# Patient Record
Sex: MALE | Race: WHITE | NOT HISPANIC OR LATINO | Employment: FULL TIME | URBAN - METROPOLITAN AREA
[De-identification: names, ages, dates, MRNs, and addresses within clinical notes are randomized per-mention and may not be internally consistent; named-entity substitution may affect disease eponyms.]

---

## 2018-04-11 ENCOUNTER — TELEPHONE (OUTPATIENT)
Dept: PAIN MEDICINE | Facility: CLINIC | Age: 63
End: 2018-04-11

## 2018-04-11 NOTE — TELEPHONE ENCOUNTER
Pt called the [facility] office to schedule a consult w/Dr Diana Soto  Intake was started  Waiting for records from Dr Emily Means office  I called and s/w Suhail Paiz  She will fax the records to the back fax

## 2018-05-24 ENCOUNTER — TELEPHONE (OUTPATIENT)
Dept: RADIOLOGY | Facility: MEDICAL CENTER | Age: 63
End: 2018-05-24

## 2018-05-24 ENCOUNTER — OFFICE VISIT (OUTPATIENT)
Dept: PAIN MEDICINE | Facility: CLINIC | Age: 63
End: 2018-05-24
Payer: COMMERCIAL

## 2018-05-24 VITALS
BODY MASS INDEX: 32.2 KG/M2 | HEART RATE: 60 BPM | HEIGHT: 75 IN | WEIGHT: 259 LBS | SYSTOLIC BLOOD PRESSURE: 138 MMHG | TEMPERATURE: 98.3 F | DIASTOLIC BLOOD PRESSURE: 88 MMHG

## 2018-05-24 DIAGNOSIS — M54.2 NECK PAIN: Primary | ICD-10-CM

## 2018-05-24 DIAGNOSIS — M47.812 SPONDYLOSIS OF CERVICAL REGION WITHOUT MYELOPATHY OR RADICULOPATHY: ICD-10-CM

## 2018-05-24 PROCEDURE — 99244 OFF/OP CNSLTJ NEW/EST MOD 40: CPT | Performed by: ANESTHESIOLOGY

## 2018-05-24 RX ORDER — PAROXETINE HYDROCHLORIDE 20 MG/1
20 TABLET, FILM COATED ORAL DAILY
Refills: 0 | COMMUNITY
Start: 2018-03-24

## 2018-05-24 RX ORDER — COVID-19 ANTIGEN TEST
KIT MISCELLANEOUS
COMMUNITY
End: 2022-07-07

## 2018-05-24 RX ORDER — LOSARTAN POTASSIUM AND HYDROCHLOROTHIAZIDE 25; 100 MG/1; MG/1
1 TABLET ORAL DAILY
Refills: 0 | COMMUNITY
Start: 2018-03-24

## 2018-05-24 RX ORDER — CARVEDILOL 12.5 MG/1
12.5 TABLET ORAL 2 TIMES DAILY
Refills: 1 | COMMUNITY
Start: 2018-05-12 | End: 2022-07-07

## 2018-05-24 RX ORDER — AMLODIPINE BESYLATE 10 MG/1
10 TABLET ORAL DAILY
Refills: 1 | COMMUNITY
Start: 2018-03-20

## 2018-05-24 RX ORDER — ESOMEPRAZOLE MAGNESIUM 40 MG/1
40 CAPSULE, DELAYED RELEASE ORAL DAILY
Refills: 3 | COMMUNITY
Start: 2018-03-20 | End: 2019-06-12 | Stop reason: SDUPTHER

## 2018-05-24 RX ORDER — PRAVASTATIN SODIUM 40 MG
40 TABLET ORAL DAILY
Refills: 0 | COMMUNITY
Start: 2018-03-24

## 2018-05-24 NOTE — PROGRESS NOTES
Assessment:  1  Neck pain    2  Spondylosis of cervical region without myelopathy or radiculopathy        Plan:  The Patient's neck pain persists despite time, relative rest, activity modification and therapy  Based on the patient's symptoms and examination, I suspect that their pain is being generated by the cervical facet joints  The facet joints are only one of several possible cervical pain generators  Unfortunately, studies have demonstrated that history and examination alone are unreliable  I will schedule the patient for diagnostic cervical medial branch blockade using a double block paradigm  If the patient receives significant pain relief of appropriate duration with bupivacaine 0 25%, we will confirm with bupivacaine  0 75%  If the patient demonstrates appropriate response to medial branch blockade we will schedule for radiofrequency ablation of the blocked nerves to provide long-term pain relief  In the office today, we reviewed the nature of the patient's pathology in depth using  diagrams and models  I discussed the approach we would use for the medial branch block and provided literature for home review  The patient understands the risks associated with the procedure including bleeding, infection, tissue injury, allergic reaction and paralysis and provided written and verbal consent in the office today  My impressions and treatment recommendations were discussed in detail with the patient who verbalized understanding and had no further questions  Discharge instructions were provided  I personally saw and examined the patient and I agree with the above discussed plan of care  Orders Placed This Encounter   Procedures    XR spine cervical complete 4 or 5 vw non injury     Standing Status:   Future     Standing Expiration Date:   5/24/2022     Scheduling Instructions:      Bring along any outside films relating to this procedure             Order Specific Question:   Reason for Exam: Answer:   left neck pain    FL spine and pain procedure     Standing Status:   Future     Standing Expiration Date:   5/24/2022     Order Specific Question:   Reason for Exam:     Answer:   Left C4,5,6,7 MBB #1     Order Specific Question:   Anticoagulant hold needed? Answer:   no     New Medications Ordered This Visit   Medications    PARoxetine (PAXIL) 20 mg tablet     Sig: Take 20 mg by mouth daily     Refill:  0    pravastatin (PRAVACHOL) 40 mg tablet     Sig: Take 40 mg by mouth daily     Refill:  0    amLODIPine (NORVASC) 10 mg tablet     Sig: Take 10 mg by mouth daily     Refill:  1    losartan-hydrochlorothiazide (HYZAAR) 100-25 MG per tablet     Sig: Take 1 tablet by mouth daily     Refill:  0    carvedilol (COREG) 12 5 mg tablet     Sig: Take 12 5 mg by mouth 2 (two) times a day     Refill:  1    esomeprazole (NexIUM) 40 MG capsule     Sig: Take 40 mg by mouth daily     Refill:  3    Naproxen Sodium (ALEVE) 220 MG CAPS     Sig: Take by mouth       History of Present Illness:    Rosetta Dudley is a 60 y o  male with approximately 3 years history of left-sided neck pain  He has undergone physical therapy trigger point injections chiropractic treatment with no relief P read he takes Aleve as needed his pain is moderate to severe rates it a 6/10 on the visual analog scale is nearly constant worse in the morning  Described sharp achy noting that standing, bending, sitting, walking and exercise on increase his symptoms  He denies any radiating pain or weakness of his upper limbs  I have personally reviewed and/or updated the patient's past medical history, past surgical history, family history, social history, current medications, allergies, and vital signs today  Review of Systems:    Review of Systems   Constitutional: Positive for unexpected weight change  Negative for fever  HENT: Negative for trouble swallowing  Eyes: Negative for visual disturbance     Respiratory: Negative for shortness of breath and wheezing  Cardiovascular: Negative for chest pain and palpitations  Gastrointestinal: Negative for constipation, diarrhea, nausea and vomiting  Endocrine: Negative for cold intolerance, heat intolerance and polydipsia  Genitourinary: Negative for difficulty urinating and frequency  Musculoskeletal: Negative for arthralgias, gait problem, joint swelling and myalgias  Skin: Negative for rash  Neurological: Negative for dizziness, seizures, syncope, weakness and headaches  Hematological: Does not bruise/bleed easily  Psychiatric/Behavioral: Negative for dysphoric mood  All other systems reviewed and are negative  There is no problem list on file for this patient  Past Medical History:   Diagnosis Date    Anxiety     Hypercholesterolemia     Hypertension        Past Surgical History:   Procedure Laterality Date    UMBILICAL HERNIA REPAIR         Family History   Problem Relation Age of Onset   [place] Breast cancer Mother     Esophageal cancer Father     Diabetes Family     Breast cancer Family     Esophageal cancer Family     Colon cancer Family        Social History     Occupational History    Not on file  Social History Main Topics    Smoking status: Never Smoker    Smokeless tobacco: Never Used    Alcohol use 2 4 - 3 0 oz/week     4 - 5 Glasses of wine per week    Drug use: No    Sexual activity: Not on file       No current outpatient prescriptions on file prior to visit  No current facility-administered medications on file prior to visit  No Known Allergies    Physical Exam:    /88   Pulse 60   Temp 98 3 °F (36 8 °C)   Ht 6' 3" (1 905 m)   Wt 117 kg (259 lb)   BMI 32 37 kg/m²     Constitutional: normal, well developed, well nourished, alert, in no distress and non-toxic and no overt pain behavior    Eyes: anicteric  HEENT: grossly intact  Neck: supple, symmetric, trachea midline and no masses   Pulmonary:even and unlabored  Cardiovascular:No edema or pitting edema present  Skin:Normal without rashes or lesions and well hydrated  Psychiatric:Mood and affect appropriate  Neurologic:Cranial Nerves II-XII grossly intact  Musculoskeletal:normal   Inspection:  Normal station and gait  Normal cervical curves and head posture  Skin intact without erythema  No sensory loss  There is no atrophy  Palpation:  There is tenderness to palpation overlying the left cervical paraspinals, cervical facet joints  There is no tenderness over the upper trapezius muscles bilateral  No shoulder tenderness  Motor/Strength:  5/5 strength in the bilateral upper extremities  Reflexes:  equal and symmetric in the upper limbs  Sensation:   Sensation intact to light touch and pinprick in the upper limbs  Maneuvers:  Negative Spurling's maneuver  Negative Lhermitte's sign

## 2018-05-24 NOTE — TELEPHONE ENCOUNTER
Patient seen by Dr Mayte Irwin this morning in consult  Left message for patient to call back to schedule:     Left C4, 5, 6, 7 MBB #1    Please transfer call to [facility] procedure scheduling line       [facility]

## 2018-05-30 NOTE — TELEPHONE ENCOUNTER
Returned call to patient and scheduled:     Left C4, 5, 6, 7 MBB #1 on 6/14 ([place] sooner)    Reviewed instructions: , NPO 1 hour prior, loose-fitting/comfortable clothes, if ill/fever/infx/abx to call and reschedule  Also pain level at leat 5/10 and refrain from PRN, as-needed pain meds 6h prior  Patient stated verbal understanding

## 2018-06-05 ENCOUNTER — APPOINTMENT (OUTPATIENT)
Dept: RADIOLOGY | Facility: CLINIC | Age: 63
End: 2018-06-05
Payer: COMMERCIAL

## 2018-06-05 DIAGNOSIS — M54.2 NECK PAIN: ICD-10-CM

## 2018-06-05 PROCEDURE — 72050 X-RAY EXAM NECK SPINE 4/5VWS: CPT

## 2018-06-11 ENCOUNTER — HOSPITAL ENCOUNTER (OUTPATIENT)
Dept: RADIOLOGY | Facility: CLINIC | Age: 63
Discharge: HOME | End: 2018-06-11
Attending: ANESTHESIOLOGY | Admitting: ANESTHESIOLOGY
Payer: COMMERCIAL

## 2018-06-11 VITALS
SYSTOLIC BLOOD PRESSURE: 131 MMHG | HEART RATE: 61 BPM | TEMPERATURE: 97.2 F | DIASTOLIC BLOOD PRESSURE: 79 MMHG | OXYGEN SATURATION: 96 % | RESPIRATION RATE: 20 BRPM

## 2018-06-11 DIAGNOSIS — M54.2 NECK PAIN: ICD-10-CM

## 2018-06-11 DIAGNOSIS — M47.812 SPONDYLOSIS OF CERVICAL REGION WITHOUT MYELOPATHY OR RADICULOPATHY: ICD-10-CM

## 2018-06-11 PROCEDURE — 64633 DESTROY CERV/THOR FACET JNT: CPT | Performed by: ANESTHESIOLOGY

## 2018-06-11 PROCEDURE — 64634 DESTROY C/TH FACET JNT ADDL: CPT | Performed by: ANESTHESIOLOGY

## 2018-06-11 RX ORDER — BUPIVACAINE HCL/PF 2.5 MG/ML
10 VIAL (ML) INJECTION ONCE
Status: COMPLETED | OUTPATIENT
Start: 2018-06-11 | End: 2018-06-11

## 2018-06-11 RX ADMIN — BUPIVACAINE HYDROCHLORIDE 7 ML: 2.5 INJECTION, SOLUTION EPIDURAL; INFILTRATION; INTRACAUDAL at 14:19

## 2018-06-11 NOTE — DISCHARGE INSTRUCTIONS

## 2018-06-11 NOTE — H&P
History of Present Illness: The patient is a 60 y o  male who presents with complaints of neck pain  There is no problem list on file for this patient  Past Medical History:   Diagnosis Date    Anxiety     Hypercholesterolemia     Hypertension        Past Surgical History:   Procedure Laterality Date    UMBILICAL HERNIA REPAIR           Current Outpatient Prescriptions:     amLODIPine (NORVASC) 10 mg tablet, Take 10 mg by mouth daily, Disp: , Rfl: 1    carvedilol (COREG) 12 5 mg tablet, Take 12 5 mg by mouth 2 (two) times a day, Disp: , Rfl: 1    esomeprazole (NexIUM) 40 MG capsule, Take 40 mg by mouth daily, Disp: , Rfl: 3    losartan-hydrochlorothiazide (HYZAAR) 100-25 MG per tablet, Take 1 tablet by mouth daily, Disp: , Rfl: 0    Naproxen Sodium (ALEVE) 220 MG CAPS, Take by mouth, Disp: , Rfl:     PARoxetine (PAXIL) 20 mg tablet, Take 20 mg by mouth daily, Disp: , Rfl: 0    pravastatin (PRAVACHOL) 40 mg tablet, Take 40 mg by mouth daily, Disp: , Rfl: 0    No Known Allergies    Physical Exam:   Vitals:    06/11/18 1405   BP: 163/84   Pulse: 60   Resp: 18   Temp: (!) 97 2 °F (36 2 °C)   SpO2: 94%     General: Awake, Alert, Oriented x 3, Mood and affect appropriate  Respiratory: Respirations even and unlabored  Cardiovascular: Peripheral pulses intact; no edema  Musculoskeletal Exam:  Decreased range of motion cervical spine    ASA Score: II    Assessment:   1  Neck pain    2   Spondylosis of cervical region without myelopathy or radiculopathy        Plan: Left C4,5,6,7 MBB #1

## 2018-07-13 ENCOUNTER — HOSPITAL ENCOUNTER (OUTPATIENT)
Dept: RADIOLOGY | Facility: CLINIC | Age: 63
Discharge: HOME | End: 2018-07-13
Attending: ANESTHESIOLOGY | Admitting: ANESTHESIOLOGY
Payer: COMMERCIAL

## 2018-07-13 VITALS
HEART RATE: 58 BPM | DIASTOLIC BLOOD PRESSURE: 83 MMHG | SYSTOLIC BLOOD PRESSURE: 133 MMHG | RESPIRATION RATE: 20 BRPM | OXYGEN SATURATION: 96 % | TEMPERATURE: 97.5 F

## 2018-07-13 DIAGNOSIS — M47.812 CERVICAL SPONDYLOSIS WITHOUT MYELOPATHY: ICD-10-CM

## 2018-07-13 DIAGNOSIS — M54.2 NECK PAIN: ICD-10-CM

## 2018-07-13 PROCEDURE — 64492 INJ PARAVERT F JNT C/T 3 LEV: CPT | Performed by: ANESTHESIOLOGY

## 2018-07-13 PROCEDURE — 64490 INJ PARAVERT F JNT C/T 1 LEV: CPT | Performed by: ANESTHESIOLOGY

## 2018-07-13 PROCEDURE — 64491 INJ PARAVERT F JNT C/T 2 LEV: CPT | Performed by: ANESTHESIOLOGY

## 2018-07-13 RX ORDER — BUPIVACAINE HYDROCHLORIDE 7.5 MG/ML
10 INJECTION, SOLUTION EPIDURAL; RETROBULBAR ONCE
Status: COMPLETED | OUTPATIENT
Start: 2018-07-13 | End: 2018-07-13

## 2018-07-13 RX ADMIN — BUPIVACAINE HYDROCHLORIDE 7 ML: 7.5 INJECTION, SOLUTION EPIDURAL; RETROBULBAR at 08:35

## 2018-07-13 NOTE — DISCHARGE INSTRUCTIONS

## 2018-07-13 NOTE — H&P
History of Present Illness: The patient is a 58 y o  male who presents with complaints of neck pain  Patient Active Problem List   Diagnosis    Spondylosis of cervical region without myelopathy or radiculopathy    Neck pain       Past Medical History:   Diagnosis Date    Anxiety     Hypercholesterolemia     Hypertension        Past Surgical History:   Procedure Laterality Date    UMBILICAL HERNIA REPAIR           Current Outpatient Prescriptions:     amLODIPine (NORVASC) 10 mg tablet, Take 10 mg by mouth daily, Disp: , Rfl: 1    carvedilol (COREG) 12 5 mg tablet, Take 12 5 mg by mouth 2 (two) times a day, Disp: , Rfl: 1    esomeprazole (NexIUM) 40 MG capsule, Take 40 mg by mouth daily, Disp: , Rfl: 3    losartan-hydrochlorothiazide (HYZAAR) 100-25 MG per tablet, Take 1 tablet by mouth daily, Disp: , Rfl: 0    Naproxen Sodium (ALEVE) 220 MG CAPS, Take by mouth, Disp: , Rfl:     PARoxetine (PAXIL) 20 mg tablet, Take 20 mg by mouth daily, Disp: , Rfl: 0    pravastatin (PRAVACHOL) 40 mg tablet, Take 40 mg by mouth daily, Disp: , Rfl: 0    No Known Allergies    Physical Exam:   Vitals:    07/13/18 0817   BP: 130/82   Pulse: 63   Resp: 20   Temp: 97 5 °F (36 4 °C)   SpO2: 95%     General: Awake, Alert, Oriented x 3, Mood and affect appropriate  Respiratory: Respirations even and unlabored  Cardiovascular: Peripheral pulses intact; no edema  Musculoskeletal Exam:  Pain on cervical extension  ASA Score: III    Patient/Chart Verification  Patient ID Verified: Verbal  ID Band Applied: No  Consents Confirmed: Procedural, To be obtained in the Pre-Procedure area  H&P( within 30 days) Verified: To be obtained in the Pre-Procedure area  Interval H&P(within 24 hr) Complete (required for Outpatients and Surgery Admit only): To be obtained in the Pre-Procedure area  Allergies Reviewed:  Yes  Anticoag/NSAID held?: NA  Currently on antibiotics?: No  Does Patient Have a Prosthetic Device/Implant: No    Assessment: 1  Cervical spondylosis without myelopathy    2   Neck pain        Plan: LT C4-7 MBB #2

## 2018-07-16 ENCOUNTER — TELEPHONE (OUTPATIENT)
Dept: RADIOLOGY | Facility: CLINIC | Age: 63
End: 2018-07-16

## 2018-07-17 NOTE — TELEPHONE ENCOUNTER
Pt rtc and schedule proc for 8/16/2018 arrival time of 11:30 for 11:40proc with a 4 wk f/u post on 9/13/2018, pt aware of need for  for procedure, npo 1 hr prior, wear pants with no belts/zippers,snaps,erin, if become ill/fever/antbx call to r/s, pt verbalized understanding

## 2018-08-16 ENCOUNTER — HOSPITAL ENCOUNTER (OUTPATIENT)
Dept: RADIOLOGY | Facility: CLINIC | Age: 63
Discharge: HOME | End: 2018-08-16
Admitting: ANESTHESIOLOGY
Payer: COMMERCIAL

## 2018-08-16 ENCOUNTER — TELEPHONE (OUTPATIENT)
Dept: PAIN MEDICINE | Facility: CLINIC | Age: 63
End: 2018-08-16

## 2018-08-16 VITALS
SYSTOLIC BLOOD PRESSURE: 148 MMHG | DIASTOLIC BLOOD PRESSURE: 80 MMHG | TEMPERATURE: 97.8 F | OXYGEN SATURATION: 98 % | HEART RATE: 65 BPM | RESPIRATION RATE: 20 BRPM

## 2018-08-16 DIAGNOSIS — M54.2 CERVICAL PAIN: ICD-10-CM

## 2018-08-16 DIAGNOSIS — M47.812 CERVICAL SPONDYLOSIS WITHOUT MYELOPATHY: ICD-10-CM

## 2018-08-16 PROCEDURE — 64634 DESTROY C/TH FACET JNT ADDL: CPT | Performed by: ANESTHESIOLOGY

## 2018-08-16 PROCEDURE — 64633 DESTROY CERV/THOR FACET JNT: CPT | Performed by: ANESTHESIOLOGY

## 2018-08-16 RX ORDER — LIDOCAINE HYDROCHLORIDE 10 MG/ML
5 INJECTION, SOLUTION EPIDURAL; INFILTRATION; INTRACAUDAL; PERINEURAL ONCE
Status: COMPLETED | OUTPATIENT
Start: 2018-08-16 | End: 2018-08-16

## 2018-08-16 RX ADMIN — LIDOCAINE HYDROCHLORIDE 4 ML: 20 INJECTION, SOLUTION EPIDURAL; INFILTRATION; INTRACAUDAL at 11:45

## 2018-08-16 RX ADMIN — LIDOCAINE HYDROCHLORIDE 5 ML: 10 INJECTION, SOLUTION EPIDURAL; INFILTRATION; INTRACAUDAL; PERINEURAL at 11:44

## 2018-08-16 NOTE — DISCHARGE INSTRUCTIONS

## 2018-08-16 NOTE — H&P
History of Present Illness: The patient is a 58 y o  male who presents with complaints of neck pain  Patient Active Problem List   Diagnosis    Spondylosis of cervical region without myelopathy or radiculopathy    Neck pain       Past Medical History:   Diagnosis Date    Anxiety     Hypercholesterolemia     Hypertension        Past Surgical History:   Procedure Laterality Date    UMBILICAL HERNIA REPAIR           Current Outpatient Prescriptions:     amLODIPine (NORVASC) 10 mg tablet, Take 10 mg by mouth daily, Disp: , Rfl: 1    carvedilol (COREG) 12 5 mg tablet, Take 12 5 mg by mouth 2 (two) times a day, Disp: , Rfl: 1    esomeprazole (NexIUM) 40 MG capsule, Take 40 mg by mouth daily, Disp: , Rfl: 3    losartan-hydrochlorothiazide (HYZAAR) 100-25 MG per tablet, Take 1 tablet by mouth daily, Disp: , Rfl: 0    Naproxen Sodium (ALEVE) 220 MG CAPS, Take by mouth, Disp: , Rfl:     PARoxetine (PAXIL) 20 mg tablet, Take 20 mg by mouth daily, Disp: , Rfl: 0    pravastatin (PRAVACHOL) 40 mg tablet, Take 40 mg by mouth daily, Disp: , Rfl: 0    No Known Allergies    Physical Exam:   Vitals:    08/16/18 1131   BP: 131/80   Pulse: 86   Resp: 18   Temp: 97 8 °F (36 6 °C)   SpO2: 93%     General: Awake, Alert, Oriented x 3, Mood and affect appropriate  Respiratory: Respirations even and unlabored  Cardiovascular: Peripheral pulses intact; no edema  Musculoskeletal Exam:  Decreased range of motion lumbar spine    ASA Score: II    Patient/Chart Verification  Patient ID Verified: Verbal  Consents Confirmed: Procedural, To be obtained in the Pre-Procedure area  H&P( within 30 days) Verified: To be obtained in the Pre-Procedure area  Interval H&P(within 24 hr) Complete (required for Outpatients and Surgery Admit only): To be obtained in the Pre-Procedure area  Allergies Reviewed: Yes  Anticoag/NSAID held?: NA  Currently on antibiotics?: No    Assessment:   1  Cervical spondylosis without myelopathy    2   Cervical pain Plan:  LT C4-7 RFA

## 2018-08-16 NOTE — TELEPHONE ENCOUNTER
**To be called on 8/17/18    Pt is s/p L C4-7 RFA on 8/16/18 by SL    Pt has a follow up with DG on 9/14 to arrive at 8:15 am

## 2018-08-17 NOTE — TELEPHONE ENCOUNTER
S/w the patient and he stated he feels a little sore today  The bandaide's are off and the area is C/D/I  Reviewed the postop instructions  He does have a f/u on 9/14  He inquired about exercise  Encouraged no heavy lifting for a couple of weeks and exercises as tolerated  Patient was appreciative of the call

## 2018-09-14 ENCOUNTER — OFFICE VISIT (OUTPATIENT)
Dept: PAIN MEDICINE | Facility: CLINIC | Age: 63
End: 2018-09-14
Payer: COMMERCIAL

## 2018-09-14 VITALS
BODY MASS INDEX: 32.7 KG/M2 | HEIGHT: 75 IN | SYSTOLIC BLOOD PRESSURE: 124 MMHG | DIASTOLIC BLOOD PRESSURE: 80 MMHG | HEART RATE: 60 BPM | WEIGHT: 263 LBS

## 2018-09-14 DIAGNOSIS — M54.2 NECK PAIN: Primary | ICD-10-CM

## 2018-09-14 DIAGNOSIS — M47.812 SPONDYLOSIS OF CERVICAL REGION WITHOUT MYELOPATHY OR RADICULOPATHY: ICD-10-CM

## 2018-09-14 DIAGNOSIS — G89.4 CHRONIC PAIN SYNDROME: ICD-10-CM

## 2018-09-14 PROCEDURE — 99213 OFFICE O/P EST LOW 20 MIN: CPT | Performed by: NURSE PRACTITIONER

## 2018-09-14 NOTE — PROGRESS NOTES
Assessment:  1  Neck pain    2  Spondylosis of cervical region without myelopathy or radiculopathy    3  Chronic pain syndrome        Plan:  While the patient was in the office today, I discussed with the patient at this point time since he is noting greater than 50% improvement in his left-sided cervical pain, it does appear that the left C4 through C7 radiofrequency ablation procedure was successful  I did review with the patient that the relief can last anywhere from 6-8 months or as long as 3-5 years  I explained to the patient that it is possible over the next 2-3 weeks he may see slow and continued steady improvement with regards to the myofascial component, however, at this point we will take a watch and wait approach and see how he does  The patient was agreeable and verbalized an understanding  I advised the patient that he should slowly and steadily increase his activity, as tolerated, allowing pain be his guide  The patient was agreeable and verbalized an understanding  I discussed with the patient that at this point time he can followup with our office on an as-needed basis  I did review the patient that if his pain symptoms should change, worsen, and/or if he would experience any new symptoms he would like to be evaluated for, he should give our office a call  The patient was agreeable and verbalized an understanding  History of Present Illness: The patient is a 58 y o  male last seen on 8/16/18 who presents for a follow up office visit in regards to chronic pain secondary to cervical spondylosis  The patient currently reports that at this point time he is noting at least 80% improvement in his left-sided neck pain as a result of the left C4 through C7 radiofrequency ablation procedure on August 16, 2018 with Dr Shagufta Gloria    The patient states that he has noticed improvement in his range of motion and even feels that the muscles are not as tight as they were and continues to see slow and steady improvement  I have personally reviewed and/or updated the patient's past medical history, past surgical history, family history, social history, current medications, allergies, and vital signs today  Review of Systems:    Review of Systems   Respiratory: Negative for shortness of breath  Cardiovascular: Negative for chest pain  Gastrointestinal: Negative for constipation, diarrhea, nausea and vomiting  Musculoskeletal: Negative for arthralgias, gait problem, joint swelling and myalgias  Skin: Negative for rash  Neurological: Negative for dizziness, seizures and weakness  All other systems reviewed and are negative  Past Medical History:   Diagnosis Date    Anxiety     Hypercholesterolemia     Hypertension        Past Surgical History:   Procedure Laterality Date    UMBILICAL HERNIA REPAIR         Family History   Problem Relation Age of Onset   Jesus Patel Breast cancer Mother     Esophageal cancer Father     Diabetes Family     Breast cancer Family     Esophageal cancer Family     Colon cancer Family        Social History     Occupational History    Not on file       Social History Main Topics    Smoking status: Never Smoker    Smokeless tobacco: Never Used    Alcohol use 2 4 - 3 0 oz/week     4 - 5 Glasses of wine per week    Drug use: No    Sexual activity: Not on file         Current Outpatient Prescriptions:     amLODIPine (NORVASC) 10 mg tablet, Take 10 mg by mouth daily, Disp: , Rfl: 1    carvedilol (COREG) 12 5 mg tablet, Take 12 5 mg by mouth 2 (two) times a day, Disp: , Rfl: 1    esomeprazole (NexIUM) 40 MG capsule, Take 40 mg by mouth daily, Disp: , Rfl: 3    losartan-hydrochlorothiazide (HYZAAR) 100-25 MG per tablet, Take 1 tablet by mouth daily, Disp: , Rfl: 0    Naproxen Sodium (ALEVE) 220 MG CAPS, Take by mouth, Disp: , Rfl:     PARoxetine (PAXIL) 20 mg tablet, Take 20 mg by mouth daily, Disp: , Rfl: 0    pravastatin (PRAVACHOL) 40 mg tablet, Take 40 mg by mouth daily, Disp: , Rfl: 0    No Known Allergies    Physical Exam:    There were no vitals taken for this visit  Constitutional:normal, well developed, well nourished, alert, in no distress and non-toxic and no overt pain behavior  Eyes:anicteric  HEENT:grossly intact  Neck:supple, symmetric, trachea midline and no masses   Pulmonary:even and unlabored  Cardiovascular:No edema or pitting edema present  Skin:Normal without rashes or lesions and well hydrated  Psychiatric:Mood and affect appropriate  Neurologic:Cranial Nerves II-XII grossly intact  Musculoskeletal:normal      Imaging  No orders to display         No orders of the defined types were placed in this encounter

## 2019-04-05 ENCOUNTER — OFFICE VISIT (OUTPATIENT)
Dept: GASTROENTEROLOGY | Facility: CLINIC | Age: 64
End: 2019-04-05
Payer: COMMERCIAL

## 2019-04-05 ENCOUNTER — TELEPHONE (OUTPATIENT)
Dept: GASTROENTEROLOGY | Facility: CLINIC | Age: 64
End: 2019-04-05

## 2019-04-05 VITALS
SYSTOLIC BLOOD PRESSURE: 132 MMHG | WEIGHT: 253 LBS | BODY MASS INDEX: 31.46 KG/M2 | DIASTOLIC BLOOD PRESSURE: 82 MMHG | HEART RATE: 66 BPM | HEIGHT: 75 IN

## 2019-04-05 DIAGNOSIS — K21.9 GASTROESOPHAGEAL REFLUX DISEASE WITHOUT ESOPHAGITIS: Primary | ICD-10-CM

## 2019-04-05 DIAGNOSIS — Z80.0 FAMILY HISTORY OF COLON CANCER: ICD-10-CM

## 2019-04-05 PROCEDURE — 99213 OFFICE O/P EST LOW 20 MIN: CPT | Performed by: INTERNAL MEDICINE

## 2019-06-12 DIAGNOSIS — K21.9 GASTROESOPHAGEAL REFLUX DISEASE WITHOUT ESOPHAGITIS: Primary | ICD-10-CM

## 2019-06-12 RX ORDER — ESOMEPRAZOLE MAGNESIUM 40 MG/1
CAPSULE, DELAYED RELEASE ORAL
Qty: 90 CAPSULE | Refills: 3 | Status: SHIPPED | OUTPATIENT
Start: 2019-06-12 | End: 2020-06-10

## 2019-06-26 ENCOUNTER — TELEPHONE (OUTPATIENT)
Dept: GASTROENTEROLOGY | Facility: CLINIC | Age: 64
End: 2019-06-26

## 2019-07-01 DIAGNOSIS — Z80.0 FAMILY HISTORY OF COLON CANCER: ICD-10-CM

## 2019-07-01 DIAGNOSIS — Z86.010 HISTORY OF COLON POLYPS: Primary | ICD-10-CM

## 2019-07-15 ENCOUNTER — TELEPHONE (OUTPATIENT)
Dept: GASTROENTEROLOGY | Facility: CLINIC | Age: 64
End: 2019-07-15

## 2019-07-15 NOTE — TELEPHONE ENCOUNTER
Spoke to pt  Went over suprep instructions again and clarified what meds he can take that morning of the procedure

## 2020-04-23 LAB — HBA1C MFR BLD HPLC: 5.2 %

## 2020-06-07 DIAGNOSIS — K21.9 GASTROESOPHAGEAL REFLUX DISEASE WITHOUT ESOPHAGITIS: ICD-10-CM

## 2020-06-10 RX ORDER — ESOMEPRAZOLE MAGNESIUM 40 MG/1
CAPSULE, DELAYED RELEASE ORAL
Qty: 90 CAPSULE | Refills: 3 | Status: SHIPPED | OUTPATIENT
Start: 2020-06-10 | End: 2021-06-01

## 2020-09-21 ENCOUNTER — OFFICE VISIT (OUTPATIENT)
Dept: GASTROENTEROLOGY | Facility: CLINIC | Age: 65
End: 2020-09-21
Payer: COMMERCIAL

## 2020-09-21 VITALS
TEMPERATURE: 97.5 F | WEIGHT: 270 LBS | SYSTOLIC BLOOD PRESSURE: 122 MMHG | DIASTOLIC BLOOD PRESSURE: 74 MMHG | HEART RATE: 53 BPM | BODY MASS INDEX: 33.57 KG/M2 | HEIGHT: 75 IN

## 2020-09-21 DIAGNOSIS — Z71.89 ENCOUNTER FOR ANTICOAGULATION DISCUSSION AND COUNSELING: ICD-10-CM

## 2020-09-21 DIAGNOSIS — Z86.010 HISTORY OF COLON POLYPS: ICD-10-CM

## 2020-09-21 DIAGNOSIS — R13.19 ESOPHAGEAL DYSPHAGIA: ICD-10-CM

## 2020-09-21 DIAGNOSIS — Z80.0 FAMILY HISTORY OF COLON CANCER: ICD-10-CM

## 2020-09-21 DIAGNOSIS — K21.9 GASTROESOPHAGEAL REFLUX DISEASE WITHOUT ESOPHAGITIS: Primary | ICD-10-CM

## 2020-09-21 PROBLEM — Z86.0100 HISTORY OF COLON POLYPS: Status: ACTIVE | Noted: 2020-09-21

## 2020-09-21 PROCEDURE — 99214 OFFICE O/P EST MOD 30 MIN: CPT | Performed by: INTERNAL MEDICINE

## 2020-09-21 RX ORDER — METOPROLOL TARTRATE 50 MG/1
50 TABLET, FILM COATED ORAL EVERY 12 HOURS SCHEDULED
COMMUNITY

## 2020-09-21 NOTE — PROGRESS NOTES
[facility] - Outpatient Follow-up Note  Farideh Augustin  76 y o  male MRN: [id number]  Encounter: [id number]    ASSESSMENT AND PLAN:      1  Gastroesophageal reflux disease without esophagitis  Chronic reflux is generally been stable by watching diet and daily use of esomeprazole  Now complains of slight discomfort with foods like potatoes which seem to be going down slowly, but symptoms not as bad as when he was having solid food dysphagia years ago  Aside from the slight dysphagia no other alarm symptoms of weight loss or bleeding  Differential diagnosis includes peptic esophagitis versus esophageal stricture/Schatzki ring  No Newton's previously but should reassess given his family history  Discussed reassessment by upper endoscopy as it has been over 12 years since his last exam and in addition to his symptoms he also has a family history of esophageal cancer  · Continue with reflux diet and precautions  · Continue esomeprazole  · Schedule EGD, will need to hold Xarelto for 2 days prior to procedure    2  Esophageal dysphagia  Mild dysphagia to specific foods such as potatoes  Plan as outlined above  3  Encounter for anticoagulation discussion and counseling  On anticoagulation for atrial fibrillation over 6 months  No cardiac symptoms denying chest pain palpitations or dyspnea  Discussed bleeding risk of endoscopic evaluation on anticoagulation verses risk of stroke and thromboembolic disease holding medication  Will contact cardiology to verify the no additional recommendations other than holding medication for 2 days are needed  4  History of colon polyps  Serrated adenoma on examination last July  Surveillance every 5 years recommended  · Next colonoscopy due July 2024      5  Family history of colon cancer  Up-to-date with surveillance        Followup Appointment:  1 year pending endoscopy result  ______________________________________________________________________    Chief Complaint   Patient presents with    Annual follow up     GERD     HPI:  GERD stable  No heartburn or odynophagia, but feels like some foods like potatoes go down slowly  Discussed GERD and dysphagia  Stools normal   No bleeding or melena  Discussed previous EGD and colonoscopy results  Diagnosed with atrial fibrillation last March  No chest pain lightheadedness or palpitations but is on anticoagulation  Discussed risks of up her endoscopy on Xarelto and need to hold medications for evaluation and biopsy        Historical Information   Past Medical History:   Diagnosis Date    Anxiety     Atrial fibrillation ([address] )     Colon polyp     GERD (gastroesophageal reflux disease)     Hypercholesterolemia     Hypertension      Past Surgical History:   Procedure Laterality Date    COLONOSCOPY      July 2019:  Sessile serrated adenoma  June 2014, hyperplastic polyps and internal hemorrhoids    ESOPHAGOGASTRODUODENOSCOPY      Irregular Z-line and gastritis biopsies negative for H pylori or Newton's    HYDROCELE EXCISION / REPAIR      UMBILICAL HERNIA REPAIR      UPPER GASTROINTESTINAL ENDOSCOPY      2008:  Negative for mass ulcer or Newton's     Social History     Substance and Sexual Activity   Alcohol Use Yes    Alcohol/week: 4 0 - 5 0 standard drinks    Types: 4 - 5 Glasses of wine per week    Frequency: Monthly or less     Social History     Substance and Sexual Activity   Drug Use No     Social History     Tobacco Use   Smoking Status Never Smoker   Smokeless Tobacco Never Used     Family History   Problem Relation Age of Onset    Breast cancer Mother     Esophageal cancer Father     Cancer Father     Diabetes Family     Breast cancer Family     Esophageal cancer Family     Colon cancer Family     Colon cancer Brother          Current Outpatient Medications:     amLODIPine (NORVASC) 10 mg tablet   esomeprazole (NexIUM) 40 MG capsule    losartan-hydrochlorothiazide (HYZAAR) 100-25 MG per tablet    metoprolol tartrate (LOPRESSOR) 50 mg tablet    PARoxetine (PAXIL) 20 mg tablet    pravastatin (PRAVACHOL) 40 mg tablet    rivaroxaban (Xarelto) 20 mg tablet    carvedilol (COREG) 12 5 mg tablet    Naproxen Sodium (ALEVE) 220 MG CAPS  No Known Allergies  Reviewed medications and allergies and updated as indicated    PHYSICAL EXAM:    Blood pressure 122/74, pulse (!) 53, temperature 97 5 °F (36 4 °C), height 6' 3" (1 905 m), weight 122 kg (270 lb)  Body mass index is 33 75 kg/m²  General Appearance: NAD, cooperative, alert  Eyes: Anicteric, PERRLA, EOMI  ENT:  Normocephalic, atraumatic, normal mucosa  Neck:  Supple, symmetrical, trachea midline  Resp:  Clear to auscultation bilaterally; no rales, rhonchi or wheezing; respirations unlabored   CV:  S1 S2, Regular rate and rhythm; no murmur, rub, or gallop  GI:  Soft, non-tender, non-distended; normal bowel sounds; no masses, no organomegaly   Rectal: Deferred  Musculoskeletal: No cyanosis, clubbing or edema  Normal ROM  Skin:  No jaundice, rashes, or lesions   Heme/Lymph: No palpable cervical lymphadenopathy  Psych: Normal affect, good eye contact  Neuro: No gross deficits, AAOx3    Lab Results:   No results found for: WBC, HGB, HCT, MCV, PLT  No results found for: NA, K, CL, CO2, ANIONGAP, BUN, CREATININE, GLUCOSE, GLUF, CALCIUM, CORRECTEDCA, AST, ALT, ALKPHOS, PROT, BILITOT, EGFR  No results found for: IRON, TIBC, FERRITIN  No results found for: LIPASE    Radiology Results:   No results found

## 2020-09-21 NOTE — PATIENT INSTRUCTIONS
Continue with reflux diet and precautions  Cut and chew food well, take time eating  Continue with Nexium  Schedule EGD  Will await confirmation from Cardiology regarding holding Xarelto for 2 days prior to EGD

## 2020-09-21 NOTE — LETTER
September 21, 2020     Claudia Ham MD  [facility] [address]    Patient: Darwin Fitzgerald  YOB: 1955   Date of Visit: 9/21/2020       Dear Dr Sana Sims: Thank you for referring Víctor Bedolla to me for evaluation  Below are my notes for this consultation  If you have questions, please do not hesitate to call me  I look forward to following your patient along with you  Sincerely,        Pau Mejia MD        CC: Marylee Noe, MD  9/21/2020  2:55 PM  Sign when Signing Visit  [facility] - Outpatient Follow-up Note  Darwin Fitzgerald  76 y o  male MRN: [id number]  Encounter: [id number]    ASSESSMENT AND PLAN:      1  Gastroesophageal reflux disease without esophagitis  Chronic reflux is generally been stable by watching diet and daily use of esomeprazole  Now complains of slight discomfort with foods like potatoes which seem to be going down slowly, but symptoms not as bad as when he was having solid food dysphagia years ago  Aside from the slight dysphagia no other alarm symptoms of weight loss or bleeding  Differential diagnosis includes peptic esophagitis versus esophageal stricture/Schatzki ring  No Newton's previously but should reassess given his family history  Discussed reassessment by upper endoscopy as it has been over 12 years since his last exam and in addition to his symptoms he also has a family history of esophageal cancer  · Continue with reflux diet and precautions  · Continue esomeprazole  · Schedule EGD, will need to hold Xarelto for 2 days prior to procedure    2  Esophageal dysphagia  Mild dysphagia to specific foods such as potatoes  Plan as outlined above  3  Encounter for anticoagulation discussion and counseling  On anticoagulation for atrial fibrillation over 6 months  No cardiac symptoms denying chest pain palpitations or dyspnea    Discussed bleeding risk of endoscopic evaluation on anticoagulation verses risk of stroke and thromboembolic disease holding medication  Will contact cardiology to verify the no additional recommendations other than holding medication for 2 days are needed  4  History of colon polyps  Serrated adenoma on examination last July  Surveillance every 5 years recommended  · Next colonoscopy due July 2024      5  Family history of colon cancer  Up-to-date with surveillance  Followup Appointment:  1 year pending endoscopy result  ______________________________________________________________________    Chief Complaint   Patient presents with    Annual follow up     GERD     HPI:  GERD stable  No heartburn or odynophagia, but feels like some foods like potatoes go down slowly  Discussed GERD and dysphagia  Stools normal   No bleeding or melena  Discussed previous EGD and colonoscopy results  Diagnosed with atrial fibrillation last March  No chest pain lightheadedness or palpitations but is on anticoagulation  Discussed risks of up her endoscopy on Xarelto and need to hold medications for evaluation and biopsy        Historical Information   Past Medical History:   Diagnosis Date    Anxiety     Atrial fibrillation ([address] )     Colon polyp     GERD (gastroesophageal reflux disease)     Hypercholesterolemia     Hypertension      Past Surgical History:   Procedure Laterality Date    COLONOSCOPY      July 2019:  Sessile serrated adenoma  June 2014, hyperplastic polyps and internal hemorrhoids    ESOPHAGOGASTRODUODENOSCOPY      Irregular Z-line and gastritis biopsies negative for H pylori or Newton's    HYDROCELE EXCISION / REPAIR      UMBILICAL HERNIA REPAIR      UPPER GASTROINTESTINAL ENDOSCOPY      2008:  Negative for mass ulcer or Newton's     Social History     Substance and Sexual Activity   Alcohol Use Yes    Alcohol/week: 4 0 - 5 0 standard drinks    Types: 4 - 5 Glasses of wine per week    Frequency: Monthly or less     Social History     Substance and Sexual Activity   Drug Use No     Social History     Tobacco Use   Smoking Status Never Smoker   Smokeless Tobacco Never Used     Family History   Problem Relation Age of Onset    Breast cancer Mother     Esophageal cancer Father     Cancer Father     Diabetes Family     Breast cancer Family     Esophageal cancer Family     Colon cancer Family     Colon cancer Brother          Current Outpatient Medications:     amLODIPine (NORVASC) 10 mg tablet    esomeprazole (NexIUM) 40 MG capsule    losartan-hydrochlorothiazide (HYZAAR) 100-25 MG per tablet    metoprolol tartrate (LOPRESSOR) 50 mg tablet    PARoxetine (PAXIL) 20 mg tablet    pravastatin (PRAVACHOL) 40 mg tablet    rivaroxaban (Xarelto) 20 mg tablet    carvedilol (COREG) 12 5 mg tablet    Naproxen Sodium (ALEVE) 220 MG CAPS  No Known Allergies  Reviewed medications and allergies and updated as indicated    PHYSICAL EXAM:    Blood pressure 122/74, pulse (!) 53, temperature 97 5 °F (36 4 °C), height 6' 3" (1 905 m), weight 122 kg (270 lb)  Body mass index is 33 75 kg/m²  General Appearance: NAD, cooperative, alert  Eyes: Anicteric, PERRLA, EOMI  ENT:  Normocephalic, atraumatic, normal mucosa  Neck:  Supple, symmetrical, trachea midline  Resp:  Clear to auscultation bilaterally; no rales, rhonchi or wheezing; respirations unlabored   CV:  S1 S2, Regular rate and rhythm; no murmur, rub, or gallop  GI:  Soft, non-tender, non-distended; normal bowel sounds; no masses, no organomegaly   Rectal: Deferred  Musculoskeletal: No cyanosis, clubbing or edema  Normal ROM    Skin:  No jaundice, rashes, or lesions   Heme/Lymph: No palpable cervical lymphadenopathy  Psych: Normal affect, good eye contact  Neuro: No gross deficits, AAOx3    Lab Results:   No results found for: WBC, HGB, HCT, MCV, PLT  No results found for: NA, K, CL, CO2, ANIONGAP, BUN, CREATININE, GLUCOSE, GLUF, CALCIUM, CORRECTEDCA, AST, ALT, ALKPHOS, PROT, BILITOT, EGFR  No results found for: IRON, TIBC, FERRITIN  No results found for: LIPASE    Radiology Results:   No results found

## 2020-10-19 ENCOUNTER — TELEMEDICINE (OUTPATIENT)
Dept: GASTROENTEROLOGY | Facility: CLINIC | Age: 65
End: 2020-10-19

## 2020-10-19 VITALS — WEIGHT: 270 LBS | BODY MASS INDEX: 33.57 KG/M2 | HEIGHT: 75 IN

## 2020-10-19 DIAGNOSIS — K21.9 GASTROESOPHAGEAL REFLUX DISEASE WITHOUT ESOPHAGITIS: Primary | ICD-10-CM

## 2020-10-26 ENCOUNTER — HOSPITAL ENCOUNTER (OUTPATIENT)
Dept: GASTROENTEROLOGY | Facility: AMBULATORY SURGERY CENTER | Age: 65
Discharge: HOME | End: 2020-10-26
Payer: COMMERCIAL

## 2020-10-26 ENCOUNTER — ANESTHESIA EVENT (OUTPATIENT)
Dept: GASTROENTEROLOGY | Facility: AMBULATORY SURGERY CENTER | Age: 65
End: 2020-10-26

## 2020-10-26 ENCOUNTER — ANESTHESIA (OUTPATIENT)
Dept: GASTROENTEROLOGY | Facility: AMBULATORY SURGERY CENTER | Age: 65
End: 2020-10-26

## 2020-10-26 VITALS
DIASTOLIC BLOOD PRESSURE: 72 MMHG | TEMPERATURE: 97.5 F | HEART RATE: 76 BPM | RESPIRATION RATE: 22 BRPM | SYSTOLIC BLOOD PRESSURE: 126 MMHG | OXYGEN SATURATION: 98 %

## 2020-10-26 VITALS — HEART RATE: 65 BPM

## 2020-10-26 DIAGNOSIS — K21.9 GASTROESOPHAGEAL REFLUX DISEASE WITHOUT ESOPHAGITIS: ICD-10-CM

## 2020-10-26 DIAGNOSIS — R13.19 ESOPHAGEAL DYSPHAGIA: ICD-10-CM

## 2020-10-26 PROBLEM — E78.5 HYPERLIPIDEMIA: Status: ACTIVE | Noted: 2020-10-26

## 2020-10-26 PROBLEM — E66.811 OBESITY (BMI 30.0-34.9): Status: ACTIVE | Noted: 2020-10-26

## 2020-10-26 PROBLEM — E66.9 OBESITY (BMI 30.0-34.9): Status: ACTIVE | Noted: 2020-10-26

## 2020-10-26 PROBLEM — I48.91 A-FIB (HCC): Status: ACTIVE | Noted: 2020-10-26

## 2020-10-26 PROBLEM — I10 HTN (HYPERTENSION): Status: ACTIVE | Noted: 2020-10-26

## 2020-10-26 PROBLEM — F41.9 ANXIETY: Status: ACTIVE | Noted: 2020-10-26

## 2020-10-26 PROCEDURE — 43239 EGD BIOPSY SINGLE/MULTIPLE: CPT | Performed by: INTERNAL MEDICINE

## 2020-10-26 PROCEDURE — 43450 DILATE ESOPHAGUS 1/MULT PASS: CPT | Performed by: INTERNAL MEDICINE

## 2020-10-26 PROCEDURE — 88305 TISSUE EXAM BY PATHOLOGIST: CPT | Performed by: PATHOLOGY

## 2020-10-26 RX ORDER — PROPOFOL 10 MG/ML
INJECTION, EMULSION INTRAVENOUS AS NEEDED
Status: DISCONTINUED | OUTPATIENT
Start: 2020-10-26 | End: 2020-10-26

## 2020-10-26 RX ORDER — METOCLOPRAMIDE HYDROCHLORIDE 5 MG/ML
10 INJECTION INTRAMUSCULAR; INTRAVENOUS ONCE AS NEEDED
Status: CANCELLED | OUTPATIENT
Start: 2020-10-26

## 2020-10-26 RX ORDER — HYDRALAZINE HYDROCHLORIDE 20 MG/ML
5 INJECTION INTRAMUSCULAR; INTRAVENOUS
Status: CANCELLED | OUTPATIENT
Start: 2020-10-26

## 2020-10-26 RX ORDER — SODIUM CHLORIDE 9 MG/ML
50 INJECTION, SOLUTION INTRAVENOUS CONTINUOUS
Status: DISCONTINUED | OUTPATIENT
Start: 2020-10-26 | End: 2020-10-30 | Stop reason: HOSPADM

## 2020-10-26 RX ORDER — FENTANYL CITRATE/PF 50 MCG/ML
12.5 SYRINGE (ML) INJECTION
Status: CANCELLED | OUTPATIENT
Start: 2020-10-26

## 2020-10-26 RX ORDER — ONDANSETRON 2 MG/ML
4 INJECTION INTRAMUSCULAR; INTRAVENOUS ONCE AS NEEDED
Status: CANCELLED | OUTPATIENT
Start: 2020-10-26

## 2020-10-26 RX ORDER — MEPERIDINE HYDROCHLORIDE 50 MG/ML
12.5 INJECTION INTRAMUSCULAR; INTRAVENOUS; SUBCUTANEOUS
Status: CANCELLED | OUTPATIENT
Start: 2020-10-26

## 2020-10-26 RX ORDER — LABETALOL 20 MG/4 ML (5 MG/ML) INTRAVENOUS SYRINGE
5
Status: CANCELLED | OUTPATIENT
Start: 2020-10-26

## 2020-10-26 RX ADMIN — PROPOFOL 50 MG: 10 INJECTION, EMULSION INTRAVENOUS at 08:57

## 2020-10-26 RX ADMIN — PROPOFOL 30 MG: 10 INJECTION, EMULSION INTRAVENOUS at 08:54

## 2020-10-26 RX ADMIN — PROPOFOL 50 MG: 10 INJECTION, EMULSION INTRAVENOUS at 08:50

## 2020-10-26 RX ADMIN — PROPOFOL 50 MG: 10 INJECTION, EMULSION INTRAVENOUS at 08:52

## 2020-10-26 RX ADMIN — SODIUM CHLORIDE 50 ML/HR: 9 INJECTION, SOLUTION INTRAVENOUS at 08:27

## 2020-10-26 RX ADMIN — PROPOFOL 20 MG: 10 INJECTION, EMULSION INTRAVENOUS at 08:55

## 2020-10-26 RX ADMIN — PROPOFOL 100 MG: 10 INJECTION, EMULSION INTRAVENOUS at 08:48

## 2020-10-26 RX ADMIN — PROPOFOL 50 MG: 10 INJECTION, EMULSION INTRAVENOUS at 08:49

## 2021-02-18 DIAGNOSIS — Z23 ENCOUNTER FOR IMMUNIZATION: ICD-10-CM

## 2021-02-19 ENCOUNTER — IMMUNIZATIONS (OUTPATIENT)
Dept: FAMILY MEDICINE CLINIC | Facility: HOSPITAL | Age: 66
End: 2021-02-19

## 2021-02-19 DIAGNOSIS — Z23 ENCOUNTER FOR IMMUNIZATION: Primary | ICD-10-CM

## 2021-02-19 PROCEDURE — 91300 SARS-COV-2 / COVID-19 MRNA VACCINE (PFIZER-BIONTECH) 30 MCG: CPT

## 2021-02-19 PROCEDURE — 0001A SARS-COV-2 / COVID-19 MRNA VACCINE (PFIZER-BIONTECH) 30 MCG: CPT

## 2021-03-11 ENCOUNTER — IMMUNIZATIONS (OUTPATIENT)
Dept: FAMILY MEDICINE CLINIC | Facility: HOSPITAL | Age: 66
End: 2021-03-11

## 2021-03-11 DIAGNOSIS — Z23 ENCOUNTER FOR IMMUNIZATION: Primary | ICD-10-CM

## 2021-03-11 PROCEDURE — 0002A SARS-COV-2 / COVID-19 MRNA VACCINE (PFIZER-BIONTECH) 30 MCG: CPT

## 2021-03-11 PROCEDURE — 91300 SARS-COV-2 / COVID-19 MRNA VACCINE (PFIZER-BIONTECH) 30 MCG: CPT

## 2021-05-30 DIAGNOSIS — K21.9 GASTROESOPHAGEAL REFLUX DISEASE WITHOUT ESOPHAGITIS: ICD-10-CM

## 2021-06-01 RX ORDER — ESOMEPRAZOLE MAGNESIUM 40 MG/1
CAPSULE, DELAYED RELEASE ORAL
Qty: 90 CAPSULE | Refills: 3 | Status: SHIPPED | OUTPATIENT
Start: 2021-06-01 | End: 2022-05-05

## 2021-08-11 ENCOUNTER — TELEPHONE (OUTPATIENT)
Dept: PAIN MEDICINE | Facility: CLINIC | Age: 66
End: 2021-08-11

## 2021-08-11 NOTE — TELEPHONE ENCOUNTER
Regarding: RE: Visit Follow-Up Question  ----- Message from Marco Merino RN sent at 2021  9:01 AM EDT -----       ----- Message sent from Marco Merino RN to Balaji Mccain  at 2021  9:00 AM -----   Tommie Vasquez morning, Travon Dobbs  I'm sorry to hear that your pain has returned  Unfortunately, it has been almost exactly 3 years since we have seen you  You should really come in to the office to be evaluated and review any changes / updates in your health  I will ask one of the  attendants to reach out to you to schedule an appointment as soon as possible  Kaushal Triana RN            ----- Message -----       From:Umer Samaniego  Sent:8/10/2021  4:43 PM EDT         To:AKSHAT Mcduffie    Subject:Visit Follow-Up Question    I need to follow up with YUSRA Crowder Ala  regarding an issue that has return in my spine  He did a procedure in 2018, which was done under fluoroscopy, that severed the nerve endings in my spine to the muscles that were freezing up and causing a lot of pain and discomfort  This issue has return since the nerve endings grew eventually grew back  My  is 1955 and my name is Jordyn Headley   Mellissa

## 2021-09-01 ENCOUNTER — CONSULT (OUTPATIENT)
Dept: PAIN MEDICINE | Facility: CLINIC | Age: 66
End: 2021-09-01
Payer: COMMERCIAL

## 2021-09-01 ENCOUNTER — TELEPHONE (OUTPATIENT)
Dept: RADIOLOGY | Facility: CLINIC | Age: 66
End: 2021-09-01

## 2021-09-01 DIAGNOSIS — M47.812 CERVICAL SPONDYLOSIS: Primary | ICD-10-CM

## 2021-09-01 DIAGNOSIS — M54.2 NECK PAIN: ICD-10-CM

## 2021-09-01 PROBLEM — K56.609 SMALL BOWEL OBSTRUCTION (HCC): Status: ACTIVE | Noted: 2021-09-01

## 2021-09-01 PROBLEM — K55.059 ACUTE VASCULAR INSUFFICIENCY OF INTESTINE (HCC): Status: ACTIVE | Noted: 2021-09-01

## 2021-09-01 PROBLEM — J96.00 ACUTE RESPIRATORY FAILURE (HCC): Status: ACTIVE | Noted: 2021-09-01

## 2021-09-01 PROBLEM — E87.1 HYPONATREMIA: Status: ACTIVE | Noted: 2021-09-01

## 2021-09-01 PROBLEM — G47.33 OSA (OBSTRUCTIVE SLEEP APNEA): Status: ACTIVE | Noted: 2021-08-02

## 2021-09-01 PROBLEM — M19.90 ARTHRITIS: Status: ACTIVE | Noted: 2021-09-01

## 2021-09-01 PROBLEM — R09.02 HYPOXEMIA: Status: ACTIVE | Noted: 2021-09-01

## 2021-09-01 PROBLEM — T17.800A ASPIRATION INTO LOWER RESPIRATORY TRACT: Status: ACTIVE | Noted: 2021-09-01

## 2021-09-01 PROBLEM — A41.9 SEPSIS (HCC): Status: ACTIVE | Noted: 2021-09-01

## 2021-09-01 PROBLEM — K46.9 INTRA-ABDOMINAL HERNIA: Status: ACTIVE | Noted: 2021-09-01

## 2021-09-01 PROBLEM — M19.90 IDIOPATHIC OSTEOARTHRITIS: Status: ACTIVE | Noted: 2021-09-01

## 2021-09-01 PROBLEM — K42.9 UMBILICAL HERNIA: Status: ACTIVE | Noted: 2021-09-01

## 2021-09-01 PROBLEM — G47.00 INSOMNIA: Status: ACTIVE | Noted: 2021-08-02

## 2021-09-01 PROCEDURE — 99204 OFFICE O/P NEW MOD 45 MIN: CPT | Performed by: ANESTHESIOLOGY

## 2021-09-01 PROCEDURE — 1036F TOBACCO NON-USER: CPT | Performed by: ANESTHESIOLOGY

## 2021-09-01 NOTE — TELEPHONE ENCOUNTER
----- Message from Cele Savage DO sent at 9/1/2021 12:44 PM EDT -----  Please call patient to schedule medial branch block, order in chart

## 2021-09-01 NOTE — PATIENT INSTRUCTIONS
Neck Pain   AMBULATORY CARE:   Neck pain  may be sudden and increase quickly  You may instead feel pain build slowly over time  Neck pain may go away in a few days or weeks, or it may continue for months  The pain may come and go, or be worse with certain movements  The pain may be only in your neck, or it may move to your arms, back, or shoulders  You may also have pain that starts in another body area and moves to your neck  Some types of neck pain are permanent  Seek care immediately if:   · You have an injury that causes neck pain and shooting pain down your arms or legs  · Your neck pain suddenly becomes severe  · You have neck pain along with numbness, tingling, or weakness in your arms or legs  · You have a stiff neck, a headache, and a fever  Contact your healthcare provider if:   · You have new or worsening symptoms  · Your symptoms continue even after treatment  · You have questions or concerns about your condition or care  Treatment  may include any of the following, depending on what is causing your pain:  · Medicines  may be prescribed or recommended by your healthcare provider for pain  You may need medicine to treat nerve pain or to stop muscle spasms  Medicines may also be given to reduce inflammation  Your healthcare provider may inject medicine into a nerve to block pain  Over-the-counter NSAID medicine or acetaminophen may be recommended to help treat minor pain or inflammation  · Traction  is used to relieve pressure from nerves  Your head is gently pulled up and away from your neck  This stretches muscles and ligaments and makes more room for the spine  Your healthcare provider will tell you the kind of traction that will help your neck pain  Do not use traction devices at home unless directed by your healthcare provider  · Surgery  may be needed if the pain is severe or other treatments do not work  Surgery will not help every kind of neck pain   You may need surgery to stabilize a fractured bone or to remove a tumor  Surgery may also be used to widen a narrowed spinal column or to remove a disc from between neck bones  Manage or prevent neck pain:   · Rest your neck as directed  Do not make sudden movements, such as turning your head quickly  Your healthcare provider may recommend you wear a cervical collar for a short time  The collar will prevent you from moving your head  This will give your neck time to heal if an injury is causing your neck pain  Ask your healthcare provider when you can return to sports or other normal daily activities  · Apply heat as directed  Heat helps relieve pain and swelling  Use a heat wrap, or soak a small towel in warm water  Wring out the extra water  Apply the heat wrap or towel for 20 minutes every hour, or as directed  · Apply ice as directed  Ice helps relieve pain and swelling, and can help prevent tissue damage  Use an ice pack, or put ice in a bag  Cover the ice pack or back with a towel before you apply it to your neck  Apply the ice pack or ice for 15 minutes every hour, or as directed  Your healthcare provider can tell you how often to apply ice  · Do neck exercises as directed  Neck exercises help strengthen the muscles and increase range of motion  Your healthcare provider will tell you which exercises are right for you  He may give you instructions, or he may recommend that you work with a physical therapist  Your healthcare provider or therapist can make sure you are doing the exercises correctly  · Maintain good posture  Try to keep your head and shoulders lifted when you sit  If you work in front of a computer, make sure the monitor is at the right level  You should not need to look up down to see the screen  You should also not have to lean forward to be able to read what is on the screen   Make sure your keyboard, mouse, and other computer items are placed where you do not have to extend your shoulder to reach them  Get up often if you work in front of a computer or sit for long periods of time  Stretch or walk around to keep your neck muscles loose  Follow up with your healthcare provider as directed: Your healthcare provider may refer you to a specialist if your pain does not get better with treatment  Write down your questions so you remember to ask them during your visits  © Copyright [employer] 2021 Information is for End User's use only and may not be sold, redistributed or otherwise used for commercial purposes  All illustrations and images included in CareNotes® are the copyrighted property of [employer]  or [facility] [place]   The above information is an  only  It is not intended as medical advice for individual conditions or treatments  Talk to your doctor, nurse or pharmacist before following any medical regimen to see if it is safe and effective for you

## 2021-09-01 NOTE — PROGRESS NOTES
Assessment  1  Cervical spondylosis    2  Neck pain        Plan    The Patient's neck pain persists despite time, relative rest, activity modification and therapy  As the patient previously benefited from cervical rhizotomy which lasted approximately three years I think it is reasonable to pursue one diagnostic medial branch block using bupivacaine 0 75%  If he demonstrates an appropriate response he be candidate for the radiofrequency ablation  In the office today, we reviewed the nature of the patient's pathology in depth using  diagrams and models  I discussed the approach we would use for the medial branch block and provided literature for home review  The patient understands the risks associated with the procedure including bleeding, infection, tissue injury, allergic reaction and paralysis and provided written and verbal consent in the office today  My impressions and treatment recommendations were discussed in detail with the patient who verbalized understanding and had no further questions  Discharge instructions were provided  I personally saw and examined the patient and I agree with the above discussed plan of care  This note is created using dictation transcription  It may contain typographical errors, grammatical errors, improperly dictated words, background noise and other errors  Orders Placed This Encounter   Procedures    FL spine and pain procedure     Standing Status:   Future     Standing Expiration Date:   9/1/2025     Order Specific Question:   Reason for Exam:     Answer:   Left C4, C5, C6, and C7 MBB with 0 75% bupivacaine     Order Specific Question:   Anticoagulant hold needed? Answer:   No     No orders of the defined types were placed in this encounter  Referred By: Eri Hinds MD  History of Present Illness    Clare Galindo  is a 78 y o  male who is seen greater than three years ago for left-sided neck pain    At that point he underwent two diagnostic medial branch blocks and radiofrequency which provided significant relief  His [place] started approximately five months ago while mowing grass since then it is moderate to severe rates between nine to 10/10 on the visual analog scale and it is intermittent describes burning shooting and sharp in left side of his neck without any radiation  He denies any weakness  He reports that sitting and exercise increases symptoms  I have personally reviewed and/or updated the patient's past medical history, past surgical history, family history, social history, current medications, allergies, and vital signs today  Review of Systems   Constitutional: Negative for fever and unexpected weight change  HENT: Negative for trouble swallowing  Eyes: Negative for visual disturbance  Respiratory: Negative for shortness of breath and wheezing  Cardiovascular: Negative for chest pain and palpitations  Gastrointestinal: Negative for constipation, diarrhea, nausea and vomiting  Endocrine: Negative for cold intolerance, heat intolerance and polydipsia  Genitourinary: Negative for difficulty urinating and frequency  Musculoskeletal: Positive for myalgias  Negative for arthralgias, gait problem and joint swelling  Skin: Negative for rash  Neurological: Negative for dizziness, seizures, syncope, weakness and headaches  Hematological: Does not bruise/bleed easily  Psychiatric/Behavioral: Negative for dysphoric mood  All other systems reviewed and are negative  Patient Active Problem List   Diagnosis    Spondylosis of cervical region without myelopathy or radiculopathy    Neck pain    Chronic pain syndrome    Gastroesophageal reflux disease without esophagitis    Family history of colon cancer    History of colon polyps    A-fib ([address] )    HTN (hypertension)    Hyperlipidemia    Obesity (BMI 30 0-34  9)    Anxiety    Acute respiratory failure (HCC)    Acute vascular insufficiency of intestine ([facility] [address] )    Arthritis    Aspiration into lower respiratory tract    Hyponatremia    Hypoxemia    Idiopathic osteoarthritis    Insomnia    Intra-abdominal hernia    MARY (obstructive sleep apnea)    Sepsis (HCC)    Small bowel obstruction (HCC)    Umbilical hernia       Past Medical History:   Diagnosis Date    Anxiety     Atrial fibrillation (HCC)     Colon polyp     GERD (gastroesophageal reflux disease)     Hypercholesterolemia     Hypertension        Past Surgical History:   Procedure Laterality Date    COLONOSCOPY      July 2019:  Sessile serrated adenoma  June 2014, hyperplastic polyps and internal hemorrhoids    ESOPHAGOGASTRODUODENOSCOPY      Irregular Z-line and gastritis biopsies negative for H pylori or Newton's    HYDROCELE EXCISION / REPAIR      UMBILICAL HERNIA REPAIR      UPPER GASTROINTESTINAL ENDOSCOPY      2008:  Negative for mass ulcer or Newton's    VOLVULUS REDUCTION  2015       Family History   Problem Relation Age of Onset    Breast cancer Mother     Esophageal cancer Father     Cancer Father     Diabetes Family     Breast cancer Family     Esophageal cancer Family     Colon cancer Family     Colon cancer Brother        Social History     Occupational History    Not on file   Tobacco Use    Smoking status: Never Smoker    Smokeless tobacco: Never Used   Vaping Use    Vaping Use: Never used   Substance and Sexual Activity    Alcohol use:  Yes     Alcohol/week: 4 0 - 5 0 standard drinks     Types: 4 - 5 Glasses of wine per week    Drug use: No    Sexual activity: Not on file       Current Outpatient Medications on File Prior to Visit   Medication Sig    amLODIPine (NORVASC) 10 mg tablet Take 10 mg by mouth daily    carvedilol (COREG) 12 5 mg tablet Take 12 5 mg by mouth 2 (two) times a day    esomeprazole (NexIUM) 40 MG capsule TAKE 1 CAPSULE BY MOUTH EVERY DAY    losartan-hydrochlorothiazide (HYZAAR) 100-25 MG per tablet Take 1 tablet by mouth daily    metoprolol tartrate (LOPRESSOR) 50 mg tablet Take 50 mg by mouth every 12 (twelve) hours    Naproxen Sodium (ALEVE) 220 MG CAPS Take by mouth    PARoxetine (PAXIL) 20 mg tablet Take 20 mg by mouth daily    pravastatin (PRAVACHOL) 40 mg tablet Take 40 mg by mouth daily    rivaroxaban (Xarelto) 20 mg tablet Take 20 mg by mouth daily with breakfast     No current facility-administered medications on file prior to visit  No Known Allergies    Physical Exam    There were no vitals taken for this visit  Constitutional: normal, well developed, well nourished, alert, in no distress and non-toxic and no overt pain behavior  Eyes: anicteric  HEENT: grossly intact  Neck: supple, symmetric, trachea midline and no masses   Pulmonary:even and unlabored  Cardiovascular:No edema or pitting edema present  Skin:Normal without rashes or lesions and well hydrated  Psychiatric:Mood and affect appropriate  Neurologic:Cranial Nerves II-XII grossly intact  Musculoskeletal:normal,   Inspection:  Normal station and gait  Normal cervical curves and head posture  Skin intact without erythema  No sensory loss  There is no atrophy  Palpation:  There is tenderness to palpation overlying the left cervical paraspinals, cervical facet joints  There is no tenderness over the upper trapezius muscles bilateral  No shoulder tenderness  Motor/Strength:  5/5 strength in the bilateral upper extremities  Reflexes:  equal and symmetric in the upper limbs  Sensation:   Sensation intact to light touch and pinprick in the upper limbs  Maneuvers:  Negative Spurling's maneuver  Negative Lhermitte's sign  Positive cervical facet loading on the left with pain with neck extension  Imaging  Xray Lumbar spine @ [facility] 2-26-20  Impression:   Reviewed which reveal moderate degenerative disc disease of the spine  No other osseous abnormalities or lesions were noted  I have personally reviewed pertinent films in PACS

## 2021-09-01 NOTE — TELEPHONE ENCOUNTER
Per request pt called and scheduled       PER OP INSTRUCTIONS:    Reviewed instructions: Instructed pt to arrive at appt time, no earlier  Pt to wear a mask  Pt will need a  but they must wait in the car, NPO 1 hour prior, loose-fitting/comfortable clothes, no buttons/zippers and no ear rings/neckless, if ill/fever/infx/abx to call and reschedule  No immunizations or vaccinations 2w prior/after steroid injections  Hold medication  x _ full days prior, last dose on _  Patient advised to hold medication from Date till their appointment at that time instructions to restart will be given  Patient stated verbal understanding  Aware that nursing will call her to review hold dates as well  COVID DISCLAIMER:    COVID disclaimer/ screening completed  Pt states they have NOT had COVID/no COVID vaccine and are aware no vaccines two weeks before and two weeks after procedure  Because of possible immunosuppression due to steroid, pt is aware that they should practice more strict social distancing, masking, handwashing for 2-3 weeks following procedure  Reviewed check in process with pt- will enter building no earlier than arrival time given, agreed to wear mask for duration of appt,  will wait in car

## 2021-09-07 ENCOUNTER — TELEPHONE (OUTPATIENT)
Dept: OBGYN CLINIC | Facility: CLINIC | Age: 66
End: 2021-09-07

## 2021-09-07 NOTE — TELEPHONE ENCOUNTER
Please reach out for rescheduling, thank you    He would like to know if they is something for 10/11/21?

## 2021-09-08 ENCOUNTER — TELEPHONE (OUTPATIENT)
Dept: RADIOLOGY | Facility: CLINIC | Age: 66
End: 2021-09-08

## 2021-10-04 ENCOUNTER — OFFICE VISIT (OUTPATIENT)
Dept: GASTROENTEROLOGY | Facility: CLINIC | Age: 66
End: 2021-10-04
Payer: COMMERCIAL

## 2021-10-04 VITALS
HEIGHT: 75 IN | WEIGHT: 278 LBS | BODY MASS INDEX: 34.57 KG/M2 | SYSTOLIC BLOOD PRESSURE: 138 MMHG | DIASTOLIC BLOOD PRESSURE: 84 MMHG

## 2021-10-04 DIAGNOSIS — Z80.0 FAMILY HISTORY OF COLON CANCER: ICD-10-CM

## 2021-10-04 DIAGNOSIS — K21.9 GASTROESOPHAGEAL REFLUX DISEASE WITHOUT ESOPHAGITIS: Primary | ICD-10-CM

## 2021-10-04 DIAGNOSIS — Z86.010 HISTORY OF COLON POLYPS: ICD-10-CM

## 2021-10-04 DIAGNOSIS — R13.10 DYSPHAGIA, UNSPECIFIED TYPE: ICD-10-CM

## 2021-10-04 PROCEDURE — 99213 OFFICE O/P EST LOW 20 MIN: CPT | Performed by: INTERNAL MEDICINE

## 2021-10-11 ENCOUNTER — HOSPITAL ENCOUNTER (OUTPATIENT)
Dept: RADIOLOGY | Facility: CLINIC | Age: 66
Discharge: HOME | End: 2021-10-11
Attending: ANESTHESIOLOGY
Payer: COMMERCIAL

## 2021-10-11 VITALS
RESPIRATION RATE: 20 BRPM | HEART RATE: 58 BPM | TEMPERATURE: 97 F | OXYGEN SATURATION: 95 % | DIASTOLIC BLOOD PRESSURE: 78 MMHG | SYSTOLIC BLOOD PRESSURE: 146 MMHG

## 2021-10-11 DIAGNOSIS — M47.812 CERVICAL SPONDYLOSIS: ICD-10-CM

## 2021-10-11 DIAGNOSIS — M54.2 NECK PAIN: ICD-10-CM

## 2021-10-11 PROCEDURE — 64491 INJ PARAVERT F JNT C/T 2 LEV: CPT | Performed by: ANESTHESIOLOGY

## 2021-10-11 PROCEDURE — 64490 INJ PARAVERT F JNT C/T 1 LEV: CPT | Performed by: ANESTHESIOLOGY

## 2021-10-11 PROCEDURE — 64492 INJ PARAVERT F JNT C/T 3 LEV: CPT | Performed by: ANESTHESIOLOGY

## 2021-10-11 RX ORDER — BUPIVACAINE HYDROCHLORIDE 7.5 MG/ML
10 INJECTION, SOLUTION EPIDURAL; RETROBULBAR ONCE
Status: COMPLETED | OUTPATIENT
Start: 2021-10-11 | End: 2021-10-11

## 2021-10-11 RX ADMIN — BUPIVACAINE HYDROCHLORIDE 4 ML: 7.5 INJECTION, SOLUTION EPIDURAL; RETROBULBAR at 14:52

## 2021-10-12 ENCOUNTER — TELEPHONE (OUTPATIENT)
Dept: RADIOLOGY | Facility: CLINIC | Age: 66
End: 2021-10-12

## 2022-02-10 ENCOUNTER — APPOINTMENT (OUTPATIENT)
Dept: RADIOLOGY | Facility: CLINIC | Age: 67
End: 2022-02-10
Payer: COMMERCIAL

## 2022-02-10 ENCOUNTER — OFFICE VISIT (OUTPATIENT)
Dept: OBGYN CLINIC | Facility: CLINIC | Age: 67
End: 2022-02-10
Payer: COMMERCIAL

## 2022-02-10 VITALS — WEIGHT: 281 LBS | SYSTOLIC BLOOD PRESSURE: 132 MMHG | DIASTOLIC BLOOD PRESSURE: 82 MMHG | BODY MASS INDEX: 35.12 KG/M2

## 2022-02-10 DIAGNOSIS — G89.29 CHRONIC PAIN OF RIGHT THUMB: ICD-10-CM

## 2022-02-10 DIAGNOSIS — G89.29 CHRONIC PAIN OF RIGHT THUMB: Primary | ICD-10-CM

## 2022-02-10 DIAGNOSIS — M19.031 LOCALIZED PRIMARY OSTEOARTHRITIS OF CARPOMETACARPAL (CMC) JOINT OF RIGHT WRIST: ICD-10-CM

## 2022-02-10 DIAGNOSIS — M79.644 CHRONIC PAIN OF RIGHT THUMB: Primary | ICD-10-CM

## 2022-02-10 DIAGNOSIS — G89.29 CHRONIC PAIN OF LEFT THUMB: ICD-10-CM

## 2022-02-10 DIAGNOSIS — M19.032 LOCALIZED PRIMARY OSTEOARTHRITIS OF CARPOMETACARPAL (CMC) JOINT OF LEFT WRIST: ICD-10-CM

## 2022-02-10 DIAGNOSIS — M79.645 CHRONIC PAIN OF LEFT THUMB: ICD-10-CM

## 2022-02-10 DIAGNOSIS — M79.644 CHRONIC PAIN OF RIGHT THUMB: ICD-10-CM

## 2022-02-10 PROCEDURE — 73130 X-RAY EXAM OF HAND: CPT

## 2022-02-10 PROCEDURE — 99203 OFFICE O/P NEW LOW 30 MIN: CPT | Performed by: PHYSICIAN ASSISTANT

## 2022-02-10 NOTE — PATIENT INSTRUCTIONS
Thumb Arthroplasty   WHAT YOU NEED TO KNOW:   What do I need to know about thumb arthroplasty? Thumb arthroplasty is surgery to replace part or all of the joint at the base of your thumb  This joint is where your thumb bone and wrist bone meet  How do I prepare for thumb arthroplasty? Your healthcare provider will talk to you about how to prepare for surgery  He may tell you not to eat or drink anything after midnight on the day of your surgery  He will tell you what medicines to take or not take on the day of your surgery  What will happen during thumb arthroplasty? General or regional anesthesia will be given to keep you free from pain during the surgery  Your surgeon will make an incision on the skin over your thumb joint  He will remove part or all of your wrist bone  He may also remove part of your thumb bone  He will reconstruct your joint using cartilage, a tendon taken from your forearm, or an artificial implant  Your surgeon will close your incision with stitches and cover it with bandages  What are the risks of thumb arthroplasty? You may have stiffness in your thumb after surgery  You may bleed more than expected or get an infection  If you received an implant, the implant may get loose, break, or become unstable  CARE AGREEMENT:   You have the right to help plan your care  Learn about your health condition and how it may be treated  Discuss treatment options with your healthcare providers to decide what care you want to receive  You always have the right to refuse treatment  The above information is an  only  It is not intended as medical advice for individual conditions or treatments  Talk to your doctor, nurse or pharmacist before following any medical regimen to see if it is safe and effective for you  © Copyright [facility] 2021 Information is for End User's use only and may not be sold, redistributed or otherwise used for commercial purposes   All illustrations and images included in CareNotes® are the copyrighted property of A D A M , Inc  or Dorothy Watkins

## 2022-02-10 NOTE — PROGRESS NOTES
Orthopaedic Surgery - Office Note  Sowmya Ansari  (77 y o  male)   : 1955   MRN: [id number]  Encounter Date: 2/10/2022    Chief Complaint   Patient presents with    Left Thumb - Pain    Right Thumb - Pain         Assessment/Plan  Diagnoses and all orders for this visit:    Chronic pain of right thumb  -     XR hand 3+ vw right; Future  -     Ambulatory Referral to Occupational Therapy; Future    Chronic pain of left thumb  -     XR hand 3+ vw left; Future  -     Ambulatory Referral to Occupational Therapy; Future    Localized primary osteoarthritis of carpometacarpal [place]) joint of left wrist  -     Ambulatory Referral to Occupational Therapy; Future    Localized primary osteoarthritis of carpometacarpal [place]) joint of right wrist  -     Ambulatory Referral to Occupational Therapy; Future    The diagnosis as well as treatment options were reviewed with the patient in the office today  At this time he has failed most conservative treatments excluding occupational therapy  As it will take several weeks to get in to see the orthopedic surgeon I would recommend trying occupational therapy for a few get visits care towards a home exercise program so all conservative treatments may have been exhausted prior to seeing a hand surgeon  He may continue use of thumb Spicas for symptomatic relief  Patient does not wish to consider an injection today  I do not recommend oral NSAIDs due to concurrent Xarelto use for chronic AFib  Patient may ice the right and left hand 20 minutes on 1 hour off 3 times a day  Patient will return to discuss surgical intervention with Dr Freida Leija  Return with Dr Freida Leija to discuss [facility] surgery R>L pain  History of Present Illness  This is a new patient with right and left thumb pain  Patient is a contributing medical history of cervical neck pathology and follows with pain management    Patient has right greater than left pain at the base of his thumb which is made worse with any use of the right or left hand  He is right-hand dominant  The pain does not radiate  No paresthesias are reported  He is treated for this extensively in the past with Dr Ramon Ruth  He has received injections in the [facility] joint on the right and left hand for the past several years  The most recently was October of 2021 and did not last very long  He has also tried thumb spica bracing for years without relief  He is to the point where he would like to consider surgical treatment  He has not tried any recent occupational therapy  No recent trauma is reported to either hand  Patient is on chronic Xarelto use for atrial fibrillation and does not tolerate oral NSAIDs  Review of Systems  Pertinent items are noted in HPI  All other systems were reviewed and are negative  Physical Exam  /82   Wt 127 kg (281 lb)   BMI 35 12 kg/m²   Cons: Appears well  No apparent distress  Psych: Alert  Oriented x3  Mood and affect normal   Eyes: PERRLA, EOMI  Resp: Normal effort  No audible wheezing or stridor  CV: Palpable pulse  No discernable arrhythmia  Lymph:  No palpable cervical, axillary, or inguinal lymphadenopathy  Skin: Warm  No palpable masses  No visible lesions  Neuro: Normal muscle tone  Normal and symmetric DTR's  Patient's right hand has no skin breakdown lesions or signs of infection  He is tender palpation at the [facility] joint  He has mild instability  He has a positive grind test   Finkelstein's testing causes pain at the [facility] joint  No thenar atrophy of wasting is noted  He has full range of motion of the MCP and IP joints of the thumb  Neurovascularly is grossly intact  Pinch strength is decreased to 4/5    Patient's left hand has no skin breakdown lesions or signs of infection  He is tender to palpation at the [facility] joint  He has a positive CMC grind test   Finkelstein's reproduces pain to the [facility] joint and not the radial styloid    Mild CMC joint instability is noted with stressing  Patient has no anatomical snuffbox tenderness  Pinch strength is decreased to 4/5  He has well-maintained range of motion of the MCP and IP joints of the thumb  Studies Reviewed  X-rays performed in the office today multiple views of the right and left hand show moderate to severe CMC joint osteoarthritis with subluxation  No acute fractures or dislocations are seen  These are read from an orthopedic standpoint will await official radiologist interpretation  Procedures  No procedures today  Medical, Surgical, Family, and Social History  The patient's medical history, family history, and social history, were reviewed and updated as appropriate  Past Medical History:   Diagnosis Date    Anxiety     Atrial fibrillation ([address] )     Colon polyp     GERD (gastroesophageal reflux disease)     Hypercholesterolemia     Hypertension        Past Surgical History:   Procedure Laterality Date    COLONOSCOPY      July 2019:  Sessile serrated adenoma  June 2014, hyperplastic polyps and internal hemorrhoids    ESOPHAGOGASTRODUODENOSCOPY      Irregular Z-line and gastritis biopsies negative for H pylori or Newton's    HYDROCELE EXCISION / REPAIR      UMBILICAL HERNIA REPAIR      UPPER GASTROINTESTINAL ENDOSCOPY      October 2020: Irregular Z-line and gastritis, biopsies negative for EOE, Newton's, H pylori  2008:  Negative for mass ulcer or Newton's    VOLVULUS REDUCTION  2015       Family History   Problem Relation Age of Onset    Breast cancer Mother     Esophageal cancer Father     Cancer Father     Diabetes Family     Breast cancer Family     Esophageal cancer Family     Colon cancer Family     Colon cancer Brother        Social History     Occupational History    Not on file   Tobacco Use    Smoking status: Never Smoker    Smokeless tobacco: Never Used   Vaping Use    Vaping Use: Never used   Substance and Sexual Activity    Alcohol use:  Yes Alcohol/week: 4 0 - 5 0 standard drinks     Types: 4 - 5 Glasses of wine per week    Drug use: No    Sexual activity: Not on file       No Known Allergies      Current Outpatient Medications:     amLODIPine (NORVASC) 10 mg tablet, Take 10 mg by mouth daily, Disp: , Rfl: 1    carvedilol (COREG) 12 5 mg tablet, Take 12 5 mg by mouth 2 (two) times a day, Disp: , Rfl: 1    esomeprazole (NexIUM) 40 MG capsule, TAKE 1 CAPSULE BY MOUTH EVERY DAY, Disp: 90 capsule, Rfl: 3    losartan-hydrochlorothiazide (HYZAAR) 100-25 MG per tablet, Take 1 tablet by mouth daily, Disp: , Rfl: 0    metoprolol tartrate (LOPRESSOR) 50 mg tablet, Take 50 mg by mouth every 12 (twelve) hours, Disp: , Rfl:     Naproxen Sodium (ALEVE) 220 MG CAPS, Take by mouth, Disp: , Rfl:     PARoxetine (PAXIL) 20 mg tablet, Take 20 mg by mouth daily, Disp: , Rfl: 0    pravastatin (PRAVACHOL) 40 mg tablet, Take 40 mg by mouth daily, Disp: , Rfl: 0    rivaroxaban (Xarelto) 20 mg tablet, Take 20 mg by mouth daily with breakfast, Disp: , Rfl:       Arnel Michel PA-C

## 2022-02-24 ENCOUNTER — EVALUATION (OUTPATIENT)
Dept: OCCUPATIONAL THERAPY | Facility: CLINIC | Age: 67
End: 2022-02-24
Payer: COMMERCIAL

## 2022-02-24 DIAGNOSIS — M18.0 OSTEOARTHRITIS OF CARPOMETACARPAL (CMC) JOINTS OF BOTH THUMBS, UNSPECIFIED OSTEOARTHRITIS TYPE: Primary | ICD-10-CM

## 2022-02-24 PROCEDURE — 97760 ORTHOTIC MGMT&TRAING 1ST ENC: CPT | Performed by: OCCUPATIONAL THERAPIST

## 2022-02-24 PROCEDURE — 97165 OT EVAL LOW COMPLEX 30 MIN: CPT | Performed by: OCCUPATIONAL THERAPIST

## 2022-02-24 NOTE — PROGRESS NOTES
OT Evaluation     Today's date: 2022  Patient name: Tre Zhang  : 1955  MRN: [id number]  Referring provider: ROD Jarrett*  Dx:   Encounter Diagnosis     ICD-10-CM    1  Osteoarthritis of carpometacarpal (CMC) joints of both thumbs, unspecified osteoarthritis type  M18 0                   Assessment  Assessment details: Airam Dennis presents with B CMC OA and MCP hyperermobility   He has significant pain with use   He has weak    He has local tenderness   He has + grind test   He works part time  He golfs regularly   He is limited with heavy lifting , gripping , pulling   He lives with his wife   He will be fitted with B CMC orthosis  Impairments: activity intolerance, impaired physical strength, lacks appropriate home exercise program and pain with function  Functional limitations: limited with gripping , lifting , , jars   Symptom irritability: highUnderstanding of Dx/Px/POC: good   Prognosis: good    Goals  STG- 1 wk  1- I with HEP  2- use B CMC orthosis for painful activity     LTG- 6 wks  1- worst pain 3/10  2- I with ADL - lift ,carry  3- improve  10#  4- improve pinches 3 #      Plan  Patient would benefit from: custom splinting, OT eval and skilled occupational therapy  Planned modality interventions: cryotherapy and thermotherapy: hydrocollator packs  Planned therapy interventions: activity modification, joint mobilization, manual therapy, massage, therapeutic activities, stretching, strengthening, functional ROM exercises and home exercise program  Other planned therapy interventions: CMC orthosis  Frequency: 2x week  Duration in weeks: 7  Plan of Care expiration date: 2022  Treatment plan discussed with: patient        Subjective Evaluation    History of Present Illness  Mechanism of injury: Pt reports onset of B thumb pain that started several years ago   He had xrays that showed OA B thumb CMC  He has had injections by Dr Zahraa Vidal     Quality of life: good    Pain  Current pain rating: 3  At best pain ratin  At worst pain rating: 10  Location: B CMC   Quality: sharp  Progression: worsening    Social Support  Steps to enter house: yes  Stairs in house: yes   Lives in: multiple-level home  Lives with: spouse    Employment status: working (20 hr week)  Hand dominance: right    Treatments  No previous or current treatments  Patient Goals  Patient goals for therapy: increased strength, independence with ADLs/IADLs and decreased pain          Objective     Tenderness     Additional Tenderness Details  Tender B CMC     Neurological Testing     Additional Neurological Details  Denies parasthesias    Active Range of Motion     Left Wrist   Normal active range of motion    Right Wrist   Normal active range of motion    Left Thumb   Opposition: opp to 5th MCP     Right Thumb   Opposition: opp to 5th MCP digit     Hypermobile MCP     Additional Active Range of Motion Details  Digit ROM WNL     Strength/Myotome Testing     Left Wrist/Hand      (2nd hand position)     Trial 1: 35    Thumb Strength  Key/Lateral Pinch     Trial 1: 12  Palmar/Three-Point Pinch     Trial 1: 5    Right Wrist/Hand      (2nd hand position)     Trial 1: 25    Thumb Strength   Key/Lateral Pinch     Trial 1: 13  Palmar/Three-Point Pinch     Trial 1: 8    Additional Strength Details  Pain with gripping and pinching     Tests     Left Wrist/Hand   Positive CMC grind  Right Wrist/Hand   Positive CMC grind                Precautions: universal       Manuals /            graston              MOB                                       HEP  4 xday             Custom B CMC orthosis 18m                                                                                          Ther Ex                                                                                                                     Ther Activity                                       Gait Training Modalities

## 2022-02-28 ENCOUNTER — OFFICE VISIT (OUTPATIENT)
Dept: OCCUPATIONAL THERAPY | Facility: CLINIC | Age: 67
End: 2022-02-28
Payer: COMMERCIAL

## 2022-02-28 DIAGNOSIS — M19.032 LOCALIZED PRIMARY OSTEOARTHRITIS OF CARPOMETACARPAL (CMC) JOINT OF LEFT WRIST: ICD-10-CM

## 2022-02-28 DIAGNOSIS — G89.29 CHRONIC PAIN OF RIGHT THUMB: ICD-10-CM

## 2022-02-28 DIAGNOSIS — M79.645 CHRONIC PAIN OF LEFT THUMB: ICD-10-CM

## 2022-02-28 DIAGNOSIS — M19.031 LOCALIZED PRIMARY OSTEOARTHRITIS OF CARPOMETACARPAL (CMC) JOINT OF RIGHT WRIST: ICD-10-CM

## 2022-02-28 DIAGNOSIS — M79.644 CHRONIC PAIN OF RIGHT THUMB: ICD-10-CM

## 2022-02-28 DIAGNOSIS — M18.0 OSTEOARTHRITIS OF CARPOMETACARPAL (CMC) JOINTS OF BOTH THUMBS, UNSPECIFIED OSTEOARTHRITIS TYPE: Primary | ICD-10-CM

## 2022-02-28 DIAGNOSIS — G89.29 CHRONIC PAIN OF LEFT THUMB: ICD-10-CM

## 2022-02-28 PROCEDURE — 97140 MANUAL THERAPY 1/> REGIONS: CPT | Performed by: OCCUPATIONAL THERAPIST

## 2022-02-28 PROCEDURE — 97110 THERAPEUTIC EXERCISES: CPT | Performed by: OCCUPATIONAL THERAPIST

## 2022-02-28 NOTE — PROGRESS NOTES
Daily Note     Today's date: 2022  Patient name: Hyun Byrd  : 1955  MRN: [id number]  Referring provider: ROD Strange*  Dx:   Encounter Diagnosis     ICD-10-CM    1  Osteoarthritis of carpometacarpal (CMC) joints of both thumbs, unspecified osteoarthritis type  M18 0    2  Chronic pain of right thumb  M79 644 Ambulatory Referral to Occupational Therapy    G89 29    3  Chronic pain of left thumb  M79 645 Ambulatory Referral to Occupational Therapy    G89 29    4  Localized primary osteoarthritis of carpometacarpal [place]) joint of left wrist  M19 032 Ambulatory Referral to Occupational Therapy   5  Localized primary osteoarthritis of carpometacarpal [place]) joint of right wrist  M19 031 Ambulatory Referral to Occupational Therapy                  Subjective: Both of my thumbs bother me  Objective: See treatment diary below      Assessment: Tolerated treatment fair  Patient compliant with HB TSS's  Less pain with joint protection techniques  Plan: Continue per plan of care        Precautions: universal       Manuals            graston   4m           MOB  4m                                     HEP  4 xday             Custom B CMC orthosis 18m                                                                                          Ther Ex             Wrist PROm  2m           Wrist curls B/L  #4 3x10           Gripping  BPW 2x30                                                                            Ther Activity             opposition  Small pegs x30                        Gait Training                                       Modalities             MH B/L  10m           CP   5m

## 2022-03-07 ENCOUNTER — OFFICE VISIT (OUTPATIENT)
Dept: OCCUPATIONAL THERAPY | Facility: CLINIC | Age: 67
End: 2022-03-07
Payer: COMMERCIAL

## 2022-03-07 DIAGNOSIS — M18.0 OSTEOARTHRITIS OF CARPOMETACARPAL (CMC) JOINTS OF BOTH THUMBS, UNSPECIFIED OSTEOARTHRITIS TYPE: Primary | ICD-10-CM

## 2022-03-07 PROCEDURE — 97110 THERAPEUTIC EXERCISES: CPT | Performed by: OCCUPATIONAL THERAPIST

## 2022-03-07 PROCEDURE — 97140 MANUAL THERAPY 1/> REGIONS: CPT | Performed by: OCCUPATIONAL THERAPIST

## 2022-03-07 NOTE — PROGRESS NOTES
Daily Note     Today's date: 3/7/2022  Patient name: Jessica Aguilera  : 1955  MRN: [id number]  Referring provider: ROD Dc*  Dx:   Encounter Diagnosis     ICD-10-CM    1  Osteoarthritis of carpometacarpal (CMC) joints of both thumbs, unspecified osteoarthritis type  M18 0                   Subjective: I still have pain , it shoots      Objective: See treatment diary below      Assessment: Tolerated treatment fair  Patient has continued pain   Manual tx with Kuldeep Haile  Plan: Continue per plan of care        Precautions: universal       Manuals 2/24 2/28 3/7          graston   4m 4m          MOB  4m 4m          MFR - add, thenar   4m                       HEP  4 xday             Custom B CMC orthosis 18m                                                                                          Ther Ex             Wrist PROm  2m 4m          Wrist curls B/L  #4 3x10 4#  3x10          Gripping  BPW 2x30 BPW  2x30                                                                           Ther Activity             opposition  Small pegs x30 x30                       Gait Training                                       Modalities             MH B/L  10m 8m          CP   5m

## 2022-03-14 ENCOUNTER — OFFICE VISIT (OUTPATIENT)
Dept: OCCUPATIONAL THERAPY | Facility: CLINIC | Age: 67
End: 2022-03-14
Payer: COMMERCIAL

## 2022-03-14 DIAGNOSIS — M19.031 LOCALIZED PRIMARY OSTEOARTHRITIS OF CARPOMETACARPAL (CMC) JOINT OF RIGHT WRIST: Primary | ICD-10-CM

## 2022-03-14 PROCEDURE — 97140 MANUAL THERAPY 1/> REGIONS: CPT | Performed by: OCCUPATIONAL THERAPIST

## 2022-03-14 PROCEDURE — 97110 THERAPEUTIC EXERCISES: CPT | Performed by: OCCUPATIONAL THERAPIST

## 2022-03-14 NOTE — PROGRESS NOTES
Daily Note     Today's date: 3/14/2022  Patient name: Joanna Daniel  : 1955  MRN: [id number]  Referring provider: ROD Esparza*  Dx:   Encounter Diagnosis     ICD-10-CM    1  Localized primary osteoarthritis of carpometacarpal [place]) joint of right wrist  M19 031                   Subjective: The pain is the same  Objective: See treatment diary below      Assessment: Tolerated treatment fair  Patient has continued pain   Pt is independent with symptom management, CMC HEP and joint protection  Treatment to be discontinued at this time and pt  Will continue with HEP until MD appointment with hand surgeon in May  Plan: D/C from OT       Precautions: universal       Manuals 2/24 2/28 3/7 3/14         graston   4m 4m 4m         MOB  4m 4m 4m         MFR - add, thenar   4m 4m                      HEP  4 xday             Custom B CMC orthosis 18m                                                                                          Ther Ex             Wrist PROm  2m 4m 4m         Wrist curls B/L  #4 3x10 4#  3x10 #4 3x10         Gripping  BPW 2x30 BPW  2x30 BPW 2x30                                                                          Ther Activity             opposition  Small pegs x30 x30          Clips-thenar    Green 3 sets          Gait Training                                       Modalities             MH B/L  10m 8m          CP   5m 5m

## 2022-05-05 DIAGNOSIS — K21.9 GASTROESOPHAGEAL REFLUX DISEASE WITHOUT ESOPHAGITIS: ICD-10-CM

## 2022-05-05 RX ORDER — ESOMEPRAZOLE MAGNESIUM 40 MG/1
CAPSULE, DELAYED RELEASE ORAL
Qty: 90 CAPSULE | Refills: 3 | Status: SHIPPED | OUTPATIENT
Start: 2022-05-05

## 2022-05-09 ENCOUNTER — TELEPHONE (OUTPATIENT)
Dept: OBGYN CLINIC | Facility: HOSPITAL | Age: 67
End: 2022-05-09

## 2022-05-09 NOTE — TELEPHONE ENCOUNTER
Patient came into the [address] [place] [address] office and rescheduled to [facility] Guillaume THORNTON for Tues 5/10/22

## 2022-05-09 NOTE — TELEPHONE ENCOUNTER
Lm to move appointment to Steph Mendoza PA-C on 5/10/22 at [facility]   Dr Kimberly Cee not available for today

## 2022-05-10 ENCOUNTER — PREP FOR PROCEDURE (OUTPATIENT)
Dept: OBGYN CLINIC | Facility: CLINIC | Age: 67
End: 2022-05-10

## 2022-05-10 ENCOUNTER — OFFICE VISIT (OUTPATIENT)
Dept: OBGYN CLINIC | Facility: CLINIC | Age: 67
End: 2022-05-10
Payer: COMMERCIAL

## 2022-05-10 VITALS
WEIGHT: 277.5 LBS | HEIGHT: 75 IN | SYSTOLIC BLOOD PRESSURE: 138 MMHG | BODY MASS INDEX: 34.5 KG/M2 | DIASTOLIC BLOOD PRESSURE: 84 MMHG

## 2022-05-10 DIAGNOSIS — M18.11 ARTHRITIS OF CARPOMETACARPAL (CMC) JOINT OF RIGHT THUMB: Primary | ICD-10-CM

## 2022-05-10 DIAGNOSIS — M19.032 LOCALIZED PRIMARY OSTEOARTHRITIS OF CARPOMETACARPAL (CMC) JOINT OF LEFT WRIST: Primary | ICD-10-CM

## 2022-05-10 PROCEDURE — 99204 OFFICE O/P NEW MOD 45 MIN: CPT | Performed by: PHYSICIAN ASSISTANT

## 2022-05-12 NOTE — PROGRESS NOTES
ASSESSMENT/PLAN:    Assessment:   Thumb CMC Arthritis  bilateral    Plan:   Thumb Interpositional Arthroplasty  right  - discussed with patient that he may require MP joint pinning    Follow Up: After Surgery    To Do Next Visit:  Sutures out    Operative Discussions:  Thumb CMC Interpositional Arthroplasty:   The anatomy and physiology of carpometacarpal joint arthritis was discussed with the patient today in the office  Deterioration of the articular cartilage eventually leads to hypermobility at the thumb [facility] joint, resulting in joint subluxation, osteophyte formation, cystic changes within the trapezium and base of the first metacarpal, as well as subchondral sclerosis  Eventually, pain, limited mobility, and compensatory hyperextension at the metacarpophalangeal joint may develop  While normal activity and usage of the thumb joint may provide a painful experience to the patient, this typically does not result in damage to the thumb or hand  Treatment options include resting thumb spica splints to decreased joint edema, pain, and inflammation  Therapy exercises to strengthen the thenar musculature may relieve pain, but do not alter the overall continued development of osteoarthritis  Oral medications, topical medications, corticosteroid injections may decrease pain and increase overall function  Eventually, approximately 5% of patients may require surgical intervention  The risks and benefit of surgery were discussed with the patient  The patient is aware of the incision, the possible harvesting of the flexor carpi radialis tendon, as well as the interposition portion of the procedure  After the procedure, the pain will be decreased, the function improved, and the strength improved  The potential for numbness around the incision site was also discussed    The patient is aware that in the postoperative period, a bulky dressing will be worn for roughly 10 days, followed by a removable comfort cool device for 4 additional weeks  This comfort cool may be removed for showering, bathing, and daily therapy exercises  At 6 weeks postoperative, strengthening will continue for the next 6-8 weeks  The patient is aware that the average total recovery time is approximately 3-4 months  Success rate is approximately 90-95%  The risks and benefits of the procedure were explained to the patient, which include, but are not limited to: Bleeding, infection, recurrence, pain, scar, damage to tendons, damage to nerves, and damage to blood vessels, failure to give desired results and complications related to anesthesia  These risks, along with alternative conservative treatment options, and postoperative protocols were voiced back and understood by the patient  All questions were answered to the patient's satisfaction  The patient agrees to comply with a standard postoperative protocol, and is willing to proceed  Education was provided via written and auditory forms  There were no barriers to learning  Written handouts regarding wound care, incision and scar care, and general preoperative information was provided to the patient  Prior to surgery, the patient may be requested to stop all anti-inflammatory medications  Prophylactic aspirin, Plavix, and Coumadin may be allowed to be continued  Medications including vitamin E , ginkgo, and fish oil are requested to be stopped approximately one week prior to surgery  Hypertensive medications and beta blockers, if taken, should be continued  Reviewed with patient the risks and benefits of operative intervention  Reviewed operative and post operative course  Reviewed with patient expected recovery time (with or without MP joint pinning)   Patient would like to proceed with surgery     _____________________________________________________  CHIEF COMPLAINT:  Chief Complaint   Patient presents with    Right Hand - Pain    Left Hand - Pain         SUBJECTIVE:  Anuj Salazar Jenny Haji  is a 78 y o  male who presents with bilateral thumb pain, right worse than left  Patient states that this has been ongoing for years, and was previously treated at a different health system  Patient has tried OTC medications, splinting with both hard and soft splints, and steroid injections  Patient is no longer getting adequate or sustained improvement with any of these modalities  At this time, patient would like to discuss operative intervention  PAST MEDICAL HISTORY:  Past Medical History:   Diagnosis Date    Anxiety     Atrial fibrillation ([address] )     Colon polyp     GERD (gastroesophageal reflux disease)     Hypercholesterolemia     Hypertension        PAST SURGICAL HISTORY:  Past Surgical History:   Procedure Laterality Date    COLONOSCOPY      July 2019:  Sessile serrated adenoma  June 2014, hyperplastic polyps and internal hemorrhoids    ESOPHAGOGASTRODUODENOSCOPY      Irregular Z-line and gastritis biopsies negative for H pylori or Newton's    HYDROCELE EXCISION / REPAIR      UMBILICAL HERNIA REPAIR      UPPER GASTROINTESTINAL ENDOSCOPY      October 2020: Irregular Z-line and gastritis, biopsies negative for EOE, Newton's, H pylori  2008:  Negative for mass ulcer or Newton's    VOLVULUS REDUCTION  2015       FAMILY HISTORY:  Family History   Problem Relation Age of Onset   Nahomy Rosario Breast cancer Mother     Esophageal cancer Father     Cancer Father     Diabetes Family     Breast cancer Family     Esophageal cancer Family     Colon cancer Family     Colon cancer Brother        SOCIAL HISTORY:  Social History     Tobacco Use    Smoking status: Never Smoker    Smokeless tobacco: Never Used   Vaping Use    Vaping Use: Never used   Substance Use Topics    Alcohol use:  Yes     Alcohol/week: 4 0 - 5 0 standard drinks     Types: 4 - 5 Glasses of wine per week    Drug use: No       MEDICATIONS:    Current Outpatient Medications:     amLODIPine (NORVASC) 10 mg tablet, Take 10 mg by mouth daily, Disp: , Rfl: 1    esomeprazole (NexIUM) 40 MG capsule, TAKE 1 CAPSULE BY MOUTH EVERY DAY, Disp: 90 capsule, Rfl: 3    losartan-hydrochlorothiazide (HYZAAR) 100-25 MG per tablet, Take 1 tablet by mouth daily, Disp: , Rfl: 0    metoprolol tartrate (LOPRESSOR) 50 mg tablet, Take 50 mg by mouth every 12 (twelve) hours, Disp: , Rfl:     PARoxetine (PAXIL) 20 mg tablet, Take 20 mg by mouth daily, Disp: , Rfl: 0    pravastatin (PRAVACHOL) 40 mg tablet, Take 40 mg by mouth daily, Disp: , Rfl: 0    rivaroxaban (Xarelto) 20 mg tablet, Take 20 mg by mouth daily with breakfast, Disp: , Rfl:     carvedilol (COREG) 12 5 mg tablet, Take 12 5 mg by mouth 2 (two) times a day, Disp: , Rfl: 1    Naproxen Sodium (ALEVE) 220 MG CAPS, Take by mouth, Disp: , Rfl:     ALLERGIES:  No Known Allergies    REVIEW OF SYSTEMS:  Pertinent items are noted in HPI  A comprehensive review of systems was negative      LABS:  HgA1c:   Lab Results   Component Value Date    HGBA1C 5 2 09/01/2021     BMP: No results found for: GLUCOSE, CALCIUM, NA, K, CO2, CL, BUN, CREATININE      _____________________________________________________  PHYSICAL EXAMINATION:  Vital signs: /84   Ht 6' 3" (1 905 m)   Wt 126 kg (277 lb 8 oz)   BMI 34 69 kg/m²   General: well developed and well nourished, alert, oriented times 3 and appears comfortable  Psychiatric: Normal  HEENT: Trachea Midline, No torticollis  Cardiovascular: No discernable arrhythmia  Pulmonary: No wheezing or stridor  Abdomen: No rebound or guarding  Extremities: No peripheral edema  Skin: No masses, erythema, lacerations, fluctation, ulcerations  Neurovascular: Sensation Intact to the Median, Ulnar, Radial Nerve, Motor Intact to the Median, Ulnar, Radial Nerve and Pulses Intact    MUSCULOSKELETAL EXAMINATION:  LEFT SIDE:  CMC: Positive grind, Positive tendnerness CMC, Positive Shoulder Sign, Positive Hyperextension MP and and measures 30 degrees  RIGHT SIDE:  CMC: Positive grind, Positive tendnerness CMC, Positive Shoulder Sign, Positive Hyperextension MP and and measures 30 degrees    _____________________________________________________  STUDIES REVIEWED:  Images were reviewed in PACS by Dr Kaaren Kanner and demonstrate: significant degenerative changes noted in the thumb CMC joints bilaterally      PROCEDURES PERFORMED:  Procedures  No Procedures performed today

## 2022-06-21 LAB
BASOPHILS # BLD AUTO: 0 X10E3/UL (ref 0–0.2)
BASOPHILS NFR BLD AUTO: 1 %
BUN SERPL-MCNC: 16 MG/DL (ref 8–27)
BUN/CREAT SERPL: 21 (ref 10–24)
CALCIUM SERPL-MCNC: 9 MG/DL (ref 8.6–10.2)
CHLORIDE SERPL-SCNC: 100 MMOL/L (ref 96–106)
CO2 SERPL-SCNC: 24 MMOL/L (ref 20–29)
CREAT SERPL-MCNC: 0.78 MG/DL (ref 0.76–1.27)
EGFR: 98 ML/MIN/1.73
EOSINOPHIL # BLD AUTO: 0.3 X10E3/UL (ref 0–0.4)
EOSINOPHIL NFR BLD AUTO: 6 %
ERYTHROCYTE [DISTWIDTH] IN BLOOD BY AUTOMATED COUNT: 12.9 % (ref 11.6–15.4)
GLUCOSE SERPL-MCNC: 105 MG/DL (ref 65–99)
HCT VFR BLD AUTO: 43.4 % (ref 37.5–51)
HGB BLD-MCNC: 14.8 G/DL (ref 13–17.7)
IMM GRANULOCYTES # BLD: 0 X10E3/UL (ref 0–0.1)
IMM GRANULOCYTES NFR BLD: 1 %
LYMPHOCYTES # BLD AUTO: 1 X10E3/UL (ref 0.7–3.1)
LYMPHOCYTES NFR BLD AUTO: 24 %
MCH RBC QN AUTO: 31.1 PG (ref 26.6–33)
MCHC RBC AUTO-ENTMCNC: 34.1 G/DL (ref 31.5–35.7)
MCV RBC AUTO: 91 FL (ref 79–97)
MONOCYTES # BLD AUTO: 0.6 X10E3/UL (ref 0.1–0.9)
MONOCYTES NFR BLD AUTO: 14 %
NEUTROPHILS # BLD AUTO: 2.2 X10E3/UL (ref 1.4–7)
NEUTROPHILS NFR BLD AUTO: 54 %
PLATELET # BLD AUTO: 185 X10E3/UL (ref 150–450)
POTASSIUM SERPL-SCNC: 4.5 MMOL/L (ref 3.5–5.2)
RBC # BLD AUTO: 4.76 X10E6/UL (ref 4.14–5.8)
SODIUM SERPL-SCNC: 137 MMOL/L (ref 134–144)
WBC # BLD AUTO: 4 X10E3/UL (ref 3.4–10.8)

## 2022-07-05 ENCOUNTER — ANESTHESIA EVENT (OUTPATIENT)
Dept: PERIOP | Facility: HOSPITAL | Age: 67
End: 2022-07-05
Payer: COMMERCIAL

## 2022-07-07 NOTE — PRE-PROCEDURE INSTRUCTIONS
Pre-Surgery Instructions:   Medication Instructions    amLODIPine (NORVASC) 10 mg tablet Take day of surgery   esomeprazole (NexIUM) 40 MG capsule Take day of surgery   losartan-hydrochlorothiazide (HYZAAR) 100-25 MG per tablet Hold day of surgery   metoprolol tartrate (LOPRESSOR) 50 mg tablet Take day of surgery   PARoxetine (PAXIL) 20 mg tablet Take day of surgery   pravastatin (PRAVACHOL) 40 mg tablet Hold day of surgery   rivaroxaban (XARELTO) 20 mg tablet Instructions provided by MD CEDENO after midnight, meds in am with sips of water  Understands when to expect preop phone call  Remove all jewelry  Advised of visitation policy  Before your operation, you play an important role in decreasing your risk for infection by washing with special antiseptic soap  This is an effective way to reduce bacteria on the skin which may help to prevent infections at the surgical site  Please read the following directions in advance  1  In the week before your operation purchase a 4 ounce bottle of antiseptic soap containing chlorhexidine gluconate 4%  Some brand names include: Aplicare, Endure, and Hibiclens  The cost is usually less than $5 00  · For your convenience, the [address] [place] [address] carries the soap  · It may also be available at your doctor's office or pre-admission testing center, and at most retail pharmacies  · If you are allergic or sensitive to soaps containing chlorhexidine gluconate (CHG), please let your doctor know so another antiseptic soap can be suggested  · CHG antiseptic soap is for external use only  2      The day before your operation follow these directions carefully to get ready  · Place clean lines (sheets) on your bed; you should sleep on clean sheets after your evening shower  · Get clean towels and washcloths ready - you need enough for 2 showers  · Set aside clean underwear, pajamas, and clothing to wear after the shower      Reminders:  · DO NOT use any other soap or body rinse on your skin during or after the antiseptic showers  · DO NOT use lotion , powder, deodorant, or perfume/aftershave of any kind on your skin after your antiseptic shower  · DO NOT shave any body parts in the 24 hours/the day before your operation  · DO NOT get the antiseptic soap in your eyes, ears, nose, mouth, or vaginal area  3      You will need to shower the night before AND the morning of your Surgery  Shower 1:  · The evening before your operation, take the fist shower  · First, shampoo your hair with regular shampoo and rinse it completely before you use the anitseptic soap  After washing and rinsing your hair, rinse your body  · Next, use a clean wash cloth to apply the antiseptic soap and wash your body from the neck down to your toes using 1/2 bottle of the antiseptic soap  You will use the other 1/2 bottle for the second shower  · Clean the area where your incision will be; later this area well for about 2 minutes  · If you ar having head or neck surgery, wash areas with the antiseptic soap  · Rinse yourself completely with running water  · Use a clean towel to dry off  · Wear clean underwear and clothing/pajamas  Shower 2:  · The Morning of your operation, take the second shower following the same steps as Shower 1 using the second 1/2 of the bottle of antiseptic soap  · Use clean cloths and towels to was and dry yourself off  · Wear clean underwear and clothing

## 2022-07-14 ENCOUNTER — ANESTHESIA (OUTPATIENT)
Dept: PERIOP | Facility: HOSPITAL | Age: 67
End: 2022-07-14
Payer: COMMERCIAL

## 2022-07-14 ENCOUNTER — HOSPITAL ENCOUNTER (OUTPATIENT)
Facility: HOSPITAL | Age: 67
Setting detail: OUTPATIENT SURGERY
Discharge: HOME | End: 2022-07-14
Attending: ORTHOPAEDIC SURGERY | Admitting: ORTHOPAEDIC SURGERY
Payer: COMMERCIAL

## 2022-07-14 VITALS
BODY MASS INDEX: 33.25 KG/M2 | WEIGHT: 267.4 LBS | RESPIRATION RATE: 21 BRPM | DIASTOLIC BLOOD PRESSURE: 81 MMHG | TEMPERATURE: 98.5 F | OXYGEN SATURATION: 93 % | HEIGHT: 75 IN | HEART RATE: 64 BPM | SYSTOLIC BLOOD PRESSURE: 145 MMHG

## 2022-07-14 DIAGNOSIS — M18.11 ARTHRITIS OF CARPOMETACARPAL (CMC) JOINT OF RIGHT THUMB: Primary | ICD-10-CM

## 2022-07-14 PROBLEM — J96.00 ACUTE RESPIRATORY FAILURE (HCC): Status: RESOLVED | Noted: 2021-09-01 | Resolved: 2022-07-14

## 2022-07-14 PROBLEM — K56.609 SMALL BOWEL OBSTRUCTION (HCC): Status: RESOLVED | Noted: 2021-09-01 | Resolved: 2022-07-14

## 2022-07-14 PROBLEM — T17.800A ASPIRATION INTO LOWER RESPIRATORY TRACT: Status: RESOLVED | Noted: 2021-09-01 | Resolved: 2022-07-14

## 2022-07-14 PROCEDURE — 25447 ARTHRP NTRCRP/CRP/MTCR NTRPS: CPT | Performed by: ORTHOPAEDIC SURGERY

## 2022-07-14 PROCEDURE — C1713 ANCHOR/SCREW BN/BN,TIS/BN: HCPCS | Performed by: ORTHOPAEDIC SURGERY

## 2022-07-14 PROCEDURE — NC001 PR NO CHARGE: Performed by: PHYSICIAN ASSISTANT

## 2022-07-14 RX ORDER — FENTANYL CITRATE 50 UG/ML
INJECTION, SOLUTION INTRAMUSCULAR; INTRAVENOUS AS NEEDED
Status: DISCONTINUED | OUTPATIENT
Start: 2022-07-14 | End: 2022-07-14

## 2022-07-14 RX ORDER — HYDROCODONE BITARTRATE AND ACETAMINOPHEN 5; 325 MG/1; MG/1
1 TABLET ORAL EVERY 6 HOURS PRN
Qty: 10 TABLET | Refills: 0 | Status: SHIPPED | OUTPATIENT
Start: 2022-07-14 | End: 2022-07-19

## 2022-07-14 RX ORDER — LIDOCAINE HYDROCHLORIDE 10 MG/ML
INJECTION, SOLUTION EPIDURAL; INFILTRATION; INTRACAUDAL; PERINEURAL AS NEEDED
Status: DISCONTINUED | OUTPATIENT
Start: 2022-07-14 | End: 2022-07-14

## 2022-07-14 RX ORDER — MIDAZOLAM HYDROCHLORIDE 2 MG/2ML
INJECTION, SOLUTION INTRAMUSCULAR; INTRAVENOUS
Status: COMPLETED | OUTPATIENT
Start: 2022-07-14 | End: 2022-07-14

## 2022-07-14 RX ORDER — CEFAZOLIN SODIUM 2 G/50ML
SOLUTION INTRAVENOUS AS NEEDED
Status: DISCONTINUED | OUTPATIENT
Start: 2022-07-14 | End: 2022-07-14

## 2022-07-14 RX ORDER — CEFAZOLIN SODIUM 1 G/50ML
SOLUTION INTRAVENOUS AS NEEDED
Status: DISCONTINUED | OUTPATIENT
Start: 2022-07-14 | End: 2022-07-14

## 2022-07-14 RX ORDER — SENNOSIDES 8.6 MG
650 CAPSULE ORAL EVERY 8 HOURS PRN
Qty: 30 TABLET | Refills: 0 | Status: SHIPPED | OUTPATIENT
Start: 2022-07-14 | End: 2022-09-12 | Stop reason: ALTCHOICE

## 2022-07-14 RX ORDER — ONDANSETRON 2 MG/ML
INJECTION INTRAMUSCULAR; INTRAVENOUS AS NEEDED
Status: DISCONTINUED | OUTPATIENT
Start: 2022-07-14 | End: 2022-07-14

## 2022-07-14 RX ORDER — DEXAMETHASONE SODIUM PHOSPHATE 10 MG/ML
INJECTION, SOLUTION INTRAMUSCULAR; INTRAVENOUS AS NEEDED
Status: DISCONTINUED | OUTPATIENT
Start: 2022-07-14 | End: 2022-07-14

## 2022-07-14 RX ORDER — FENTANYL CITRATE/PF 50 MCG/ML
25 SYRINGE (ML) INJECTION
Status: DISCONTINUED | OUTPATIENT
Start: 2022-07-14 | End: 2022-07-14 | Stop reason: HOSPADM

## 2022-07-14 RX ORDER — ROPIVACAINE HYDROCHLORIDE 5 MG/ML
INJECTION, SOLUTION EPIDURAL; INFILTRATION; PERINEURAL
Status: COMPLETED | OUTPATIENT
Start: 2022-07-14 | End: 2022-07-14

## 2022-07-14 RX ORDER — EPHEDRINE SULFATE 50 MG/ML
INJECTION INTRAVENOUS AS NEEDED
Status: DISCONTINUED | OUTPATIENT
Start: 2022-07-14 | End: 2022-07-14

## 2022-07-14 RX ORDER — CEFAZOLIN SODIUM 2 G/50ML
2000 SOLUTION INTRAVENOUS ONCE
Status: DISCONTINUED | OUTPATIENT
Start: 2022-07-14 | End: 2022-07-14 | Stop reason: HOSPADM

## 2022-07-14 RX ORDER — ONDANSETRON 2 MG/ML
4 INJECTION INTRAMUSCULAR; INTRAVENOUS ONCE AS NEEDED
Status: DISCONTINUED | OUTPATIENT
Start: 2022-07-14 | End: 2022-07-14 | Stop reason: HOSPADM

## 2022-07-14 RX ORDER — NAPROXEN SODIUM 220 MG
220 TABLET ORAL 2 TIMES DAILY WITH MEALS
Qty: 20 TABLET | Refills: 0 | Status: SHIPPED | OUTPATIENT
Start: 2022-07-14 | End: 2022-09-12 | Stop reason: ALTCHOICE

## 2022-07-14 RX ORDER — PROPOFOL 10 MG/ML
INJECTION, EMULSION INTRAVENOUS AS NEEDED
Status: DISCONTINUED | OUTPATIENT
Start: 2022-07-14 | End: 2022-07-14

## 2022-07-14 RX ORDER — CEFAZOLIN SODIUM 1 G/50ML
1000 SOLUTION INTRAVENOUS ONCE
Status: DISCONTINUED | OUTPATIENT
Start: 2022-07-14 | End: 2022-07-14 | Stop reason: HOSPADM

## 2022-07-14 RX ORDER — LIDOCAINE HYDROCHLORIDE 10 MG/ML
INJECTION, SOLUTION EPIDURAL; INFILTRATION; INTRACAUDAL; PERINEURAL
Status: COMPLETED | OUTPATIENT
Start: 2022-07-14 | End: 2022-07-14

## 2022-07-14 RX ORDER — SODIUM CHLORIDE, SODIUM LACTATE, POTASSIUM CHLORIDE, CALCIUM CHLORIDE 600; 310; 30; 20 MG/100ML; MG/100ML; MG/100ML; MG/100ML
125 INJECTION, SOLUTION INTRAVENOUS CONTINUOUS
Status: DISCONTINUED | OUTPATIENT
Start: 2022-07-14 | End: 2022-07-14 | Stop reason: HOSPADM

## 2022-07-14 RX ORDER — LIDOCAINE HYDROCHLORIDE 10 MG/ML
0.5 INJECTION, SOLUTION EPIDURAL; INFILTRATION; INTRACAUDAL; PERINEURAL ONCE AS NEEDED
Status: DISCONTINUED | OUTPATIENT
Start: 2022-07-14 | End: 2022-07-14 | Stop reason: HOSPADM

## 2022-07-14 RX ADMIN — FENTANYL CITRATE 50 MCG: 50 INJECTION, SOLUTION INTRAMUSCULAR; INTRAVENOUS at 10:54

## 2022-07-14 RX ADMIN — PROPOFOL 100 MG: 10 INJECTION, EMULSION INTRAVENOUS at 11:00

## 2022-07-14 RX ADMIN — LIDOCAINE HYDROCHLORIDE 3 ML: 10 INJECTION, SOLUTION EPIDURAL; INFILTRATION; INTRACAUDAL at 10:09

## 2022-07-14 RX ADMIN — ONDANSETRON 4 MG: 2 INJECTION INTRAMUSCULAR; INTRAVENOUS at 11:00

## 2022-07-14 RX ADMIN — CEFAZOLIN SODIUM 1000 MG: 1 SOLUTION INTRAVENOUS at 10:55

## 2022-07-14 RX ADMIN — MIDAZOLAM 2 MG: 1 INJECTION INTRAMUSCULAR; INTRAVENOUS at 10:07

## 2022-07-14 RX ADMIN — SODIUM CHLORIDE, SODIUM LACTATE, POTASSIUM CHLORIDE, AND CALCIUM CHLORIDE 125 ML/HR: .6; .31; .03; .02 INJECTION, SOLUTION INTRAVENOUS at 09:45

## 2022-07-14 RX ADMIN — CEFAZOLIN SODIUM 2000 MG: 2 SOLUTION INTRAVENOUS at 10:51

## 2022-07-14 RX ADMIN — PROPOFOL 200 MG: 10 INJECTION, EMULSION INTRAVENOUS at 10:55

## 2022-07-14 RX ADMIN — EPHEDRINE SULFATE 10 MG: 50 INJECTION INTRAVENOUS at 11:29

## 2022-07-14 RX ADMIN — FENTANYL CITRATE 25 MCG: 50 INJECTION, SOLUTION INTRAMUSCULAR; INTRAVENOUS at 12:32

## 2022-07-14 RX ADMIN — ROPIVACAINE HYDROCHLORIDE 30 ML: 5 INJECTION, SOLUTION EPIDURAL; INFILTRATION; PERINEURAL at 10:09

## 2022-07-14 RX ADMIN — DEXAMETHASONE SODIUM PHOSPHATE 10 MG: 10 INJECTION, SOLUTION INTRAMUSCULAR; INTRAVENOUS at 11:00

## 2022-07-14 RX ADMIN — LIDOCAINE HYDROCHLORIDE 50 MG: 10 INJECTION, SOLUTION EPIDURAL; INFILTRATION; INTRACAUDAL; PERINEURAL at 10:54

## 2022-07-14 RX ADMIN — EPHEDRINE SULFATE 5 MG: 50 INJECTION INTRAVENOUS at 11:19

## 2022-07-14 NOTE — ANESTHESIA PREPROCEDURE EVALUATION
Procedure:  ARTHROPLASTY THUMB WEILBY (Right Finger)    Relevant Problems   CARDIO   (+) A-fib (HCC) (xarelto held appropriately)   (+) HTN (hypertension)   (+) Hyperlipidemia      GI/HEPATIC   (+) Gastroesophageal reflux disease without esophagitis      MUSCULOSKELETAL   (+) Arthritis   (+) Cervical spondylosis      NEURO/PSYCH   (+) Anxiety   (+) Chronic pain syndrome      PULMONARY   (+) MARY (obstructive sleep apnea) (CPAP machine malfunctioned a few months ago, no longer using)      Other   (+) CPAP (continuous positive airway pressure) dependence    BMI 33    Physical Exam    Airway  Comment: Thick neck, redundant tissue  Mallampati score: III  TM Distance: >3 FB  Neck ROM: full     Dental   No notable dental hx     Cardiovascular      Pulmonary      Other Findings       Lab Results   Component Value Date    WBC 4 0 06/20/2022    HGB 14 8 06/20/2022     06/20/2022     Lab Results   Component Value Date    SODIUM 137 06/20/2022    K 4 5 06/20/2022    BUN 16 06/20/2022    CREATININE 0 78 06/20/2022    EGFR 98 06/20/2022     Lab Results   Component Value Date    HGBA1C 5 2 09/01/2021     Anesthesia Plan  ASA Score- 3     Anesthesia Type- general with ASA Monitors  Additional Monitors:   Airway Plan: LMA  Comment: Discussed nerve block for post op pain control - pt and wife elect to proceed with this  Plan Factors-Exercise tolerance (METS): >4 METS  Chart reviewed  Existing labs reviewed  Patient summary reviewed  Patient is not a current smoker  Induction- intravenous  Postoperative Plan-     Informed Consent- Anesthetic plan and risks discussed with patient and spouse  I personally reviewed this patient with the CRNA  Discussed and agreed on the Anesthesia Plan with the CRNA  Stacey Camargo

## 2022-07-14 NOTE — H&P
H&P Exam - Orthopedics   Olive Leon  75 y o  male MRN: [id number]  Unit/Bed#: APU 06    Assessment/Plan   Assessment:  Right thumb CMC joint arthritis with MP hyper-extension     Plan:  Right thumb interpositional arthroplasty     History of Present Illness   HPI:  Olive Leon  is a 75 y o  male who presents with pain in the right thumb CMC joint  Patient is not getting relief from non-operative modalities at this time  Historical Information  Review Of Systems:   · Skin: Normal  · Neuro: See HPI  · Musculoskeletal: See HPI  · 14 point review of systems negative except as stated above     Past Medical History:   Past Medical History:   Diagnosis Date    Anxiety     Atrial fibrillation (HCC)     Colon polyp     CPAP (continuous positive airway pressure) dependence     Has not been using    GERD (gastroesophageal reflux disease)     Hypercholesterolemia     Hypertension     Sleep apnea     not using CPAP       Past Surgical History:   Past Surgical History:   Procedure Laterality Date    ABDOMINAL SURGERY      COLONOSCOPY      July 2019:  Sessile serrated adenoma  June 2014, hyperplastic polyps and internal hemorrhoids    ESOPHAGOGASTRODUODENOSCOPY      Irregular Z-line and gastritis biopsies negative for H pylori or Newton's    HYDROCELE EXCISION / REPAIR      UMBILICAL HERNIA REPAIR      UPPER GASTROINTESTINAL ENDOSCOPY      October 2020: Irregular Z-line and gastritis, biopsies negative for EOE, Newton's, H pylori     2008:  Negative for mass ulcer or Newton's    VOLVULUS REDUCTION  2015       Family History:  Family history reviewed and non-contributory  Family History   Problem Relation Age of Onset    Breast cancer Mother     Esophageal cancer Father     Cancer Father     Diabetes Family     Breast cancer Family     Esophageal cancer Family     Colon cancer Family     Colon cancer Brother        Social History:  Social History     Socioeconomic History    Marital status: /Civil [employer]     Spouse name: None    Number of children: None    Years of education: None    Highest education level: None   Occupational History    None   Tobacco Use    Smoking status: Never Smoker    Smokeless tobacco: Never Used   Vaping Use    Vaping Use: Never used   Substance and Sexual Activity    Alcohol use: Not Currently     Comment: 2-3 daily, red wine or scotch    Drug use: No    Sexual activity: None   Other Topics Concern    None   Social History Narrative    None     Social Determinants of Health     Financial Resource Strain: Not on file   Food Insecurity: Not on file   Transportation Needs: Not on file   Physical Activity: Not on file   Stress: Not on file   Social Connections: Not on file   Intimate Partner Violence: Not on file   Housing Stability: Not on file       Allergies:   No Known Allergies        Labs:  0   Lab Value Date/Time    HCT 43 4 06/20/2022 0750    HGB 14 8 06/20/2022 0750    WBC 4 0 06/20/2022 0750       Meds:    Current Facility-Administered Medications:     ceFAZolin (ANCEF) IVPB (premix in dextrose) 1,000 mg 50 mL, 1,000 mg, Intravenous, Once, [facility] [place]-    ceFAZolin (ANCEF) IVPB (premix in dextrose) 2,000 mg 50 mL, 2,000 mg, Intravenous, Once, [place], PA-    lactated ringers infusion, 125 mL/hr, Intravenous, Continuous, Jennifer Bolanos MD, Last Rate: 125 mL/hr at 07/14/22 0945, 125 mL/hr at 07/14/22 0945    lidocaine (PF) (XYLOCAINE-MPF) 1 % injection 0 5 mL, 0 5 mL, Infiltration, Once PRN, Jennifer Bolanos MD    Blood Culture:   No results found for: BLOODCX    Wound Culture:   No results found for: WOUNDCULT    Ins and Outs:  No intake/output data recorded              Physical Exam  /77   Pulse 58   Temp 98 °F (36 7 °C) (Oral)   Resp 18   Ht 6' 3" (1 905 m)   Wt 121 kg (267 lb 6 4 oz)   SpO2 96%   BMI 33 42 kg/m²   /77   Pulse 58   Temp 98 °F (36 7 °C) (Oral)   Resp 18   Ht 6' 3" (1 905 m)   Wt 121 kg (267 lb 6 4 oz)   SpO2 96%   BMI 33 42 kg/m²   Gen: Alert and oriented to person, place, time  HEENT: EOMI, eyes clear, moist mucus membranes, hearing intact  Respiratory: Bilateral chest rise  No audible wheezing found  Cardiovascular: Regular Rate and Rhythm  Abdomen: soft nontender/nondistended  Ortho Exam: tenderness CMC joint   MP joint hyper-extension   Neuro Exam: sensation intact    Lab Results: Reviewed  Imaging: Reviewed

## 2022-07-14 NOTE — OP NOTE
OPERATIVE REPORT  PATIENT NAME: Jennyfer Michelle  :  1955  MRN: [id number]  Pt Location:  MAIN OR    SURGERY DATE: 22    Surgeon(s) and Role:     * Eli Caal MD - Primary     * Roberta Escobedo MD - Assisting    Pre-Op Diagnosis:  Arthritis of carpometacarpal [place]) joint of right thumb [M18 11]    Post-Op Diagnosis:    Arthritis of carpometacarpal (CMC) joint of right thumb [M18 11]    Procedure(s) (LRB):  right thumb interpositional arthroplasty (Right)    Specimen(s):  * No orders in the log *    Estimated Blood Loss:   Minimal      Anesthesia Type:   General    Operative Indications: The patient has a history of Thumb CMC Arthritis  right that was recalcitrant to conservative management  The decision was made to bring the patient to the operating room for Right thumb interpositional arthroplasty  Risks of the procedure were explained which include, but are not limited to bleeding; infection; damage to nerves, arteries,veins, tendons; scar; pain; need for reoperation; failure to give desired result; and risks of anaesthesia  All questions were answered to satisfaction and they were willing to proceed  Operative Findings:  Right thumb CMC arthritis    Complications:   None    Procedure and Technique:  After the patient, site, and procedure were identified, the patient was brought into the operating room in a supine position  Regional and general anaesthesia were provided  A well padded tourniquet was applied to the extremity, set at 250 mmHg  The  right upper extremity was then prepped and drapped in a normal, sterile, orthopedic fashion  After the patient, site, and procedure were once again identified, attention was turned to the right thumb  A curvilinear incision was made at the junction of the glaborous and nonglaborous skin extending toward the flexor carpi radialis tendon    Care was taken to protect the superficial sensory branch of the radial nerve, the radial artery, the median nerve, the palmar cutaneous branch of the median nerve, the flexor carpi radialis tendon, the abductor pollicis longus tendon and the extensor pollicis brevis tendon  The thenar muscles were elevated off of the thumb metacarpal and an arthrotomy was done at the trapeziometacarpal and scaphotrapezial joint  The trapezium was removed in its entirety  Inspection revealed stage 4 (full thickiness loss of the articular cartilage with exposed subchondral bone) arthritis at the level of the metacarpal base, stage 4 (full thickiness loss of the articular cartilage with exposed subchondral bone) arthritis at the distal pole of the scaphoid, and inspection of the scaphotrapezoid joint revealed stage 3 (partial thickness loss of the articular cartilage) arthritis  At this point, the thumb was held in a reduced position  Utilizing a 2  FiberWire suture, the base of the flexor carpi radialis was sutured to the insertion of the adductor pollicis longus tendon securing the thumb in this reduced position  Multiple passes were then made  A longitudinal stress test was then performed demonstrating no evidence of subsidence or collapse  The thumb was sitting in a good resting position at the completion of multiple suture passes     Gelfoam was placed in the remainder of the hole  At the completion of the procedure, hemostasis was obtained with cautery and direct pressure  The wounds were copiously irrigated with sterile solution  The wounds were closed with Vicryl and Prolene  Sterile dressings were applied, including Xeroform, gauze, tweeners, webril, ACE and Thumb Spica Splint  Please note, all sponge, needle, and instrument counts were correct prior to closure  Loupe magnification was utilized  The patient tolerated the procedure well       I was present for all critical portions of the procedure    Patient Disposition:  PACU , hemodynamically stable and extubated and stable    SIGNATURE: Ochoa Cook Catrina Portillo MD  DATE: 07/14/22  TIME: 12:10 PM

## 2022-07-14 NOTE — ANESTHESIA PROCEDURE NOTES
Peripheral Block    Patient location during procedure: holding area  Start time: 7/14/2022 10:09 AM  Reason for block: at surgeon's request and post-op pain management  Staffing  Performed: Anesthesiologist   Anesthesiologist: Daisy James MD  Resident/CRNA: Hoa Mcdonnell CRNA  Preanesthetic Checklist  Completed: patient identified, IV checked, site marked, risks and benefits discussed, surgical consent, monitors and equipment checked, pre-op evaluation and timeout performed  Peripheral Block  Patient position: sitting  Prep: ChloraPrep  Patient monitoring: heart rate, continuous pulse ox and frequent blood pressure checks  Block type: supraclavicular  Laterality: right  Injection technique: single-shot  Procedures: ultrasound guided, Ultrasound guidance required for the procedure to increase accuracy and safety of medication placement and decrease risk of complications    Ultrasound permanent image savedropivacaine (NAROPIN) 0 5 % - Perineural   30 mL - 7/14/2022 10:09:00 AM  lidocaine (PF) (XYLOCAINE-MPF) 1 % - Infiltration   3 mL - 7/14/2022 10:09:00 AM  midazolam (VERSED) 2 mg/2 mL - Intravenous   2 mg - 7/14/2022 10:07:00 AM  Needle  Needle type: Stimuplex   Needle gauge: 22 G  Needle length: 10 cm  Needle localization: ultrasound guidance  Assessment  Injection assessment: incremental injection, local visualized surrounding nerve on ultrasound, negative aspiration for heme and no paresthesia on injection  Paresthesia pain: none  Heart rate change: no  Slow fractionated injection: yes  Post-procedure:  site cleaned  patient tolerated the procedure well with no immediate complications  Additional Notes  Wife present, pt comfortable and conversant throughout, no parasthesias, well visualized

## 2022-07-14 NOTE — ANESTHESIA POSTPROCEDURE EVALUATION
Post-Op Assessment Note    CV Status:  Stable  Pain Score: 0    Pain management: adequate     Mental Status:  Awake   Hydration Status:  Euvolemic   PONV Controlled:  None   Airway Patency:  Patent      Post Op Vitals Reviewed: Yes      Staff: Anesthesiologist, CRNA   Comments: report given to RN; VSS; 6L/min facemask for transport        No complications documented      BP (!) 179/81 (07/14/22 1230)    Temp 98 2 °F (36 8 °C) (07/14/22 1230)    Pulse 74 (07/14/22 1230)   Resp 12 (07/14/22 1230)    SpO2 98 % (07/14/22 1230)

## 2022-07-15 ENCOUNTER — TELEPHONE (OUTPATIENT)
Dept: OBGYN CLINIC | Facility: HOSPITAL | Age: 67
End: 2022-07-15

## 2022-07-15 NOTE — TELEPHONE ENCOUNTER
Patient is calling for clarification on what medications he should be taking and what medications he should not be taking    Transferred to the triage nurse at [phone]

## 2022-07-15 NOTE — TELEPHONE ENCOUNTER
Spoke to patient  He stated he is taking the norco as prescribed and using ice hourly and his wilton is still at an 8/10  Sometimes it goes down to 6/10 but not much  Advised patient he should be taking the tylenol and aleve as prescribed as well  Regimented with those medications and norco for pain beyond the nonnarcotic means of pain control  Ice 20 mins on 20 mins off  He may also adjust the bandage if it feels too tight  Stated he has adequate felling, temp, and color to the fingers currently  He did loosen the bandage slightly and stated that helped some  He stated he will try the tylenol and naproxen in addition to the other recommendations and call back if symptoms do not improve further

## 2022-07-15 NOTE — TELEPHONE ENCOUNTER
Spoke to patient again  He stated he is confused about his meds again because Romie Lema discharged him and told him not to take all of the meds together  Reviewed medications again and patient is also taking xarelto  Advised since he is on xarelto he should not take NSAIDs  Advised tylenol and norco are okay as prescribed only but avoid the aleve  Use ice 20 mins on 20 mins off and elevation for swelling  Patient verbalized understanding  He did state that he took tylneol with the norco on his last dose and his pain is now a 1-2/10 and totally manageable  Please be advised

## 2022-07-15 NOTE — TELEPHONE ENCOUNTER
Patient calling to state his pain level is a 8/10 even with current pain medication and would like something else for the pain        # [phone]

## 2022-07-25 ENCOUNTER — OFFICE VISIT (OUTPATIENT)
Dept: OBGYN CLINIC | Facility: CLINIC | Age: 67
End: 2022-07-25

## 2022-07-25 ENCOUNTER — OFFICE VISIT (OUTPATIENT)
Dept: OCCUPATIONAL THERAPY | Facility: CLINIC | Age: 67
End: 2022-07-25
Payer: COMMERCIAL

## 2022-07-25 VITALS
SYSTOLIC BLOOD PRESSURE: 130 MMHG | HEIGHT: 75 IN | DIASTOLIC BLOOD PRESSURE: 74 MMHG | WEIGHT: 271 LBS | BODY MASS INDEX: 33.69 KG/M2

## 2022-07-25 DIAGNOSIS — Z47.89 AFTERCARE FOLLOWING SURGERY OF THE MUSCULOSKELETAL SYSTEM: Primary | ICD-10-CM

## 2022-07-25 DIAGNOSIS — M19.90 ARTHRITIS: Primary | ICD-10-CM

## 2022-07-25 PROCEDURE — 99024 POSTOP FOLLOW-UP VISIT: CPT | Performed by: PHYSICIAN ASSISTANT

## 2022-07-25 PROCEDURE — L3933 FO W/O JOINTS CF: HCPCS | Performed by: OCCUPATIONAL THERAPIST

## 2022-07-25 NOTE — PROGRESS NOTES
Assessment:   S/P right thumb interpositional arthroplasty - Right on 7/14/2022    Plan:   Therapy  - MP blocking splint due to MP hyperextension    [place] Post op Protocol     Precautions Maintain webspace, no CMC Adduction x 6wks  -2 wks - Comfort Cool   Begin ROM, edema control, scar management , fine motor   -6wks - begin strengthening     OK to resume normal hygiene activities  - do not submerge     Follow Up:  6 weeks    To Do Next Visit:  Begin strengthening      CHIEF COMPLAINT:  Chief Complaint   Patient presents with    Right Thumb - Post-op, Suture / Staple Removal     S/P right thumb interpositional arthroplasty 7/14/22         SUBJECTIVE:  Blanca Carrion  is a 75 y o  male who presents for follow up after right thumb interpositional arthroplasty - Right on 7/14/2022  Today patient has no pain  He denies any numbness or tingling  He denies any signs of an infection  He denies any other acute complaints          PHYSICAL EXAMINATION:  Vital signs: There were no vitals taken for this visit    General: well developed and well nourished, alert, oriented times 3 and appears comfortable  Psychiatric: Normal    MUSCULOSKELETAL EXAMINATION:  Incision: Clean, dry, intact  Range of Motion: Kapandji score 6  Neurovascular status: Neuro intact, good cap refill  Activity Restrictions: Restrictions: motion ONLY, no strengthening  Done today: Sutures out and Steri strips applied      STUDIES REVIEWED:  No Studies to review      PROCEDURES PERFORMED:  Procedures  No Procedures performed today

## 2022-07-25 NOTE — PROGRESS NOTES
Orthosis    Diagnosis:   1  Arthritis       Indication: Motion Blocking    Location: Right  thumb  Supplies: Custom Fit Orthotic  Orthosis type: MCP block  Wearing Schedule: Remove with Protected Technique Only as Needed  Describe Position: MCP 30     Precautions: Universal (skin contact/breakdown)    Patient or Caregiver expresses understanding of wearing Schedule and Precautions? Yes  Patient or Caregiver able to don/doff orthotic independently? Yes    Written orders provided to patient?  Yes  Orders Obtained: Written  Orders Obtained from: Joel Whittington    Return for evaluation and treatment Yes

## 2022-07-26 ENCOUNTER — EVALUATION (OUTPATIENT)
Dept: OCCUPATIONAL THERAPY | Facility: CLINIC | Age: 67
End: 2022-07-26
Payer: COMMERCIAL

## 2022-07-26 DIAGNOSIS — M18.11 ARTHRITIS OF CARPOMETACARPAL (CMC) JOINT OF RIGHT THUMB: ICD-10-CM

## 2022-07-26 PROCEDURE — 97140 MANUAL THERAPY 1/> REGIONS: CPT | Performed by: OCCUPATIONAL THERAPIST

## 2022-07-26 PROCEDURE — 97165 OT EVAL LOW COMPLEX 30 MIN: CPT | Performed by: OCCUPATIONAL THERAPIST

## 2022-07-26 PROCEDURE — 97110 THERAPEUTIC EXERCISES: CPT | Performed by: OCCUPATIONAL THERAPIST

## 2022-07-26 NOTE — PROGRESS NOTES
OT Evaluation     Today's date: 2022  Patient name: Christie Ingram  : 1955  MRN: [id number]  Referring provider: ROD Sullivan*  Dx: [facility] TA             Assessment  Assessment details: Kae Brittle presents s/p R CMC TA on 22  He is wearing a neoprene splint with a MCP hyperextension block   He has mild pain   He has moderate edema   He has decreased wrist and thumb ROM   He is working   He is limited with gripping , lifting, bottles , jars   He is working as    He has help from his  Wife at home  He would like to be able to golf   Impairments: abnormal or restricted ROM, activity intolerance, impaired physical strength, lacks appropriate home exercise program and pain with function  Functional limitations: unable to pinch , open  bottles , jars , button  Symptom irritability: moderateUnderstanding of Dx/Px/POC: good   Prognosis: good    Goals  STG-1 wk  1- I with HEP   2- worst pain 5/10    LTG-10 wks  1- no pain with use L hand  2- I ADL - self care  3- L  and pinches 50% of R  4- resume golf     Plan  Patient would benefit from: OT eval and skilled occupational therapy  Planned modality interventions: cryotherapy and thermotherapy: hydrocollator packs  Planned therapy interventions: ADL retraining, manual therapy, massage, functional ROM exercises, therapeutic activities, therapeutic exercise, fine motor coordination training, coordination and home exercise program  Other planned therapy interventions: strengthen after 6 wks  Frequency: 2x week  Duration in weeks: 4  Plan of Care beginning date: 2022  Treatment plan discussed with: patient        Subjective Evaluation    History of Present Illness  Date of surgery: 2022  Mechanism of injury: R CMC TA   Fitted with MCP ext block 22    Quality of life: good    Pain  Current pain ratin  At best pain ratin  At worst pain rating: 3  Location: R CMC   Quality: dull ache  Relieving factors: support and rest  Progression: improved    Social Support  Steps to enter house: yes  Stairs in house: yes   Lives in: multiple-level home  Lives with: spouse    Employment status: working  Hand dominance: right    Treatments  Previous treatment: immobilization  Patient Goals  Patient goals for therapy: decreased edema, decreased pain, increased motion, independence with ADLs/IADLs, increased strength and return to sport/leisure activities          Objective     Observations     Additional Observation Details  Steri strips on scar volar R CMC     Palpation     Additional Palpation Details  Tender surgical site     Neurological Testing     Additional Neurological Details  Denies parasthesias     Active Range of Motion     Right Wrist   Wrist flexion: 40 degrees   Wrist extension: 30 degrees   Radial deviation: 12 degrees   Ulnar deviation: 20 degrees     Right Thumb   Flexion     MP: 0    DIP: 0  Extension     MP: 30    DIP: 60  Palmar Abduction    CMC: 50  Opposition: opp 5th tip     Additional Active Range of Motion Details  Digit ROM WNL     Strength/Myotome Testing     Left Wrist/Hand      (2nd hand position)     Trial 1: 45    Thumb Strength  Key/Lateral Pinch     Trial 1: 15  Palmar/Three-Point Pinch     Trial 1: 10    Tests     Left Wrist/Hand   Positive extrinsic flexor tightness  Right Wrist/Hand   Positive intrinsic muscle tightness  Swelling     Left Wrist/Hand   Circumference wrist: 20 cm    Right Wrist/Hand   Circumference wrist: 21 5 cm             Precautions: CMC TA 7/14/22- [place] Post op Protocol     Precautions Maintain webspace, no CMC Adduction x 6wks  -2 wks - Comfort Cool    Begin ROM, edema control, scar management , fine motor   -6wks - begin strengthening       Manuals 7/26            graston  2m            Scar massage              Retrograde massage  4m            Intrinsic stretches 4m            HEP  5x day Ther Ex             A/PROM R wrist  2m            AROM R thumb  2m            Wrist w/cone                                                                              Ther Activity             Pegs with alt opp 40x                         Gait Training                                       Modalities             MH  8m            Cp post  6m

## 2022-07-28 ENCOUNTER — OFFICE VISIT (OUTPATIENT)
Dept: OCCUPATIONAL THERAPY | Facility: CLINIC | Age: 67
End: 2022-07-28
Payer: COMMERCIAL

## 2022-07-28 DIAGNOSIS — M18.11 ARTHRITIS OF CARPOMETACARPAL (CMC) JOINT OF RIGHT THUMB: Primary | ICD-10-CM

## 2022-07-28 PROCEDURE — 97140 MANUAL THERAPY 1/> REGIONS: CPT | Performed by: OCCUPATIONAL THERAPIST

## 2022-07-28 PROCEDURE — 97110 THERAPEUTIC EXERCISES: CPT | Performed by: OCCUPATIONAL THERAPIST

## 2022-07-28 NOTE — PROGRESS NOTES
Daily Note     Today's date: 2022  Patient name: Cherylene Gros  : 1955  MRN: [id number]  Referring provider: ROD Pinedo*  Dx:   Encounter Diagnosis     ICD-10-CM    1  Arthritis of carpometacarpal (CMC) joint of right thumb  M18 11                   Subjective: the splint digs  A little        Objective: See treatment diary below      Assessment: Tolerated treatment well  Patient has improved ROM  Plan: Continue per plan of care  Precautions: CMC TA 22- [place] Post op Protocol     Precautions Maintain webspace, no CMC Adduction x 6wks  -2 wks - Comfort Cool    Begin ROM, edema control, scar management , fine motor   -6wks - begin strengthening       Manuals            graston  2m 2m           Scar massage              Retrograde massage  4m 4m           Intrinsic stretches 4m 4m           HEP  5x day                                                                                                        Ther Ex             A/PROM R wrist  2m 2m           AROM R thumb  2m 2m           Wrist w/cone  2m                                                                            Ther Activity             Pegs with alt opp 40x 40x                        Gait Training                                       Modalities             MH  8m 8m           Cp post  6m 6m

## 2022-08-03 ENCOUNTER — OFFICE VISIT (OUTPATIENT)
Dept: OCCUPATIONAL THERAPY | Facility: CLINIC | Age: 67
End: 2022-08-03
Payer: COMMERCIAL

## 2022-08-03 DIAGNOSIS — M19.90 ARTHRITIS: ICD-10-CM

## 2022-08-03 DIAGNOSIS — M18.11 ARTHRITIS OF CARPOMETACARPAL (CMC) JOINT OF RIGHT THUMB: Primary | ICD-10-CM

## 2022-08-03 PROCEDURE — 97110 THERAPEUTIC EXERCISES: CPT | Performed by: OCCUPATIONAL THERAPIST

## 2022-08-03 PROCEDURE — 97140 MANUAL THERAPY 1/> REGIONS: CPT | Performed by: OCCUPATIONAL THERAPIST

## 2022-08-03 NOTE — PROGRESS NOTES
Daily Note     Today's date: 8/3/2022  Patient name: Delmi Hall  : 1955  MRN: [id number]  Referring provider: ROD Arriola*  Dx:   Encounter Diagnosis     ICD-10-CM    1  Arthritis of carpometacarpal (CMC) joint of right thumb  M18 11    2  Arthritis  M19 90                   Subjective: I have been taping the scar  Objective: See treatment diary below      Assessment: Tolerated treatment well  Patient has improved ROM  Pt  Scar irritated by pt  Re taping the incision since steri strips fell off  Pt  Encouraged to air out the incision when at rest         Plan: Continue per plan of care  Precautions: CMC TA 22- [place] Post op Protocol     Precautions Maintain webspace, no CMC Adduction x 6wks  -2 wks - Comfort Cool    Begin ROM, edema control, scar management , fine motor   -6wks - begin strengthening       Manuals 7/26 7/28 8/3          graston  2m 2m 2m          Scar massage              Retrograde massage  4m 4m 4m          Intrinsic stretches 4m 4m 4m          HEP  5x day                                                                                                        Ther Ex             A/PROM R wrist  2m 2m 2m          AROM R thumb  2m 2m 2m          Wrist w/cone  2m 2m                                                                           Ther Activity             Pegs with alt opp 40x 40x x40                       Gait Training                                       Modalities             MH  8m 8m 8m          Cp post  6m 6m 6m

## 2022-08-15 ENCOUNTER — OFFICE VISIT (OUTPATIENT)
Dept: OCCUPATIONAL THERAPY | Facility: CLINIC | Age: 67
End: 2022-08-15
Payer: COMMERCIAL

## 2022-08-15 DIAGNOSIS — M18.11 ARTHRITIS OF CARPOMETACARPAL (CMC) JOINT OF RIGHT THUMB: Primary | ICD-10-CM

## 2022-08-15 PROCEDURE — 97140 MANUAL THERAPY 1/> REGIONS: CPT | Performed by: OCCUPATIONAL THERAPIST

## 2022-08-15 PROCEDURE — 97110 THERAPEUTIC EXERCISES: CPT | Performed by: OCCUPATIONAL THERAPIST

## 2022-08-15 NOTE — PROGRESS NOTES
Daily Note     Today's date: 8/15/2022  Patient name: Blanca Carrion  : 1955  MRN: [id number]  Referring provider: Virlinda Landau, PA*  Dx:   Encounter Diagnosis     ICD-10-CM    1  Arthritis of carpometacarpal (CMC) joint of right thumb  M18 11                   Subjective: I have better ROM        Objective: See treatment diary below      Assessment: Tolerated treatment well  Patient has improved thumb flexion      Plan: Continue per plan of care  Precautions: CMC TA 22- [place] Post op Protocol     Precautions Maintain webspace, no CMC Adduction x 6wks  -2 wks - Comfort Cool    Begin ROM, edema control, scar management , fine motor   -6wks - begin strengthening       Manuals 7/26 7/28 8/3 8/15         graston  2m 2m 2m 2m         Scar massage              Retrograde massage  4m 4m 4m 4m         Intrinsic stretches 4m 4m 4m 4m         HEP  5x day                                                                                                        Ther Ex             A/PROM R wrist  2m 2m 2m 2m         AROM R thumb  2m 2m 2m 2m         Wrist w/cone  2m 2m 2m                                                                          Ther Activity             Pegs with alt opp 40x 40x x40 x40                      Gait Training                                       Modalities             MH  8m 8m 8m 8m         Cp post  6m 6m 6m 6m

## 2022-08-23 ENCOUNTER — APPOINTMENT (OUTPATIENT)
Dept: OCCUPATIONAL THERAPY | Facility: CLINIC | Age: 67
End: 2022-08-23
Payer: COMMERCIAL

## 2022-08-29 ENCOUNTER — OFFICE VISIT (OUTPATIENT)
Dept: OCCUPATIONAL THERAPY | Facility: CLINIC | Age: 67
End: 2022-08-29
Payer: COMMERCIAL

## 2022-08-29 DIAGNOSIS — M18.11 ARTHRITIS OF CARPOMETACARPAL (CMC) JOINT OF RIGHT THUMB: Primary | ICD-10-CM

## 2022-08-29 DIAGNOSIS — M19.90 ARTHRITIS: ICD-10-CM

## 2022-08-29 PROCEDURE — 97110 THERAPEUTIC EXERCISES: CPT | Performed by: OCCUPATIONAL THERAPIST

## 2022-08-29 PROCEDURE — 97140 MANUAL THERAPY 1/> REGIONS: CPT | Performed by: OCCUPATIONAL THERAPIST

## 2022-08-29 NOTE — PROGRESS NOTES
Daily Note     Today's date: 2022  Patient name: eJssica Aguilera  : 1955  MRN: [id number]  Referring provider: ROD Dc*  Dx:   Encounter Diagnosis     ICD-10-CM    1  Arthritis of carpometacarpal (CMC) joint of right thumb  M18 11    2  Arthritis  M19 90                   Subjective: It's getting better      Objective: See treatment diary below      Assessment: Tolerated treatment well  Patient has improved ROM   Pain is improving   Good tolerance to light PRE   Plan: Continue per plan of care  Precautions: CMC TA 22- [place] Post op Protocol     Precautions Maintain webspace, no CMC Adduction x 6wks  -2 wks - Comfort Cool    Begin ROM, edema control, scar management , fine motor   -6wks - begin strengthening       Manuals 7/26 7/28 8/3 8/15 8/29        graston  2m 2m 2m 2m 2m        Scar massage              Retrograde massage  4m 4m 4m 4m 4m        Intrinsic stretches 4m 4m 4m 4m 4m        HEP  5x day                                                                                                        Ther Ex             A/PROM R wrist  2m 2m 2m 2m 2m        AROM R thumb  2m 2m 2m 2m 2m        Wrist w/cone  2m 2m 2m 2m        Wrist PRE- e/f/rd     1#  3x10        powerweb       Green  3x10                                               Ther Activity             Pegs with alt opp 40x 40x x40 x40 x40        Pinch pins      Yellow  3x10        Gait Training                                       Modalities             MH  8m 8m 8m 8m 8m        Cp post  6m 6m 6m 6m 6m

## 2022-09-01 ENCOUNTER — OFFICE VISIT (OUTPATIENT)
Dept: OCCUPATIONAL THERAPY | Facility: CLINIC | Age: 67
End: 2022-09-01
Payer: COMMERCIAL

## 2022-09-01 DIAGNOSIS — M19.90 ARTHRITIS: ICD-10-CM

## 2022-09-01 DIAGNOSIS — M18.11 ARTHRITIS OF CARPOMETACARPAL (CMC) JOINT OF RIGHT THUMB: Primary | ICD-10-CM

## 2022-09-01 PROCEDURE — 97140 MANUAL THERAPY 1/> REGIONS: CPT | Performed by: OCCUPATIONAL THERAPIST

## 2022-09-01 PROCEDURE — 97110 THERAPEUTIC EXERCISES: CPT | Performed by: OCCUPATIONAL THERAPIST

## 2022-09-01 NOTE — PROGRESS NOTES
Daily Note     Today's date: 2022  Patient name: Lori Alberto  : 1955  MRN: [id number]  Referring provider: ROD Epperson*  Dx:   Encounter Diagnosis     ICD-10-CM    1  Arthritis of carpometacarpal (CMC) joint of right thumb  M18 11    2  Arthritis  M19 90                   Subjective: I am doing better      Objective: See treatment diary below      Assessment: Tolerated treatment well  Patient has improved pain and ROM   he has tolerated upgrades to  strengthening    Pt may putt and chip plastic balls without divot in splint   Plan: Continue per plan of care  Precautions: CMC TA 22- [place] Post op Protocol     Precautions Maintain webspace, no CMC Adduction x 6wks  -2 wks - Comfort Cool    Begin ROM, edema control, scar management , fine motor   -6wks - begin strengthening       Manuals 7/26 7/28 8/3 8/15 8/29 9/1       graston  2m 2m 2m 2m 2m 2m       Scar massage              Retrograde massage  4m 4m 4m 4m 4m 4m       Intrinsic stretches 4m 4m 4m 4m 4m 4m       HEP  5x day                                                                                                        Ther Ex             A/PROM R wrist  2m 2m 2m 2m 2m 2m       AROM R thumb  2m 2m 2m 2m 2m 2m       Wrist w/cone  2m 2m 2m 2m        Wrist PRE- e/f/rd     1#  3x10 2#  3x10       powerweb       Green  3x10 Green  3x10                                              Ther Activity             Pegs with alt opp 40x 40x x40 x40 x40 x40        Pinch pins      Yellow  3x10 Red  3x10       Gait Training                                       Modalities             MH  8m 8m 8m 8m 8m 8m       Cp post  6m 6m 6m 6m 6m 6m

## 2022-09-06 ENCOUNTER — APPOINTMENT (OUTPATIENT)
Dept: OCCUPATIONAL THERAPY | Facility: CLINIC | Age: 67
End: 2022-09-06
Payer: COMMERCIAL

## 2022-09-08 ENCOUNTER — OFFICE VISIT (OUTPATIENT)
Dept: OCCUPATIONAL THERAPY | Facility: CLINIC | Age: 67
End: 2022-09-08
Payer: COMMERCIAL

## 2022-09-08 DIAGNOSIS — M18.11 ARTHRITIS OF CARPOMETACARPAL (CMC) JOINT OF RIGHT THUMB: ICD-10-CM

## 2022-09-08 DIAGNOSIS — M19.90 ARTHRITIS: Primary | ICD-10-CM

## 2022-09-08 PROCEDURE — 97140 MANUAL THERAPY 1/> REGIONS: CPT | Performed by: OCCUPATIONAL THERAPIST

## 2022-09-08 PROCEDURE — 97110 THERAPEUTIC EXERCISES: CPT | Performed by: OCCUPATIONAL THERAPIST

## 2022-09-08 NOTE — PROGRESS NOTES
Daily Note     Today's date: 2022  Patient name: Bret Manriquez  : 1955  MRN: [id number]  Referring provider: ROD Alvarez*  Dx:   Encounter Diagnosis     ICD-10-CM    1  Arthritis  M19 90    2  Arthritis of carpometacarpal (CMC) joint of right thumb  M18 11                   Subjective: I am doing better    Objective: See treatment diary below      Assessment: Tolerated treatment well  Patient has improved ROM  Plan: Continue per plan of care  Precautions: CMC TA 22- [place] Post op Protocol     Precautions Maintain webspace, no CMC Adduction x 6wks  -2 wks - Comfort Cool    Begin ROM, edema control, scar management , fine motor   -6wks - begin strengthening       Manuals 7/26 7/28 8/3 8/15 8/29 9/1 9/8      graston  2m 2m 2m 2m 2m 2m 2m      Scar massage              Retrograde massage  4m 4m 4m 4m 4m 4m 4m      Intrinsic stretches 4m 4m 4m 4m 4m 4m 4m      HEP  5x day                                                                                                        Ther Ex             A/PROM R wrist  2m 2m 2m 2m 2m 2m 2m      AROM R thumb  2m 2m 2m 2m 2m 2m 2m      Wrist w/cone  2m 2m 2m 2m        Wrist PRE- e/f/rd     1#  3x10 2#  3x10 2#  3x10      powerweb       Green  3x10 Green  3x10 Blue  3x10                                             Ther Activity             Pegs with alt opp 40x 40x x40 x40 x40 x40  x40       Pinch pins      Yellow  3x10 Red  3x10 Red  3x10      Grooved pegs       30x                                Modalities             MH  8m 8m 8m 8m 8m 8m 8m      Cp post  6m 6m 6m 6m 6m 6m 6m

## 2022-09-12 ENCOUNTER — OFFICE VISIT (OUTPATIENT)
Dept: OBGYN CLINIC | Facility: CLINIC | Age: 67
End: 2022-09-12

## 2022-09-12 ENCOUNTER — OFFICE VISIT (OUTPATIENT)
Dept: OCCUPATIONAL THERAPY | Facility: CLINIC | Age: 67
End: 2022-09-12
Payer: COMMERCIAL

## 2022-09-12 VITALS
WEIGHT: 271.2 LBS | HEIGHT: 75 IN | BODY MASS INDEX: 33.72 KG/M2 | DIASTOLIC BLOOD PRESSURE: 80 MMHG | SYSTOLIC BLOOD PRESSURE: 128 MMHG

## 2022-09-12 DIAGNOSIS — M18.12 ARTHRITIS OF CARPOMETACARPAL (CMC) JOINT OF LEFT THUMB: ICD-10-CM

## 2022-09-12 DIAGNOSIS — Z47.89 AFTERCARE FOLLOWING SURGERY OF THE MUSCULOSKELETAL SYSTEM: ICD-10-CM

## 2022-09-12 DIAGNOSIS — Z47.89 AFTERCARE FOLLOWING SURGERY OF THE MUSCULOSKELETAL SYSTEM: Primary | ICD-10-CM

## 2022-09-12 PROCEDURE — L3933 FO W/O JOINTS CF: HCPCS | Performed by: OCCUPATIONAL THERAPIST

## 2022-09-12 PROCEDURE — 99024 POSTOP FOLLOW-UP VISIT: CPT | Performed by: ORTHOPAEDIC SURGERY

## 2022-09-12 NOTE — PROGRESS NOTES
Orthosis    Diagnosis:   1  Aftercare following surgery of the musculoskeletal system  Ambulatory Referral to Occupational Therapy     Indication: Arthritic Condition    Location: Right  thumb  Supplies: Custom Fit Orthotic  Orthosis type: MCP dorsal block  Wearing Schedule: With Activity Only  Describe Position: MCP in flex    Precautions: Universal (skin contact/breakdown)    Patient or Caregiver expresses understanding of wearing Schedule and Precautions? Yes  Patient or Caregiver able to don/doff orthotic independently? Yes    Written orders provided to patient?  Yes  Orders Obtained: Written  Orders Obtained from: Dr Sergio Yañez    Return for evaluation and treatment Yes

## 2022-09-12 NOTE — PROGRESS NOTES
Assessment:   S/P right thumb interpositional arthroplasty - Right on 7/14/2022  Left thumb CMC osteoarthritis    Plan:   Patient may discontinue the Comfort Cool brace at this time    Splint for a custom made hand base dorsal MP blocking splint to be made by occupational therapy was provided to the patient     Patient would like to proceed with a left thumb interpositional arthroplasty with pinning of metacarpophalangeal joint  Follow Up:  6  week(s)    To Do Next Visit:    Reassess current condition    Operative Discussions:  Thumb CMC Interpositional Arthroplasty:   The anatomy and physiology of carpometacarpal joint arthritis was discussed with the patient today in the office  Deterioration of the articular cartilage eventually leads to hypermobility at the thumb [facility] joint, resulting in joint subluxation, osteophyte formation, cystic changes within the trapezium and base of the first metacarpal, as well as subchondral sclerosis  Eventually, pain, limited mobility, and compensatory hyperextension at the metacarpophalangeal joint may develop  While normal activity and usage of the thumb joint may provide a painful experience to the patient, this typically does not result in damage to the thumb or hand  Treatment options include resting thumb spica splints to decreased joint edema, pain, and inflammation  Therapy exercises to strengthen the thenar musculature may relieve pain, but do not alter the overall continued development of osteoarthritis  Oral medications, topical medications, corticosteroid injections may decrease pain and increase overall function  Eventually, approximately 5% of patients may require surgical intervention  The risks and benefit of surgery were discussed with the patient  The patient is aware of the incision, the possible harvesting of the flexor carpi radialis tendon, as well as the interposition portion of the procedure    After the procedure, the pain will be decreased, the function improved, and the strength improved  The potential for numbness around the incision site was also discussed  The patient is aware that in the postoperative period, a bulky dressing will be worn for roughly 10 days, followed by a removable comfort cool device for 4 additional weeks  This comfort cool may be removed for showering, bathing, and daily therapy exercises  At 6 weeks postoperative, strengthening will continue for the next 6-8 weeks  The patient is aware that the average total recovery time is approximately 3-4 months  Success rate is approximately 90-95%  The risks and benefits of the procedure were explained to the patient, which include, but are not limited to: Bleeding, infection, recurrence, pain, scar, damage to tendons, damage to nerves, and damage to blood vessels, failure to give desired results and complications related to anesthesia  These risks, along with alternative conservative treatment options, and postoperative protocols were voiced back and understood by the patient  All questions were answered to the patient's satisfaction  The patient agrees to comply with a standard postoperative protocol, and is willing to proceed  Education was provided via written and auditory forms  There were no barriers to learning  Written handouts regarding wound care, incision and scar care, and general preoperative information was provided to the patient  Prior to surgery, the patient may be requested to stop all anti-inflammatory medications  Prophylactic aspirin, Plavix, and Coumadin may be allowed to be continued  Medications including vitamin E , ginkgo, and fish oil are requested to be stopped approximately one week prior to surgery  Hypertensive medications and beta blockers, if taken, should be continued  Reviewed with patient the risks and benefits of operative intervention  Reviewed operative and post operative course   Reviewed with patient expected recovery time (with or without MP joint pinning)  Patient would like to proceed with surgery       CHIEF COMPLAINT:  Chief Complaint   Patient presents with    Right Thumb - Post-op     right thumb interpositional arthroplasty- 7/14/22         SUBJECTIVE:  Farideh Augustin  is a 75 y o  male who presents for follow up after right thumb interpositional arthroplasty - Right on 7/14/2022  Today patient has stiffness  He states that he has been compliant with attending formal occupational therapy and notes improvements  He has been compliant with the use of Comfort Cool brace  He states that he has noticed increased pain about his left thumb and would like to proceed with surgical intervention        PHYSICAL EXAMINATION:  Vital signs: /80   Ht 6' 3" (1 905 m)   Wt 123 kg (271 lb 3 2 oz)   BMI 33 90 kg/m²   General: well developed and well nourished, alert, oriented times 3 and appears comfortable  Psychiatric: Normal    MUSCULOSKELETAL EXAMINATION:  Incision: Clean, dry, intact  Range of Motion: As expected and Limited due to stiffness  Neurovascular status: Neuro intact, good cap refill  Activity Restrictions: Restrictions: No heavy lifting      Left Thumb  Positive tenderness CMC  70 degrees hyperflexion at MCP joint  Crepitus  Positive shoulder sign    STUDIES REVIEWED:  No Studies to review      PROCEDURES PERFORMED:  Procedures  No Procedures performed today    Scribe Attestation    I,:  Makayla Salvador am acting as a scribe while in the presence of the attending physician :       I,:  Ml Lanier MD personally performed the services described in this documentation    as scribed in my presence :         Scribe Attestation    I,:  Makayla Salvador am acting as a scribe while in the presence of the attending physician :       I,:  Ml Lanier MD personally performed the services described in this documentation    as scribed in my presence :

## 2022-09-15 ENCOUNTER — OFFICE VISIT (OUTPATIENT)
Dept: OCCUPATIONAL THERAPY | Facility: CLINIC | Age: 67
End: 2022-09-15
Payer: COMMERCIAL

## 2022-09-15 DIAGNOSIS — Z47.89 AFTERCARE FOLLOWING SURGERY OF THE MUSCULOSKELETAL SYSTEM: Primary | ICD-10-CM

## 2022-09-15 DIAGNOSIS — M19.90 ARTHRITIS: ICD-10-CM

## 2022-09-15 PROCEDURE — 97110 THERAPEUTIC EXERCISES: CPT | Performed by: OCCUPATIONAL THERAPIST

## 2022-09-15 PROCEDURE — 97140 MANUAL THERAPY 1/> REGIONS: CPT | Performed by: OCCUPATIONAL THERAPIST

## 2022-09-15 NOTE — PROGRESS NOTES
Daily Note     Today's date: 9/15/2022  Patient name: Bret Manriquez  : 1955  MRN: [id number]  Referring provider: ROD Alvarez*  Dx:   Encounter Diagnosis     ICD-10-CM    1  Aftercare following surgery of the musculoskeletal system  Z47 89    2  Arthritis  M19 90                   Subjective: the splint moves around       Objective: See treatment diary below      Assessment: Tolerated treatment well  Patient has improved use R hand  Splint adjusted       Plan: Continue per plan of care  Precautions: CMC TA 22- [facility] Post op Protocol     Precautions Maintain webspace, no CMC Adduction x 6wks  -2 wks - Comfort Cool    Begin ROM, edema control, scar management , fine motor   -6wks - begin strengthening       Manuals 7/26 7/28 8/3 8/15 8/29 9/1 9/8 9/15     graston  2m 2m 2m 2m 2m 2m 2m 2m     Scar massage              Retrograde massage  4m 4m 4m 4m 4m 4m 4m 4m     Intrinsic stretches 4m 4m 4m 4m 4m 4m 4m 4m     HEP  5x day                                                                                                        Ther Ex             A/PROM R wrist  2m 2m 2m 2m 2m 2m 2m 2m     AROM R thumb  2m 2m 2m 2m 2m 2m 2m 2m     Wrist w/cone  2m 2m 2m 2m        Wrist PRE- e/f/rd     1#  3x10 2#  3x10 2#  3x10 3#  3x10     powerweb       Green  3x10 Green  3x10 Blue  3x10 Blue  4x10     flexbar P/S        Red  4x10                               Ther Activity             Pegs with alt opp 40x 40x x40 x40 x40 x40  x40  x40     Pinch pins      Yellow  3x10 Red  3x10 Red  3x10 Red  3x10     Grooved pegs       30x 30x                               Modalities             MH  8m 8m 8m 8m 8m 8m 8m 8m     Cp post  6m 6m 6m 6m 6m 6m 6m 6m

## 2022-09-19 ENCOUNTER — OFFICE VISIT (OUTPATIENT)
Dept: OCCUPATIONAL THERAPY | Facility: CLINIC | Age: 67
End: 2022-09-19
Payer: COMMERCIAL

## 2022-09-19 DIAGNOSIS — Z47.89 AFTERCARE FOLLOWING SURGERY OF THE MUSCULOSKELETAL SYSTEM: Primary | ICD-10-CM

## 2022-09-19 DIAGNOSIS — M19.90 ARTHRITIS: ICD-10-CM

## 2022-09-19 PROCEDURE — 97140 MANUAL THERAPY 1/> REGIONS: CPT | Performed by: OCCUPATIONAL THERAPIST

## 2022-09-19 PROCEDURE — 97110 THERAPEUTIC EXERCISES: CPT | Performed by: OCCUPATIONAL THERAPIST

## 2022-09-19 NOTE — PROGRESS NOTES
Daily Note     Today's date: 2022  Patient name: Irene Kincaid  : 1955  MRN: [id number]  Referring provider: ROD Gomez*  Dx:   Encounter Diagnosis     ICD-10-CM    1  Aftercare following surgery of the musculoskeletal system  Z47 89    2  Arthritis  M19 90                   Subjective: I am doing ok       Objective: See treatment diary below      Assessment: Tolerated treatment well  Patient improved use of R hand   Plan: Continue per plan of care  Precautions: CMC TA 22- [facility] Post op Protocol     Precautions Maintain webspace, no CMC Adduction x 6wks  -2 wks - Comfort Cool    Begin ROM, edema control, scar management , fine motor   -6wks - begin strengthening       Manuals 7/26 7/28 8/3 8/15 8/29 9/1 9/8 9/15 9/19    graston  2m 2m 2m 2m 2m 2m 2m 2m 2m    Scar massage              Retrograde massage  4m 4m 4m 4m 4m 4m 4m 4m 4m    Intrinsic stretches 4m 4m 4m 4m 4m 4m 4m 4m 4m    HEP  5x day                                                                                                        Ther Ex             A/PROM R wrist  2m 2m 2m 2m 2m 2m 2m 2m 2m    AROM R thumb  2m 2m 2m 2m 2m 2m 2m 2m 2m    Wrist w/cone  2m 2m 2m 2m        Wrist PRE- e/f/rd     1#  3x10 2#  3x10 2#  3x10 3#  3x10 3#  3x10    powerweb       Green  3x10 Green  3x10 Blue  3x10 Blue  4x10 Blue  4x10    flexbar P/S        Red  4x10 Red  4x10                              Ther Activity             Pegs with alt opp 40x 40x x40 x40 x40 x40  x40  x40 x40    Pinch pins      Yellow  3x10 Red  3x10 Red  3x10 Red  3x10 Red  3x10    Grooved pegs       30x 30x 30x                              Modalities             MH  8m 8m 8m 8m 8m 8m 8m 8m 8m    Cp post  6m 6m 6m 6m 6m 6m 6m 6m 6m

## 2022-09-22 ENCOUNTER — OFFICE VISIT (OUTPATIENT)
Dept: OCCUPATIONAL THERAPY | Facility: CLINIC | Age: 67
End: 2022-09-22
Payer: COMMERCIAL

## 2022-09-22 DIAGNOSIS — Z47.89 AFTERCARE FOLLOWING SURGERY OF THE MUSCULOSKELETAL SYSTEM: Primary | ICD-10-CM

## 2022-09-22 PROCEDURE — 97140 MANUAL THERAPY 1/> REGIONS: CPT | Performed by: OCCUPATIONAL THERAPIST

## 2022-09-22 PROCEDURE — 97110 THERAPEUTIC EXERCISES: CPT | Performed by: OCCUPATIONAL THERAPIST

## 2022-09-22 NOTE — PROGRESS NOTES
Daily Note     Today's date: 2022  Patient name: Blanca Carrion  : 1955  MRN: [id number]  Referring provider: Virlinda Landau, PA*  Dx:   Encounter Diagnosis     ICD-10-CM    1  Aftercare following surgery of the musculoskeletal system  Z47 89                   Subjective: I am doing good       Objective: See treatment diary below      Assessment:          Assessment  Assessment details: Glenetta Fleischer presents s/p R CMC TA on 22  He is wearing a neoprene splint with a MCP hyperextension block   He has mild pain   He has moderate edema   He has decreased wrist and thumb ROM   He is working   He is limited with gripping , lifting, bottles , jars   He is working as    He has help from his  Wife at home  He would like to be able to golf   Impairments: abnormal or restricted ROM, activity intolerance, impaired physical strength, lacks appropriate home exercise program and pain with function  Functional limitations: unable to pinch , open  bottles , jars , button  Symptom irritability: moderateUnderstanding of Dx/Px/POC: good   Prognosis: good    Goals  STG-1 wk  1- I with HEP   2- worst pain 5/10    LTG-10 wks  1- no pain with use L hand  2- I ADL - self care  3- L  and pinches 50% of R  4- resume golf     Plan  Patient would benefit from: O skilled occupational therapy  Planned modality interventions: cryotherapy and thermotherapy: hydrocollator packs  Planned therapy interventions: ADL retraining, manual therapy, massage, functional ROM exercises, therapeutic activities, therapeutic exercise, fine motor coordination training, coordination and home exercise program  Other planned therapy interventions: strengthen after 6 wks  Frequency: 2x week  Duration in weeks: 4  Plan of Care beginning date: 2022  Treatment plan discussed with: patient        Subjective Evaluation    History of Present Illness  Date of surgery: 2022  Mechanism of injury: R CMC TA    Fitted with MCP ext block 22  Quality of life: good    Pain  Current pain ratin  At best pain ratin  At worst pain ratin  Location: R CMC   Quality: dull ache  Relieving factors: support and rest  Progression: improved    Social Support  Steps to enter house: yes  Stairs in house: yes   Lives in: multiple-level home  Lives with: spouse    Employment status: working  Hand dominance: right    Treatments  Previous treatment: immobilization  Patient Goals  Patient goals for therapy: decreased edema, decreased pain, increased motion, independence with ADLs/IADLs, increased strength and return to sport/leisure activities          Objective     Observations     Additional Observation Details  Steri strips on scar volar R CMC     Palpation     Additional Palpation Details  Tender surgical site     Neurological Testing     Additional Neurological Details  Denies parasthesias     Active Range of Motion     Right Wrist   Wrist flexion: 60 previous 40 degrees   Wrist extension:55 previous  30 degrees   Radial deviation: 12 degrees   Ulnar deviation:25 previous  20 degrees     Right Thumb   Flexion     MP: 0    DIP: 0  Extension     MP: 30    DIP: 60  Palmar Abduction    CMC: 50  Opposition: opp 5th base   Additional Active Range of Motion Details  Digit ROM WNL    hyper ext MCP - using blocking splint    Strength/Myotome Testing           (2nd hand position)   R/L= 20/45    Thumb Strength  Key/Lateral Pinch    R/L= 10/ 15  Palmar/Three-Point Pinch    R/L= 8/10    Tests     Left Wrist/Hand   Positive extrinsic flexor tightness  Right Wrist/Hand     intrinsic muscle tightness resolved      Swelling     Left Wrist/Hand   Circumference wrist: 20 cm    Right Wrist/Hand   Circumference wrist: 21 5 cm        Plan: Continue per plan of care  Precautions: CMC TA 22- [facility] Post op Protocol     Precautions Maintain webspace, no CMC Adduction x 6wks  -2 wks - Comfort Cool    Begin ROM, edema control, scar management , fine motor   -6wks - begin strengthening       Manuals 7/26 7/28 8/3 8/15 8/29 9/1 9/8 9/15 9/19 9/22   graston  2m 2m 2m 2m 2m 2m 2m 2m 2m    Scar massage              Retrograde massage  4m 4m 4m 4m 4m 4m 4m 4m 4m    Intrinsic stretches 4m 4m 4m 4m 4m 4m 4m 4m 4m    HEP  5x day                                                                                                        Ther Ex             A/PROM R wrist  2m 2m 2m 2m 2m 2m 2m 2m 2m    AROM R thumb  2m 2m 2m 2m 2m 2m 2m 2m 2m    Wrist w/cone  2m 2m 2m 2m        Wrist PRE- e/f/rd     1#  3x10 2#  3x10 2#  3x10 3#  3x10 3#  3x10    powerweb       Green  3x10 Green  3x10 Blue  3x10 Blue  4x10 Blue  4x10    flexbar P/S        Red  4x10 Red  4x10                              Ther Activity             Pegs with alt opp 40x 40x x40 x40 x40 x40  x40  x40 x40    Pinch pins      Yellow  3x10 Red  3x10 Red  3x10 Red  3x10 Red  3x10    Grooved pegs       30x 30x 30x                              Modalities             MH  8m 8m 8m 8m 8m 8m 8m 8m 8m    Cp post  6m 6m 6m 6m 6m 6m 6m 6m 6m

## 2022-09-26 ENCOUNTER — OFFICE VISIT (OUTPATIENT)
Dept: OCCUPATIONAL THERAPY | Facility: CLINIC | Age: 67
End: 2022-09-26
Payer: COMMERCIAL

## 2022-09-26 DIAGNOSIS — M19.90 ARTHRITIS: ICD-10-CM

## 2022-09-26 DIAGNOSIS — Z47.89 AFTERCARE FOLLOWING SURGERY OF THE MUSCULOSKELETAL SYSTEM: Primary | ICD-10-CM

## 2022-09-26 PROCEDURE — 97140 MANUAL THERAPY 1/> REGIONS: CPT | Performed by: OCCUPATIONAL THERAPIST

## 2022-09-26 PROCEDURE — 97110 THERAPEUTIC EXERCISES: CPT | Performed by: OCCUPATIONAL THERAPIST

## 2022-09-26 NOTE — PROGRESS NOTES
Daily Note     Today's date: 2022  Patient name: Gayle Maria  : 1955  MRN: [id number]  Referring provider: ROD Nance*  Dx:   Encounter Diagnosis     ICD-10-CM    1  Aftercare following surgery of the musculoskeletal system  Z47 89    2  Arthritis  M19 90                   Subjective: I am still week      Objective: See treatment diary below      Assessment: Tolerated treatment well  Patient has improved strength   No pain       Plan: Continue per plan of care  Precautions: CMC TA 22- [facility] Post op Protocol     Precautions Maintain webspace, no CMC Adduction x 6wks  -2 wks - Comfort Cool    Begin ROM, edema control, scar management , fine motor   -6wks - begin strengthening       Manuals 9/26 7/28 8/3 8/15 8/29 9/1 9/8 9/15 9/19 9/22   graston  2m 2m 2m 2m 2m 2m 2m 2m 2m 2m   Scar massage              Retrograde massage  4m 4m 4m 4m 4m 4m 4m 4m 4m 4m   Intrinsic stretches 4m 4m 4m 4m 4m 4m 4m 4m 4m 4m   HEP  5x day                                                                                                        Ther Ex             A/PROM R wrist  2m 2m 2m 2m 2m 2m 2m 2m 2m 2m   AROM R thumb  2m 2m 2m 2m 2m 2m 2m 2m 2m 2m   Wrist w/cone  2m 2m 2m 2m        Wrist PRE- e/f/rd 3#  3x10    1#  3x10 2#  3x10 2#  3x10 3#  3x10 3#  3x10 3#  3x10   powerweb   Blue  4x10    Green  3x10 Green  3x10 Blue  3x10 Blue  4x10 Blue  4x10 Blue  4x10   flexbar P/S Red  4x10       Red  4x10 Red  4x10 Red  4x10                             Ther Activity             Pegs with alt opp 40x 40x x40 x40 x40 x40  x40  x40 x40 40x   Pinch pins  Red  4 sets    Yellow  3x10 Red  3x10 Red  3x10 Red  3x10 Red  3x10 Red  3x10   Grooved pegs       30x 30x 30x 30x   fxnal grasp  2 2# ball  3x10                         Modalities             MH  8m 8m 8m 8m 8m 8m 8m 8m 8m    Cp post  6m 6m 6m 6m 6m 6m 6m 6m 6m

## 2022-09-29 ENCOUNTER — OFFICE VISIT (OUTPATIENT)
Dept: OCCUPATIONAL THERAPY | Facility: CLINIC | Age: 67
End: 2022-09-29
Payer: COMMERCIAL

## 2022-09-29 DIAGNOSIS — M19.90 ARTHRITIS: ICD-10-CM

## 2022-09-29 DIAGNOSIS — Z47.89 AFTERCARE FOLLOWING SURGERY OF THE MUSCULOSKELETAL SYSTEM: Primary | ICD-10-CM

## 2022-09-29 PROCEDURE — 97140 MANUAL THERAPY 1/> REGIONS: CPT | Performed by: OCCUPATIONAL THERAPIST

## 2022-09-29 PROCEDURE — 97110 THERAPEUTIC EXERCISES: CPT | Performed by: OCCUPATIONAL THERAPIST

## 2022-09-29 NOTE — PROGRESS NOTES
Daily Note     Today's date: 2022  Patient name: Gayle Maria  : 1955  MRN: [id number]  Referring provider: ROD Nance*  Dx:   Encounter Diagnosis     ICD-10-CM    1  Aftercare following surgery of the musculoskeletal system  Z47 89    2  Arthritis  M19 90                   Subjective: I am doing better  I went back to lifting   Objective: See treatment diary below      Assessment: Tolerated treatment well  Patient has improved use of  R hand for fitness program         Plan: Continue per plan of care  Precautions: CMC TA 22- [facility] Post op Protocol     Precautions Maintain webspace, no CMC Adduction x 6wks  -2 wks - Comfort Cool    Begin ROM, edema control, scar management , fine motor   -6wks - begin strengthening       Manuals 9/26 9/29  8/3 8/15 8/29 9/1 9/8 9/15 9/19 9/22   graston  2m 2m 2m 2m 2m 2m 2m 2m 2m 2m   Scar massage              Retrograde massage  4m 4m 4m 4m 4m 4m 4m 4m 4m 4m   Intrinsic stretches 4m 4m 4m 4m 4m 4m 4m 4m 4m 4m   HEP  5x day                                                                                                        Ther Ex             A/PROM R wrist  2m 2m 2m 2m 2m 2m 2m 2m 2m 2m   AROM R thumb  2m 2m 2m 2m 2m 2m 2m 2m 2m 2m   Wrist w/cone  2m 2m 2m 2m        Wrist PRE- e/f/rd 3#  3x10 3#  3x10   1#  3x10 2#  3x10 2#  3x10 3#  3x10 3#  3x10 3#  3x10   powerweb   Blue  4x10 Black  4x10   Green  3x10 Green  3x10 Blue  3x10 Blue  4x10 Blue  4x10 Blue  4x10   flexbar P/S Red  4x10 Red  4x10      Red  4x10 Red  4x10 Red  4x10                             Ther Activity             Pegs with alt opp 40x 40x x40 x40 x40 x40  x40  x40 x40 40x   Pinch pins  Red  4 sets Green  4 sets    Yellow  3x10 Red  3x10 Red  3x10 Red  3x10 Red  3x10 Red  3x10   Grooved pegs       30x 30x 30x 30x   fxnal grasp  2 2# ball  3x10 2 2#  Ball  3x10                        Modalities             MH  8m 8m 8m 8m 8m 8m 8m 8m 8m    Cp post  6m 6m 6m 6m 6m 6m 6m 6m 6m

## 2022-10-03 ENCOUNTER — APPOINTMENT (OUTPATIENT)
Dept: OCCUPATIONAL THERAPY | Facility: CLINIC | Age: 67
End: 2022-10-03

## 2022-10-06 ENCOUNTER — OFFICE VISIT (OUTPATIENT)
Dept: OCCUPATIONAL THERAPY | Facility: CLINIC | Age: 67
End: 2022-10-06
Payer: COMMERCIAL

## 2022-10-06 DIAGNOSIS — Z47.89 AFTERCARE FOLLOWING SURGERY OF THE MUSCULOSKELETAL SYSTEM: Primary | ICD-10-CM

## 2022-10-06 DIAGNOSIS — M19.90 ARTHRITIS: ICD-10-CM

## 2022-10-06 PROCEDURE — 97140 MANUAL THERAPY 1/> REGIONS: CPT | Performed by: OCCUPATIONAL THERAPIST

## 2022-10-06 PROCEDURE — 97110 THERAPEUTIC EXERCISES: CPT | Performed by: OCCUPATIONAL THERAPIST

## 2022-10-06 NOTE — PROGRESS NOTES
Daily Note     Today's date: 10/6/2022  Patient name: Brittney Poe  : 1955  MRN: [id number]  Referring provider: ROD Mosley*  Dx:   Encounter Diagnosis     ICD-10-CM    1  Aftercare following surgery of the musculoskeletal system  Z47 89    2  Arthritis  M19 90                   Subjective: I doing great       Objective: See treatment diary below      Assessment: Tolerated treatment well  Patient has improved use of R hand for ADL and recreation  Plan: Continue per plan of care  Precautions: CMC TA 22- [facility] Post op Protocol     Precautions Maintain webspace, no CMC Adduction x 6wks  -2 wks - Comfort Cool    Begin ROM, edema control, scar management , fine motor   -6wks - begin strengthening        Manuals 9/26 9/29  10/6 8/15 8/29 9/1 9/8 9/15 9/19 9/22   graston  2m 2m 2m 2m 2m 2m 2m 2m 2m 2m   Scar massage              Retrograde massage  4m 4m 4m 4m 4m 4m 4m 4m 4m 4m   Intrinsic stretches 4m 4m 4m 4m 4m 4m 4m 4m 4m 4m   HEP  5x day                                                                                                        Ther Ex             A/PROM R wrist  2m 2m 2m 2m 2m 2m 2m 2m 2m 2m   AROM R thumb  2m 2m 2m 2m 2m 2m 2m 2m 2m 2m   Wrist w/cone  2m 2m 2m 2m        Wrist PRE- e/f/rd 3#  3x10 3#  3x10 3#  3x10  1#  3x10 2#  3x10 2#  3x10 3#  3x10 3#  3x10 3#  3x10   powerweb   Blue  4x10 Black  4x10 Black  4x10  Green  3x10 Green  3x10 Blue  3x10 Blue  4x10 Blue  4x10 Blue  4x10   flexbar P/S Red  4x10 Red  4x10 Green  4x10     Red  4x10 Red  4x10 Red  4x10                             Ther Activity             Pegs with alt opp 40x 40x x40 x40 x40 x40  x40  x40 x40 40x   Pinch pins  Red  4 sets Green  4 sets  Green4 sets   Yellow  3x10 Red  3x10 Red  3x10 Red  3x10 Red  3x10 Red  3x10   Grooved pegs       30x 30x 30x 30x   fxnal grasp  2 2# ball  3x10 2 2#  Ball  3x10 2 2#  Ball  3x10                       Modalities             MH  8m 8m 8m 8m 8m 8m 8m 8m 8m    Cp post  6m 6m 6m 6m 6m 6m 6m 6m 6m

## 2022-10-10 ENCOUNTER — APPOINTMENT (OUTPATIENT)
Dept: OCCUPATIONAL THERAPY | Facility: CLINIC | Age: 67
End: 2022-10-10

## 2022-10-12 PROBLEM — A41.9 SEPSIS (HCC): Status: RESOLVED | Noted: 2021-09-01 | Resolved: 2022-10-12

## 2022-10-13 ENCOUNTER — OFFICE VISIT (OUTPATIENT)
Dept: OCCUPATIONAL THERAPY | Facility: CLINIC | Age: 67
End: 2022-10-13
Payer: COMMERCIAL

## 2022-10-13 DIAGNOSIS — Z47.89 AFTERCARE FOLLOWING SURGERY OF THE MUSCULOSKELETAL SYSTEM: Primary | ICD-10-CM

## 2022-10-13 DIAGNOSIS — M19.90 ARTHRITIS: ICD-10-CM

## 2022-10-13 PROCEDURE — 97140 MANUAL THERAPY 1/> REGIONS: CPT | Performed by: OCCUPATIONAL THERAPIST

## 2022-10-13 PROCEDURE — 97110 THERAPEUTIC EXERCISES: CPT | Performed by: OCCUPATIONAL THERAPIST

## 2022-10-13 NOTE — PROGRESS NOTES
Daily Note     Today's date: 10/13/2022  Patient name: Maranda Childs  : 1955  MRN: [id number]  Referring provider: ROD Conte*  Dx:   Encounter Diagnosis     ICD-10-CM    1  Aftercare following surgery of the musculoskeletal system  Z47 89    2  Arthritis  M19 90                   Subjective: I am fantastic       Objective: See treatment diary below      Assessment: Tolerated treatment well  Patient has improved ROM   Plan: Continue per plan of care  Precautions: CMC TA 22- [facility] Post op Protocol     Precautions Maintain webspace, no CMC Adduction x 6wks  -2 wks - Comfort Cool    Begin ROM, edema control, scar management , fine motor   -6wks - begin strengthening        Manuals 9/26 9/29  10/6 10/13 8/29 9/1 9/8 9/15 9/19 9/22   graston  2m 2m 2m 2m 2m 2m 2m 2m 2m 2m   Scar massage              Retrograde massage  4m 4m 4m 4m 4m 4m 4m 4m 4m 4m   Intrinsic stretches 4m 4m 4m 4m 4m 4m 4m 4m 4m 4m   HEP  5x day                                                                                                        Ther Ex             A/PROM R wrist  2m 2m 2m 2m 2m 2m 2m 2m 2m 2m   AROM R thumb  2m 2m 2m 2m 2m 2m 2m 2m 2m 2m   Wrist w/cone  2m 2m 2m 2m        Wrist PRE- e/f/rd 3#  3x10 3#  3x10 3#  3x10 4#  3x10 1#  3x10 2#  3x10 2#  3x10 3#  3x10 3#  3x10 3#  3x10   powerweb   Blue  4x10 Black  4x10 Black  4x10 Black  4x10 Green  3x10 Green  3x10 Blue  3x10 Blue  4x10 Blue  4x10 Blue  4x10   flexbar P/S Red  4x10 Red  4x10 Green  4x10 Green  4x10    Red  4x10 Red  4x10 Red  4x10                             Ther Activity             Pegs with alt opp 40x 40x x40 x40 x40 x40  x40  x40 x40 40x   Pinch pins  Red  4 sets Green  4 sets  EMCOR sets  Green  4 sets Yellow  3x10 Red  3x10 Red  3x10 Red  3x10 Red  3x10 Red  3x10   Grooved pegs       30x 30x 30x 30x   fxnal grasp  2 2# ball  3x10 2 2#  Ball  3x10 2 2#  Ball  3x10 2 2#  Ball  3x10                      Modalities MH  8m 8m 8m 8m 8m 8m 8m 8m 8m    Cp post  6m 6m 6m 6m 6m 6m 6m 6m 6m

## 2022-10-24 ENCOUNTER — EVALUATION (OUTPATIENT)
Dept: OCCUPATIONAL THERAPY | Facility: CLINIC | Age: 67
End: 2022-10-24
Payer: COMMERCIAL

## 2022-10-24 DIAGNOSIS — Z47.89 AFTERCARE FOLLOWING SURGERY OF THE MUSCULOSKELETAL SYSTEM: ICD-10-CM

## 2022-10-24 DIAGNOSIS — M19.90 ARTHRITIS: Primary | ICD-10-CM

## 2022-10-24 PROCEDURE — 97110 THERAPEUTIC EXERCISES: CPT | Performed by: OCCUPATIONAL THERAPIST

## 2022-10-24 PROCEDURE — 97140 MANUAL THERAPY 1/> REGIONS: CPT | Performed by: OCCUPATIONAL THERAPIST

## 2022-10-24 NOTE — PROGRESS NOTES
Daily Note     Today's date: 10/24/2022  Patient name: Viviane Harmon  : 1955  MRN: [id number]  Referring provider: ROD Constantino*  Dx:   Encounter Diagnosis     ICD-10-CM    1  Arthritis  M19 90    2  Aftercare following surgery of the musculoskeletal system  Z47 89                   Subjective: I am being doing better       Objective: See treatment diary below               Assessment  Assessment details: Soraya Ek presents s/p R CMC TA on 22  He is wearing a neoprene splint with a MCP hyperextension block   He has mild pain   He has moderate edema   He has decreased wrist and thumb ROM   He is working   He is limited with gripping , lifting, bottles , jars   He is working as    He has help from his  Wife at home  He would like to be able to golf   Impairments: abnormal or restricted ROM, activity intolerance, impaired physical strength, lacks appropriate home exercise program and pain with function  Functional limitations: unable to pinch , open  bottles , jars , button  Symptom irritability: moderateUnderstanding of Dx/Px/POC: good   Prognosis: good    Goals  STG-1 wk  1- I with HEP - met   2- worst pain 5/10- met     LTG-10 wks  1- no pain with use L hand- not met   2- I ADL - self care- met   3- L  and pinches 50% of R met   4- resume golf - met              Subjective Evaluation    History of Present Illness  Date of surgery: 2022  Mechanism of injury: R CMC TA   Fitted with MCP ext block 22    Quality of life: good    Pain  Current pain ratin  At best pain ratin  At worst pain ratin  Location: R CMC   Quality: dull ache  Relieving factors: support and rest  Progression: improved    Social Support  Steps to enter house: yes  Stairs in house: yes   Lives in: multiple-level home  Lives with: spouse    Employment status: working  Hand dominance: right    Treatments  Previous treatment: immobilization  Patient Goals  Patient goals for therapy: decreased edema, decreased pain, increased motion, independence with ADLs/IADLs, increased strength and return to sport/leisure activities          Objective     Observations     Additional Observation Details  Steri strips on scar volar R CMC     Palpation     Additional Palpation Details  Tender surgical site     Neurological Testing     Additional Neurological Details  Denies parasthesias     Active Range of Motion     Right Wrist   Wrist flexion: 60 previous 40 degrees   Wrist extension:55 previous  30 degrees   Radial deviation: 12 degrees   Ulnar deviation:25 previous  20 degrees     Right Thumb   Flexion     MP: 0    DIP: 0  Extension     MP: 30    DIP: 60  Palmar Abduction    CMC: 50  Opposition: opp 5th base   Additional Active Range of Motion Details  Digit ROM WNL    hyper ext MCP - using blocking splint    Strength/Myotome Testing           (2nd hand position)   R/L= 30/45    Thumb Strength  Key/Lateral Pinch    R/L= 12/ 15  Palmar/Three-Point Pinch    R/L= 8/10    Tests     Left Wrist/Hand   Positive extrinsic flexor tightness  Right Wrist/Hand     intrinsic muscle tightness resolved      Swelling     Left Wrist/Hand   Circumference wrist: 20 cm    Right Wrist/Hand   Circumference wrist: 21 5 cm        Plan: ween , L hand to have surgery              Precautions: [facility] TA 7/14/22- [place] Post op Protocol     Precautions Maintain webspace, no CMC Adduction x 6wks  -2 wks - Comfort Cool    Begin ROM, edema control, scar management , fine motor   -6wks - begin strengthening        Manuals 9/26 9/29  10/6 10/13 10/24 9/1 9/8 9/15 9/19 9/22   graston  2m 2m 2m 2m 2m 2m 2m 2m 2m 2m   Scar massage              Retrograde massage  4m 4m 4m 4m 4m 4m 4m 4m 4m 4m   Intrinsic stretches 4m 4m 4m 4m 4m 4m 4m 4m 4m 4m   HEP  5x day                                                                                                        Ther Ex             A/PROM R wrist  2m 2m 2m 2m 2m 2m 2m 2m 2m 2m   AROM R thumb  2m 2m 2m 2m 2m 2m 2m 2m 2m 2m   Wrist w/cone  2m 2m 2m 2m        Wrist PRE- e/f/rd 3#  3x10 3#  3x10 3#  3x10 4#  3x10 4#  3x10 2#  3x10 2#  3x10 3#  3x10 3#  3x10 3#  3x10   powerweb   Blue  4x10 Black  4x10 Black  4x10 Black  4x10 Black  4x10 Green  3x10 Blue  3x10 Blue  4x10 Blue  4x10 Blue  4x10   flexbar P/S Red  4x10 Red  4x10 Green  4x10 Green  4x10 Green  4x10   Red  4x10 Red  4x10 Red  4x10                             Ther Activity             Pegs with alt opp 40x 40x x40 x40 x40 x40  x40  x40 x40 40x   Pinch pins  Red  4 sets Green  4 sets  EMCOR sets  Green  4 sets Yellow  3x10 Red  3x10 Red  3x10 Red  3x10 Red  3x10 Red  3x10   Grooved pegs       30x 30x 30x 30x   fxnal grasp  2 2# ball  3x10 2 2#  Ball  3x10 2 2#  Ball  3x10 2 2#  Ball  3x10                      Modalities             MH  8m 8m 8m 8m 8m 8m 8m 8m 8m    Cp post  6m 6m 6m 6m 6m 6m 6m 6m 6m

## 2022-10-31 ENCOUNTER — OFFICE VISIT (OUTPATIENT)
Dept: OBGYN CLINIC | Facility: CLINIC | Age: 67
End: 2022-10-31

## 2022-10-31 VITALS
HEIGHT: 75 IN | DIASTOLIC BLOOD PRESSURE: 68 MMHG | BODY MASS INDEX: 34.19 KG/M2 | WEIGHT: 275 LBS | SYSTOLIC BLOOD PRESSURE: 122 MMHG

## 2022-10-31 DIAGNOSIS — M18.11 ARTHRITIS OF CARPOMETACARPAL (CMC) JOINT OF RIGHT THUMB: ICD-10-CM

## 2022-10-31 DIAGNOSIS — M18.12 ARTHRITIS OF CARPOMETACARPAL (CMC) JOINT OF LEFT THUMB: Primary | ICD-10-CM

## 2022-10-31 NOTE — PROGRESS NOTES
ASSESSMENT/PLAN:    Assessment:   S/P right thumb interpositional arthroplasty - Right on 7/14/2022  Left thumb CMC osteoarthritis    Plan:   Patient was advised to continue using his dorsal blocking splint for heavy activities on his right thumb  He was advised that his swelling can continue for 9 months to a year after surgery  He was advised that he will be in a cast post operatively for 4 weeks once his sutures are removed in the office at 2 weeks  We will see him post operatively  Follow Up: After Surgery    To Do Next Visit:    and Sutures out    General Discussions:       Operative Discussions:     Standard Consent: The risks and benefits of the procedure were explained to the patient, which include, but are not limited to: Bleeding, infection, recurrence, pain, scar, damage to tendons, damage to nerves, and damage to blood vessels, failure to give desired results and complications related to anesthesia  These risks, along with alternative conservative treatment options, and postoperative protocols were voiced back and understood by the patient  All questions were answered to the patient's satisfaction  The patient agrees to comply with a standard postoperative protocol, and is willing to proceed  Education was provided via written and auditory forms  There were no barriers to learning  Written handouts regarding wound care, incision and scar care, and general preoperative information was provided to the patient  Prior to surgery, the patient may be requested to stop all anti-inflammatory medications  Prophylactic aspirin, Plavix, and Coumadin may be allowed to be continued  Medications including vitamin E , ginkgo, and fish oil are requested to be stopped approximately one week prior to surgery  Hypertensive medications and beta blockers, if taken, should be continued      _____________________________________________________  CHIEF COMPLAINT:  Chief Complaint   Patient presents with   • Right Thumb - Follow-up, Swelling     S/P right thumb interpositional arthroplasty -7/14/2022         SUBJECTIVE:  Casey Camacho  is a 75 y o  male who presents for follow up regarding S/P right thumb interpositional arthroplasty - Right on 7/14/2022 and Left thumb CMC osteoarthritis  Since last visit, Casey Camacho  has tried Resume activities as tolerated with relief  Today there is Pain  Moderate  Constant  Sharp and Aching to the left thumb  Radiation: Yes to the  left thumb  Associated symptoms: No Complaints    PAST MEDICAL HISTORY:  Past Medical History:   Diagnosis Date   • Anxiety    • Atrial fibrillation (HCC)    • Colon polyp    • CPAP (continuous positive airway pressure) dependence     Has not been using   • GERD (gastroesophageal reflux disease)    • Hypercholesterolemia    • Hypertension    • Sleep apnea     not using CPAP       PAST SURGICAL HISTORY:  Past Surgical History:   Procedure Laterality Date   • ABDOMINAL SURGERY     • COLONOSCOPY      July 2019:  Sessile serrated adenoma  June 2014, hyperplastic polyps and internal hemorrhoids   • ESOPHAGOGASTRODUODENOSCOPY      Irregular Z-line and gastritis biopsies negative for H pylori or Newton's   • HYDROCELE EXCISION / REPAIR     • SD REPAIR INTERCARP/CARP-METACARP JT Right 7/14/2022    Procedure: right thumb interpositional arthroplasty;  Surgeon: Vaishali Jacobsen MD;  Location:  MAIN OR;  Service: Orthopedics   • UMBILICAL HERNIA REPAIR     • UPPER GASTROINTESTINAL ENDOSCOPY      October 2020: Irregular Z-line and gastritis, biopsies negative for EOE, Newton's, H pylori     2008:  Negative for mass ulcer or Newton's   • VOLVULUS REDUCTION  2015       FAMILY HISTORY:  Family History   Problem Relation Age of Onset   • Breast cancer Mother    • Esophageal cancer Father    • Cancer Father    • Diabetes Family    • Breast cancer Family    • Esophageal cancer Family    • Colon cancer Family    • Colon cancer Brother        SOCIAL HISTORY:  Social History     Tobacco Use   • Smoking status: Never Smoker   • Smokeless tobacco: Never Used   Vaping Use   • Vaping Use: Never used   Substance Use Topics   • Alcohol use: Not Currently     Comment: 2-3 daily, red wine or scotch   • Drug use: No       MEDICATIONS:    Current Outpatient Medications:   •  amLODIPine (NORVASC) 10 mg tablet, Take 10 mg by mouth daily, Disp: , Rfl: 1  •  esomeprazole (NexIUM) 40 MG capsule, TAKE 1 CAPSULE BY MOUTH EVERY DAY, Disp: 90 capsule, Rfl: 3  •  losartan-hydrochlorothiazide (HYZAAR) 100-25 MG per tablet, Take 1 tablet by mouth daily, Disp: , Rfl: 0  •  metoprolol tartrate (LOPRESSOR) 50 mg tablet, Take 50 mg by mouth every 12 (twelve) hours, Disp: , Rfl:   •  PARoxetine (PAXIL) 20 mg tablet, Take 20 mg by mouth daily, Disp: , Rfl: 0  •  pravastatin (PRAVACHOL) 40 mg tablet, Take 40 mg by mouth daily, Disp: , Rfl: 0  •  rivaroxaban (XARELTO) 20 mg tablet, Take 20 mg by mouth daily with breakfast, Disp: , Rfl:     ALLERGIES:  No Known Allergies    REVIEW OF SYSTEMS:  Pertinent items are noted in HPI      LABS:  HgA1c:   Lab Results   Component Value Date    HGBA1C 5 2 09/01/2021     BMP:   Lab Results   Component Value Date    K 4 5 06/20/2022    CO2 24 06/20/2022     06/20/2022    BUN 16 06/20/2022    CREATININE 0 78 06/20/2022           _____________________________________________________  PHYSICAL EXAMINATION:  Vital signs: /68   Ht 6' 3" (1 905 m)   Wt 125 kg (275 lb)   BMI 34 37 kg/m²   General: well developed and well nourished, alert, oriented times 3 and appears comfortable  Psychiatric: Normal  HEENT: Trachea Midline, No torticollis  Cardiovascular: No discernable arrhythmia  Pulmonary: No wheezing or stridor  Abdomen: No rebound or guarding  Extremities: No peripheral edema  Skin: No masses, erythema, lacerations, fluctation, ulcerations  Neurovascular: Sensation Intact to the Median, Ulnar, Radial Nerve, Motor Intact to the Median, Ulnar, Radial Nerve and Pulses Intact    MUSCULOSKELETAL EXAMINATION:  LEFT SIDE:  CMC: Positive grind, Positive tendnerness CMC, Positive Shoulder Sign and Positive Hyperextension MP  RIGHT SIDE:  CMC: healed incision, minimal MP jt hyperextension, opposition intact swelling to dorsal radial wrist     _____________________________________________________  STUDIES REVIEWED:  No Studies to review      PROCEDURES PERFORMED:  Procedures  No Procedures performed today   Scribe Attestation    I,:   am acting as a scribe while in the presence of the attending physician :       I,:   personally performed the services described in this documentation    as scribed in my presence :

## 2022-11-25 ENCOUNTER — OFFICE VISIT (OUTPATIENT)
Dept: GASTROENTEROLOGY | Facility: CLINIC | Age: 67
End: 2022-11-25

## 2022-11-25 VITALS
WEIGHT: 273 LBS | SYSTOLIC BLOOD PRESSURE: 118 MMHG | HEIGHT: 75 IN | BODY MASS INDEX: 33.94 KG/M2 | DIASTOLIC BLOOD PRESSURE: 70 MMHG

## 2022-11-25 DIAGNOSIS — Z80.0 FAMILY HISTORY OF COLON CANCER: ICD-10-CM

## 2022-11-25 DIAGNOSIS — Z86.010 HISTORY OF COLON POLYPS: ICD-10-CM

## 2022-11-25 DIAGNOSIS — K21.9 GASTROESOPHAGEAL REFLUX DISEASE WITHOUT ESOPHAGITIS: Primary | ICD-10-CM

## 2022-11-25 RX ORDER — ESOMEPRAZOLE MAGNESIUM 40 MG/1
40 CAPSULE, DELAYED RELEASE ORAL DAILY
Qty: 90 CAPSULE | Refills: 5 | Status: SHIPPED | OUTPATIENT
Start: 2022-11-25

## 2022-11-25 NOTE — PROGRESS NOTES
[address] [facility] - Outpatient Follow-up Note  Margaux Bernal  75 y o  male MRN: [id number]  Encounter: [id number]    ASSESSMENT AND PLAN:      1  Gastroesophageal reflux disease without esophagitis  Doing well on Nexium 40 milligrams daily  - esomeprazole (NexIUM) 40 MG capsule; Take 1 capsule (40 mg total) by mouth daily  Dispense: 90 capsule; Refill: 5    2  History of colon polyps  3  Family history of colon cancer  Last colonoscopy June 2019  Due follow-up 2024      Followup Appointment:  1 year  ______________________________________________________________________    Chief Complaint   Patient presents with   • Yearly follow up-GERD     HPI:  Patient is seen back in the office today  From a GERD standpoint he is doing well  He takes Nexium 40 milligrams daily  He denies any significant breakthrough heartburn or indigestion  He denies any dysphagia, odynophagia, early satiety  He moves his bowels 2 to 3 times a day which is regular  Denies any rectal bleeding  His weight is stable      Historical Information   Past Medical History:   Diagnosis Date   • Anxiety    • Atrial fibrillation ([address] )    • Colon polyp    • CPAP (continuous positive airway pressure) dependence     Has not been using   • GERD (gastroesophageal reflux disease)    • Hypercholesterolemia    • Hypertension    • Sleep apnea     not using CPAP     Past Surgical History:   Procedure Laterality Date   • ABDOMINAL SURGERY     • COLONOSCOPY      July 2019:  Sessile serrated adenoma  June 2014, hyperplastic polyps and internal hemorrhoids   • ESOPHAGOGASTRODUODENOSCOPY      Irregular Z-line and gastritis biopsies negative for H pylori or Newton's   • HYDROCELE EXCISION / REPAIR     • [place] REPAIR INTERCARP/CARP-METACARP JT Right 7/14/2022    Procedure: right thumb interpositional arthroplasty;  Surgeon: Marlyn Carroll MD;  Location:  MAIN OR;  Service: Orthopedics   • UMBILICAL HERNIA REPAIR     • UPPER GASTROINTESTINAL ENDOSCOPY      October 2020: Irregular Z-line and gastritis, biopsies negative for EOE, Newton's, H pylori  2008:  Negative for mass ulcer or Newton's   • VOLVULUS REDUCTION  2015     Social History     Substance and Sexual Activity   Alcohol Use Not Currently    Comment: 2-3 daily, red wine or scotch     Social History     Substance and Sexual Activity   Drug Use No     Social History     Tobacco Use   Smoking Status Never   Smokeless Tobacco Never     Family History   Problem Relation Age of Onset   • Breast cancer Mother    • Esophageal cancer Father    • Cancer Father         Esophageal   • Diabetes Family    • Breast cancer Family    • Esophageal cancer Family    • Colon cancer Family    • Colon cancer Brother    • Colon cancer Brother    • Diabetes Brother          Current Outpatient Medications:   •  amLODIPine (NORVASC) 10 mg tablet  •  esomeprazole (NexIUM) 40 MG capsule  •  losartan-hydrochlorothiazide (HYZAAR) 100-25 MG per tablet  •  metoprolol tartrate (LOPRESSOR) 50 mg tablet  •  PARoxetine (PAXIL) 20 mg tablet  •  pravastatin (PRAVACHOL) 40 mg tablet  •  rivaroxaban (XARELTO) 20 mg tablet  No Known Allergies  Reviewed medications and allergies and updated as indicated    PHYSICAL EXAM:    Blood pressure 118/70, height 6' 3" (1 905 m), weight 124 kg (273 lb)  Body mass index is 34 12 kg/m²  General Appearance: NAD, cooperative, alert  Eyes: Anicteric, PERRLA, EOMI  ENT:  Normocephalic, atraumatic, normal mucosa  Neck:  Supple, symmetrical, trachea midline  Resp:  Clear to auscultation bilaterally; no rales, rhonchi or wheezing; respirations unlabored   CV:  S1 S2, Regular rate and rhythm; no murmur, rub, or gallop  GI:  Soft, non-tender, non-distended; normal bowel sounds; no masses, no organomegaly   Rectal: Deferred  Musculoskeletal: No cyanosis, clubbing or edema  Normal ROM    Skin:  No jaundice, rashes, or lesions   Heme/Lymph: No palpable cervical lymphadenopathy  Psych: Normal affect, good eye contact  Neuro: No gross deficits, AAOx3    Lab Results:   Lab Results   Component Value Date    WBC 4 0 06/20/2022    HGB 14 8 06/20/2022    HCT 43 4 06/20/2022    MCV 91 06/20/2022     06/20/2022     Lab Results   Component Value Date    K 4 5 06/20/2022     06/20/2022    CO2 24 06/20/2022    BUN 16 06/20/2022    CREATININE 0 78 06/20/2022    EGFR 98 06/20/2022       Radiology Results:   No results found

## 2022-11-25 NOTE — LETTER
November 25, 2022     Dionne Nguyen MD  [address] [place] [id number]    Patient: Dayna Katz  YOB: 1955   Date of Visit: 11/25/2022       Dear Dr Ashutosh Holley: Thank you for referring Waqar Pham to me for evaluation  Below are my notes for this consultation  If you have questions, please do not hesitate to call me  I look forward to following your patient along with you  Sincerely,        Michel Carmichael MD        CC: No Recipients  Michel Carmichael MD  11/25/2022 11:52 AM  Sign when Signing Visit  [facility] - Outpatient Follow-up Note  Dayna Katz  75 y o  male MRN: [id number]  Encounter: [id number]    ASSESSMENT AND PLAN:      1  Gastroesophageal reflux disease without esophagitis  Doing well on Nexium 40 milligrams daily  - esomeprazole (NexIUM) 40 MG capsule; Take 1 capsule (40 mg total) by mouth daily  Dispense: 90 capsule; Refill: 5    2  History of colon polyps  3  Family history of colon cancer  Last colonoscopy June 2019  Due follow-up 2024      Followup Appointment:  1 year  ______________________________________________________________________    Chief Complaint   Patient presents with   • Yearly follow up-GERD     HPI:  Patient is seen back in the office today  From a GERD standpoint he is doing well  He takes Nexium 40 milligrams daily  He denies any significant breakthrough heartburn or indigestion  He denies any dysphagia, odynophagia, early satiety  He moves his bowels 2 to 3 times a day which is regular  Denies any rectal bleeding  His weight is stable      Historical Information   Past Medical History:   Diagnosis Date   • Anxiety    • Atrial fibrillation (HCC)    • Colon polyp    • CPAP (continuous positive airway pressure) dependence     Has not been using   • GERD (gastroesophageal reflux disease)    • Hypercholesterolemia    • Hypertension    • Sleep apnea     not using CPAP     Past Surgical History:   Procedure Laterality Date   • ABDOMINAL SURGERY     • COLONOSCOPY      July 2019:  Sessile serrated adenoma  June 2014, hyperplastic polyps and internal hemorrhoids   • ESOPHAGOGASTRODUODENOSCOPY      Irregular Z-line and gastritis biopsies negative for H pylori or Newton's   • HYDROCELE EXCISION / REPAIR     • [place] REPAIR INTERCARP/CARP-METACARP JT Right 7/14/2022    Procedure: right thumb interpositional arthroplasty;  Surgeon: Esa Cao MD;  Location:  MAIN OR;  Service: Orthopedics   • UMBILICAL HERNIA REPAIR     • UPPER GASTROINTESTINAL ENDOSCOPY      October 2020: Irregular Z-line and gastritis, biopsies negative for EOE, Newton's, H pylori  2008:  Negative for mass ulcer or Newton's   • VOLVULUS REDUCTION  2015     Social History     Substance and Sexual Activity   Alcohol Use Not Currently    Comment: 2-3 daily, red wine or scotch     Social History     Substance and Sexual Activity   Drug Use No     Social History     Tobacco Use   Smoking Status Never   Smokeless Tobacco Never     Family History   Problem Relation Age of Onset   • Breast cancer Mother    • Esophageal cancer Father    • Cancer Father         Esophageal   • Diabetes Family    • Breast cancer Family    • Esophageal cancer Family    • Colon cancer Family    • Colon cancer Brother    • Colon cancer Brother    • Diabetes Brother          Current Outpatient Medications:   •  amLODIPine (NORVASC) 10 mg tablet  •  esomeprazole (NexIUM) 40 MG capsule  •  losartan-hydrochlorothiazide (HYZAAR) 100-25 MG per tablet  •  metoprolol tartrate (LOPRESSOR) 50 mg tablet  •  PARoxetine (PAXIL) 20 mg tablet  •  pravastatin (PRAVACHOL) 40 mg tablet  •  rivaroxaban (XARELTO) 20 mg tablet  No Known Allergies  Reviewed medications and allergies and updated as indicated    PHYSICAL EXAM:    Blood pressure 118/70, height 6' 3" (1 905 m), weight 124 kg (273 lb)  Body mass index is 34 12 kg/m²    General Appearance: NAD, cooperative, alert  Eyes: Anicteric, PERRLA, EOMI  ENT:  Normocephalic, atraumatic, normal mucosa  Neck:  Supple, symmetrical, trachea midline  Resp:  Clear to auscultation bilaterally; no rales, rhonchi or wheezing; respirations unlabored   CV:  S1 S2, Regular rate and rhythm; no murmur, rub, or gallop  GI:  Soft, non-tender, non-distended; normal bowel sounds; no masses, no organomegaly   Rectal: Deferred  Musculoskeletal: No cyanosis, clubbing or edema  Normal ROM  Skin:  No jaundice, rashes, or lesions   Heme/Lymph: No palpable cervical lymphadenopathy  Psych: Normal affect, good eye contact  Neuro: No gross deficits, AAOx3    Lab Results:   Lab Results   Component Value Date    WBC 4 0 06/20/2022    HGB 14 8 06/20/2022    HCT 43 4 06/20/2022    MCV 91 06/20/2022     06/20/2022     Lab Results   Component Value Date    K 4 5 06/20/2022     06/20/2022    CO2 24 06/20/2022    BUN 16 06/20/2022    CREATININE 0 78 06/20/2022    EGFR 98 06/20/2022       Radiology Results:   No results found

## 2022-11-30 NOTE — PRE-PROCEDURE INSTRUCTIONS
Pre-Surgery Instructions:   Medication Instructions   • amLODIPine (NORVASC) 10 mg tablet Take day of surgery  • esomeprazole (NexIUM) 40 MG capsule Take as directed   • losartan-hydrochlorothiazide (HYZAAR) 100-25 MG per tablet Hold day of surgery  • metoprolol tartrate (LOPRESSOR) 50 mg tablet Take day of surgery  • PARoxetine (PAXIL) 20 mg tablet Take night before surgery   • pravastatin (PRAVACHOL) 40 mg tablet Take night before surgery   • rivaroxaban (XARELTO) 20 mg tablet Pt has been holding since 12/27/22      My Surgical Experience    The following information was developed to assist you to prepare for your operation  What do I need to do before coming to the hospital?  • Arrange for a responsible person to drive you to and from the hospital   • Arrange care for your children at home  Children are not allowed in the recovery areas of the hospital  • Plan to wear clothing that is easy to put on and take off  If you are having shoulder surgery, wear a shirt that buttons or zippers in the front  Bathing  o Shower the evening before and the morning of your surgery with an antibacterial soap  Please refer to the Pre Op Showering Instructions for Surgery Patients Sheet   o Remove nail polish and all body piercing jewelry  o Do not shave any body part for at least 24 hours before surgery-this includes face, arms, legs and upper body  Food  o Nothing to eat or drink after midnight the night before your surgery   This includes candy and chewing gum  o Exception: If your surgery is after 12:00pm (noon), you may have clear liquids such as 7-Up®, ginger ale, apple or cranberry juice, Jell-O®, water, or clear broth until 8:00 am  o Do not drink milk or juice with pulp on the morning before surgery  o Do not drink alcohol 24 hours before surgery  Medicine  o Follow instructions you received from your surgeon about which medicines you may take on the day of surgery  o If instructed to take medicine on the morning of surgery, take pills with just a small sip of water  Call your prescribing doctor for specific infroamtion on what to do if you take insulin    What should I bring to the hospital?    Bring:  • Crutches or a walker, if you have them, for foot or knee surgery  • A list of the daily medicines, vitamins, minerals, herbals and nutritional supplements you take  Include the dosages of medicines and the time you take them each day  • Glasses, dentures or hearing aids  • Minimal clothing; you will be wearing hospital sleepwear  • Photo ID; required to verify your identity  • If you have a Living Will or Power of , bring a copy of the documents  • If you have an ostomy, bring an extra pouch and any supplies you use    Do not bring  • Medicines or inhalers  • Money, valuables or jewelry    What other information should I know about the day of surgery? • Notify your surgeons if you develop a cold, sore throat, cough, fever, rash or any other illness  • Report to the Ambulatory Surgical/Same Day Surgery Unit  • You will be instructed to stop at Registration only if you have not been pre-registered  • Inform your  fi they do not stay that they will be asked by the staff to leave a phone number where they can be reached  • Be available to be reached before surgery  In the event the operating room schedule changes, you may be asked to come in earlier or later than expected    *It is important to tell your doctor and others involved in your health care if you are taking or have been taking any non-prescription drugs, vitamins, minerals, herbals or other nutritional supplements   Any of these may interact with some food or medicines and cause a reaction

## 2022-12-01 ENCOUNTER — ANESTHESIA (OUTPATIENT)
Dept: PERIOP | Facility: HOSPITAL | Age: 67
End: 2022-12-01

## 2022-12-01 ENCOUNTER — ANESTHESIA EVENT (OUTPATIENT)
Dept: PERIOP | Facility: HOSPITAL | Age: 67
End: 2022-12-01

## 2022-12-01 ENCOUNTER — HOSPITAL ENCOUNTER (OUTPATIENT)
Facility: HOSPITAL | Age: 67
Setting detail: OUTPATIENT SURGERY
Discharge: HOME | End: 2022-12-01
Attending: ORTHOPAEDIC SURGERY | Admitting: ORTHOPAEDIC SURGERY

## 2022-12-01 VITALS
TEMPERATURE: 98 F | SYSTOLIC BLOOD PRESSURE: 165 MMHG | BODY MASS INDEX: 33.32 KG/M2 | OXYGEN SATURATION: 91 % | RESPIRATION RATE: 12 BRPM | WEIGHT: 268 LBS | HEART RATE: 72 BPM | HEIGHT: 75 IN | DIASTOLIC BLOOD PRESSURE: 80 MMHG

## 2022-12-01 DIAGNOSIS — M18.12 ARTHRITIS OF CARPOMETACARPAL (CMC) JOINT OF LEFT THUMB: Primary | ICD-10-CM

## 2022-12-01 DEVICE — K-WIRE 1.6MM X 9INL SMTH XMOND/XMOND: Type: IMPLANTABLE DEVICE | Site: FINGER | Status: FUNCTIONAL

## 2022-12-01 RX ORDER — ACETAMINOPHEN 325 MG/1
650 TABLET ORAL EVERY 6 HOURS PRN
Status: DISCONTINUED | OUTPATIENT
Start: 2022-12-01 | End: 2022-12-01 | Stop reason: HOSPADM

## 2022-12-01 RX ORDER — TRAMADOL HYDROCHLORIDE 50 MG/1
50 TABLET ORAL EVERY 6 HOURS PRN
Status: DISCONTINUED | OUTPATIENT
Start: 2022-12-01 | End: 2022-12-01 | Stop reason: HOSPADM

## 2022-12-01 RX ORDER — HYDROCODONE BITARTRATE AND ACETAMINOPHEN 5; 325 MG/1; MG/1
1 TABLET ORAL EVERY 6 HOURS PRN
Qty: 15 TABLET | Refills: 0 | Status: SHIPPED | OUTPATIENT
Start: 2022-12-01 | End: 2022-12-12 | Stop reason: ALTCHOICE

## 2022-12-01 RX ORDER — LIDOCAINE HYDROCHLORIDE 10 MG/ML
INJECTION, SOLUTION EPIDURAL; INFILTRATION; INTRACAUDAL; PERINEURAL AS NEEDED
Status: DISCONTINUED | OUTPATIENT
Start: 2022-12-01 | End: 2022-12-01

## 2022-12-01 RX ORDER — MIDAZOLAM HYDROCHLORIDE 2 MG/2ML
INJECTION, SOLUTION INTRAMUSCULAR; INTRAVENOUS AS NEEDED
Status: DISCONTINUED | OUTPATIENT
Start: 2022-12-01 | End: 2022-12-01

## 2022-12-01 RX ORDER — CEFAZOLIN SODIUM 2 G/50ML
2000 SOLUTION INTRAVENOUS ONCE
Status: COMPLETED | OUTPATIENT
Start: 2022-12-01 | End: 2022-12-01

## 2022-12-01 RX ORDER — FENTANYL CITRATE 50 UG/ML
INJECTION, SOLUTION INTRAMUSCULAR; INTRAVENOUS AS NEEDED
Status: DISCONTINUED | OUTPATIENT
Start: 2022-12-01 | End: 2022-12-01

## 2022-12-01 RX ORDER — LIDOCAINE HYDROCHLORIDE AND EPINEPHRINE 10; 10 MG/ML; UG/ML
INJECTION, SOLUTION INFILTRATION; PERINEURAL AS NEEDED
Status: DISCONTINUED | OUTPATIENT
Start: 2022-12-01 | End: 2022-12-01 | Stop reason: HOSPADM

## 2022-12-01 RX ORDER — FENTANYL CITRATE/PF 50 MCG/ML
25 SYRINGE (ML) INJECTION
Status: DISCONTINUED | OUTPATIENT
Start: 2022-12-01 | End: 2022-12-01 | Stop reason: HOSPADM

## 2022-12-01 RX ORDER — DEXAMETHASONE SODIUM PHOSPHATE 10 MG/ML
INJECTION, SOLUTION INTRAMUSCULAR; INTRAVENOUS AS NEEDED
Status: DISCONTINUED | OUTPATIENT
Start: 2022-12-01 | End: 2022-12-01

## 2022-12-01 RX ORDER — PROPOFOL 10 MG/ML
INJECTION, EMULSION INTRAVENOUS AS NEEDED
Status: DISCONTINUED | OUTPATIENT
Start: 2022-12-01 | End: 2022-12-01

## 2022-12-01 RX ORDER — ONDANSETRON 2 MG/ML
4 INJECTION INTRAMUSCULAR; INTRAVENOUS ONCE
Status: DISCONTINUED | OUTPATIENT
Start: 2022-12-01 | End: 2022-12-01 | Stop reason: HOSPADM

## 2022-12-01 RX ORDER — EPHEDRINE SULFATE 50 MG/ML
INJECTION INTRAVENOUS AS NEEDED
Status: DISCONTINUED | OUTPATIENT
Start: 2022-12-01 | End: 2022-12-01

## 2022-12-01 RX ORDER — GLYCOPYRROLATE 0.2 MG/ML
INJECTION INTRAMUSCULAR; INTRAVENOUS AS NEEDED
Status: DISCONTINUED | OUTPATIENT
Start: 2022-12-01 | End: 2022-12-01

## 2022-12-01 RX ORDER — ONDANSETRON 2 MG/ML
INJECTION INTRAMUSCULAR; INTRAVENOUS AS NEEDED
Status: DISCONTINUED | OUTPATIENT
Start: 2022-12-01 | End: 2022-12-01

## 2022-12-01 RX ORDER — CEFAZOLIN SODIUM 1 G/50ML
1000 SOLUTION INTRAVENOUS ONCE
Status: COMPLETED | OUTPATIENT
Start: 2022-12-01 | End: 2022-12-01

## 2022-12-01 RX ORDER — ONDANSETRON 2 MG/ML
4 INJECTION INTRAMUSCULAR; INTRAVENOUS EVERY 6 HOURS PRN
Status: DISCONTINUED | OUTPATIENT
Start: 2022-12-01 | End: 2022-12-01 | Stop reason: HOSPADM

## 2022-12-01 RX ORDER — SENNOSIDES 8.6 MG
650 CAPSULE ORAL EVERY 8 HOURS PRN
Qty: 30 TABLET | Refills: 0 | Status: SHIPPED | OUTPATIENT
Start: 2022-12-01

## 2022-12-01 RX ORDER — SODIUM CHLORIDE, SODIUM LACTATE, POTASSIUM CHLORIDE, CALCIUM CHLORIDE 600; 310; 30; 20 MG/100ML; MG/100ML; MG/100ML; MG/100ML
INJECTION, SOLUTION INTRAVENOUS CONTINUOUS PRN
Status: DISCONTINUED | OUTPATIENT
Start: 2022-12-01 | End: 2022-12-01

## 2022-12-01 RX ADMIN — PROPOFOL 150 MG: 10 INJECTION, EMULSION INTRAVENOUS at 11:41

## 2022-12-01 RX ADMIN — CEFAZOLIN SODIUM 1000 MG: 1 SOLUTION INTRAVENOUS at 11:46

## 2022-12-01 RX ADMIN — FENTANYL CITRATE 50 MCG: 50 INJECTION, SOLUTION INTRAMUSCULAR; INTRAVENOUS at 11:41

## 2022-12-01 RX ADMIN — MIDAZOLAM 2 MG: 1 INJECTION INTRAMUSCULAR; INTRAVENOUS at 11:36

## 2022-12-01 RX ADMIN — FENTANYL CITRATE 25 MCG: 50 INJECTION INTRAMUSCULAR; INTRAVENOUS at 14:05

## 2022-12-01 RX ADMIN — LIDOCAINE HYDROCHLORIDE 50 MG: 10 INJECTION, SOLUTION EPIDURAL; INFILTRATION; INTRACAUDAL; PERINEURAL at 11:41

## 2022-12-01 RX ADMIN — ONDANSETRON 4 MG: 2 INJECTION INTRAMUSCULAR; INTRAVENOUS at 11:47

## 2022-12-01 RX ADMIN — FENTANYL CITRATE 25 MCG: 50 INJECTION, SOLUTION INTRAMUSCULAR; INTRAVENOUS at 13:06

## 2022-12-01 RX ADMIN — TRAMADOL HYDROCHLORIDE 50 MG: 50 TABLET, COATED ORAL at 14:40

## 2022-12-01 RX ADMIN — PROPOFOL 50 MG: 10 INJECTION, EMULSION INTRAVENOUS at 11:42

## 2022-12-01 RX ADMIN — DEXAMETHASONE SODIUM PHOSPHATE 10 MG: 10 INJECTION, SOLUTION INTRAMUSCULAR; INTRAVENOUS at 11:47

## 2022-12-01 RX ADMIN — FENTANYL CITRATE 25 MCG: 50 INJECTION INTRAMUSCULAR; INTRAVENOUS at 14:00

## 2022-12-01 RX ADMIN — Medication 50 MG: at 12:10

## 2022-12-01 RX ADMIN — EPHEDRINE SULFATE 10 MG: 50 INJECTION INTRAVENOUS at 12:03

## 2022-12-01 RX ADMIN — GLYCOPYRROLATE 0.2 MCG: 0.2 INJECTION, SOLUTION INTRAMUSCULAR; INTRAVENOUS at 12:42

## 2022-12-01 RX ADMIN — CEFAZOLIN SODIUM 2000 MG: 2 SOLUTION INTRAVENOUS at 11:46

## 2022-12-01 RX ADMIN — FENTANYL CITRATE 25 MCG: 50 INJECTION, SOLUTION INTRAMUSCULAR; INTRAVENOUS at 12:32

## 2022-12-01 RX ADMIN — SODIUM CHLORIDE, SODIUM LACTATE, POTASSIUM CHLORIDE, AND CALCIUM CHLORIDE: .6; .31; .03; .02 INJECTION, SOLUTION INTRAVENOUS at 11:32

## 2022-12-01 NOTE — ANESTHESIA POSTPROCEDURE EVALUATION
Post-Op Assessment Note    CV Status:  Stable  Pain Score: 0    Pain management: adequate     Mental Status:  Arousable   Hydration Status:  Euvolemic   PONV Controlled:  Controlled   Airway Patency:  Patent      Post Op Vitals Reviewed: Yes      Staff: Anesthesiologist, CRNA         No notable events documented      BP   180/92   Temp   97 8   Pulse  92   Resp   16   SpO2   96

## 2022-12-01 NOTE — DISCHARGE INSTRUCTIONS
Post Operative Instructions    You have had surgery on your arm today, please read and follow the information below:  Elevate your hand above your elbow during the next 24-48 hours to help with swelling  Place your hand and arm over your head with motion at your shoulder three times a day  Do not apply any cream/ointment/oil to your incisions including antibiotics  Do not soak your hands in standing water (dishwater, tubs, Jacuzzi's, pools, etc ) until given permission (typically 2-3 weeks after injury)    Call the office if you notice any:  Increased numbness or tingling of your hand or fingers that is not relieved with elevation  Increasing pain that is not controlled with medication  Difficulty chewing, breathing, swallowing  Chest pains or shortness of breath  Fever over 101 4 degrees  Bandage: Do NOT remove bandage until follow-up appointment  Motion: Move fingers into a fist 5 times a day, DO NOT move any splinted fingers  Weight bearing status: The operated extremity should be non-weight bearing until further notice  Ice: Ice for 10 minutes every hour as needed for swelling x 24 hours  Sling: Sling for comfort for 2-3 days  Medications:   Tylenol Extended Release 650 mg every 8 hours  Norco/Hydrocodone one tab every 6 hours AS NEEDED for pain     Follow-up Appointment: 7-10 days  Please call the office if you have any questions or concerns regarding your post-operative care

## 2022-12-01 NOTE — ANESTHESIA PREPROCEDURE EVALUATION
Procedure:  Left thumb interpositional arthroplasty with pinning of the Metacarpophalangeal joint (Left: Finger)    Relevant Problems   CARDIO   (+) A-fib (HCC)   (+) HTN (hypertension)   (+) Hyperlipidemia      GI/HEPATIC   (+) Gastroesophageal reflux disease without esophagitis      MUSCULOSKELETAL   (+) Arthritis   (+) Cervical spondylosis   (+) Idiopathic osteoarthritis      NEURO/PSYCH   (+) Anxiety   (+) Chronic pain syndrome   (+) History of colon polyps      PULMONARY   (+) MARY (obstructive sleep apnea)      Other   (+) CPAP (continuous positive airway pressure) dependence   (+) Obesity (BMI 30 0-34  9)        Physical Exam    Airway  Comment: Thick neck with redundant tissue  Mallampati score: III  TM Distance: >3 FB  Neck ROM: full     Dental   No notable dental hx     Cardiovascular      Pulmonary      Other Findings        Anesthesia Plan  ASA Score- 3     Anesthesia Type- general with ASA Monitors  Additional Monitors:   Airway Plan: LMA  Plan Factors-    Chart reviewed  EKG reviewed  Existing labs reviewed  Patient summary reviewed  Patient is not a current smoker  Induction- intravenous  Postoperative Plan- Plan for postoperative opioid use  Informed Consent- Anesthetic plan and risks discussed with patient  I personally reviewed this patient with the CRNA  Discussed and agreed on the Anesthesia Plan with the CRNA  Ziggy Bauer

## 2022-12-01 NOTE — H&P
H&P Exam - Orthopedics   Maranda Childs  75 y o  male MRN: [id number]  Unit/Bed#: [facility] 06    Assessment/Plan   Assessment:  56-year-old male with left thumb CMC osteoarthritis which has failed conservative treatment measures and scheduled to undergo left thumb CMC interpositional arthroplasty with pinning of metacarpophalangeal joint    Plan:  NPO  To OR for left thumb interpositional arthroplasty  Pain control  DVT prophylaxis-SCDs  IVF    History of Present Illness   HPI:  Maranda Childs  is a 75 y o  male who presents with left thumb CMC osteoarthritis  He had persistent pain despite conservative treatment measures and was scheduled for left thumb trapeziectomy and interpositional arthroplasty and pinning of metacarpophalangeal joint      Historical Information  Review Of Systems:   · Skin: Normal  · Neuro: See HPI  · Musculoskeletal: See HPI  · 14 point review of systems negative except as stated above     Past Medical History:   Past Medical History:   Diagnosis Date   • Anxiety    • Atrial fibrillation ([address] )    • Colon polyp    • CPAP (continuous positive airway pressure) dependence     Has not been using   • GERD (gastroesophageal reflux disease)    • Hypercholesterolemia    • Hypertension    • Sleep apnea     not using CPAP       Past Surgical History:   Past Surgical History:   Procedure Laterality Date   • ABDOMINAL SURGERY     • COLONOSCOPY      July 2019:  Sessile serrated adenoma  June 2014, hyperplastic polyps and internal hemorrhoids   • ESOPHAGOGASTRODUODENOSCOPY      Irregular Z-line and gastritis biopsies negative for H pylori or Newton's   • HYDROCELE EXCISION / REPAIR     • WA REPAIR INTERCARP/CARP-METACARP JT Right 7/14/2022    Procedure: right thumb interpositional arthroplasty;  Surgeon: Marlon Salas MD;  Location:  MAIN OR;  Service: Orthopedics   • UMBILICAL HERNIA REPAIR     • UPPER GASTROINTESTINAL ENDOSCOPY      October 2020: Irregular Z-line and gastritis, biopsies negative for EOE, Newton's, H pylori     2008:  Negative for mass ulcer or Newton's   • VOLVULUS REDUCTION  2015       Family History:  Family history reviewed and non-contributory  Family History   Problem Relation Age of Onset   • Breast cancer Mother    • Esophageal cancer Father    • Cancer Father         Esophageal   • Diabetes Family    • Breast cancer Family    • Esophageal cancer Family    • Colon cancer Family    • Colon cancer Brother    • Colon cancer Brother    • Diabetes Brother        Social History:  Social History     Socioeconomic History   • Marital status: /Civil Union     Spouse name: None   • Number of children: None   • Years of education: None   • Highest education level: None   Occupational History   • None   Tobacco Use   • Smoking status: Never   • Smokeless tobacco: Never   Vaping Use   • Vaping Use: Never used   Substance and Sexual Activity   • Alcohol use: Not Currently     Comment: 2-3 daily, red wine or scotch   • Drug use: No   • Sexual activity: Not Currently     Partners: Female   Other Topics Concern   • None   Social History Narrative   • None     Social Determinants of Health     Financial Resource Strain: Not on file   Food Insecurity: Not on file   Transportation Needs: Not on file   Physical Activity: Not on file   Stress: Not on file   Social Connections: Not on file   Intimate Partner Violence: Not on file   Housing Stability: Not on file       Allergies:   No Known Allergies        Labs:  0   Lab Value Date/Time    HCT 43 4 06/20/2022 0750    HGB 14 8 06/20/2022 0750    WBC 4 0 06/20/2022 0750       Meds:    Current Facility-Administered Medications:   •  ceFAZolin (ANCEF) IVPB (premix in dextrose) 1,000 mg 50 mL, 1,000 mg, Intravenous, Once, Leopold Mitts, MD  •  ceFAZolin (ANCEF) IVPB (premix in dextrose) 2,000 mg 50 mL, 2,000 mg, Intravenous, Once, Leopold Mitts, MD    Blood Culture:   No results found for: BLOODCX    Wound Culture:   No results found for: WOUNDCULT    Ins and Outs:  No intake/output data recorded              Physical Exam  /85   Pulse 56   Temp 97 6 °F (36 4 °C) (Oral)   Resp 16   Ht 6' 3" (1 905 m)   Wt 122 kg (268 lb)   SpO2 96%   BMI 33 50 kg/m²   /85   Pulse 56   Temp 97 6 °F (36 4 °C) (Oral)   Resp 16   Ht 6' 3" (1 905 m)   Wt 122 kg (268 lb)   SpO2 96%   BMI 33 50 kg/m²   Gen: No acute distress, resting comfortably in bed  HEENT: Eyes clear, moist mucus membranes, hearing intact  Respiratory: No audible wheezing or stridor  Cardiovascular: Well Perfused peripherally, 2+ distal pulse  Abdomen: nondistended, no peritoneal signs  Ortho Exam:  Left thumb  Positive tenderness over CMC  Crepitation with passive range of motion  Positive shoulder sign  Positive grind test  70° hyperflexion of MCP joint  Neuro Exam:  Left upper extremity  Sensation intact to medial, radial and ulnar nerve distributions  Motor intact to AIN, PIN and ulnar nerves  Fingers are warm well perfused with less than 2nd capillary refill    Lab Results: Reviewed  Imaging: Reviewed

## 2022-12-01 NOTE — OP NOTE
OPERATIVE REPORT  PATIENT NAME: Olive Leon  :  1955  MRN: [id number]  Pt Location: [facility] OR    SURGERY DATE: 22    Surgeon(s) and Role:     * Rebecca Cooper MD - Primary     * Maria Teresa Walker MD - Assisting     * Madie Tomlin PA-C - Observing    Pre-Op Diagnosis:  Arthritis of carpometacarpal [place]) joint of left thumb [M18 12]    Post-Op Diagnosis:    Arthritis of carpometacarpal (CMC) joint of left thumb [M18 12]    Procedure(s) (LRB):  1  Left thumb interpositional arthroplasty   2  Tenodesis of the EPB to ALB tendon  3  Pinning of the Metacarpophalangeal joint (Left)  Application of thumb spica splint (Left)    Specimen(s):  * No orders in the log *    Estimated Blood Loss:   Minimal      Anesthesia Type:   General    Operative Indications: The patient has a history of Thumb CMC Arthritis  left that was recalcitrant to conservative management  The decision was made to bring the patient to the operating room for a left thumb interpositional arthroplasty with pinning of the metacarpophalangeal joint  Risks of the procedure were explained which include, but are not limited to bleeding; infection; damage to nerves, arteries,veins, tendons; scar; pain; need for reoperation; failure to give desired result; and risks of anaesthesia  All questions were answered to satisfaction and they were willing to proceed  Operative Findings:  Left thumb CMC arthritis    Complications:   None    Procedure and Technique:  After the patient, site, and procedure were identified, the patient was brought into the operating room in a supine position  Regional and general anaesthesia were provided  A well padded tourniquet was applied to the extremity, set at 250 mmHg  The  left upper extremity was then prepped and drapped in a normal, sterile, orthopedic fashion  After the patient, site, and procedure were once again identified, attention was turned to the left thumb    A curvilinear incision was made at the junction of the glaborous and nonglaborous skin extending toward the flexor carpi radialis tendon  Care was taken to protect the superficial sensory branch of the radial nerve, the radial artery, the median nerve, the palmar cutaneous branch of the median nerve, the flexor carpi radialis tendon, the abductor pollicis longus tendon and the extensor pollicis brevis tendon  The thenar muscles were elevated off of the thumb metacarpal and an arthrotomy was done at the trapeziometacarpal and scaphotrapezial joint  The trapezium was removed in its entirety  Inspection revealed stage 4 (full thickiness loss of the articular cartilage with exposed subchondral bone) arthritis at the level of the metacarpal base, stage 3 (partial thickness loss of the articular cartilage) arthritis at the distal pole of the scaphoid, and inspection of the scaphotrapezoid joint revealed stage 1 (minimal fraying of the articular surface) arthritis  At this point, the thumb was held in a reduced position  Utilizing a 2  FiberWire suture, the base of the flexor carpi radialis was sutured to the insertion of the adductor pollicis longus tendon securing the thumb in this reduced position  Multiple passes were then made  A longitudinal stress test was then performed demonstrating no evidence of subsidence or collapse  A tenodesis of the extensor pollicis brevis tendon was made to the abductor pollicis longus tendon to remove some of the hyperextension force on the MCP joint  The thumb was sitting in a good resting position at the completion of multiple suture passes     Gelfoam was placed in the remainder of the hole  A retrograde 0 062 inch c-wire was placed in a retrograde direction across the MCP joint of the thumb from distal radial to proximal ulnar pinning the MCP joint in slight flexion  The pin was cut, bent and pin cap was placed          At the completion of the procedure, hemostasis was obtained with cautery and direct pressure  The wounds were copiously irrigated with sterile solution  The wounds were closed with Vicryl and Prolene  Sterile dressings were applied, including Xeroform, gauze, tweeners, webril, ACE and Thumb Spica Splint  Please note, all sponge, needle, and instrument counts were correct prior to closure  Loupe magnification was utilized  The patient tolerated the procedure well       I was present for all critical portions of the procedure    Patient Disposition:  PACU , hemodynamically stable and extubated and stable    SIGNATURE: Ray Bennett MD  DATE: 12/01/22  TIME: 1:57 PM

## 2022-12-02 ENCOUNTER — TELEPHONE (OUTPATIENT)
Dept: OBGYN CLINIC | Facility: HOSPITAL | Age: 67
End: 2022-12-02

## 2022-12-02 NOTE — TELEPHONE ENCOUNTER
Caller: Patient    Doctor/Office: Vernon Bender asked to speak to: Clinical      Call was transferred to: Triage nurse

## 2022-12-02 NOTE — TELEPHONE ENCOUNTER
Spoke with patient and he states his pain is not controlled with the Norco 5/325mg every 6 hrs  He is about due for another Norco dosage  Advised to add the tylenol 650mg with this dose and call back in an hour with how he is doing  Patient verbalized understanding  Not sure if he can try Norco 5/325mg 2 tabs for 2 doses then go back to 1 every 6 hrs?

## 2022-12-02 NOTE — TELEPHONE ENCOUNTER
Patient called back and the addition of the 650mg tylenol with the Norco dose did the trick  He will continue to do that with 3 of his Norco doses per day so tylenol stays to the 3000mg torres  Verbalized understanding

## 2022-12-02 NOTE — TELEPHONE ENCOUNTER
Caller: Patient    Doctor/Office: Agustin Aceves asked to speak to: Nurse    Call was transferred to: Nurse

## 2022-12-12 ENCOUNTER — OFFICE VISIT (OUTPATIENT)
Dept: OBGYN CLINIC | Facility: CLINIC | Age: 67
End: 2022-12-12

## 2022-12-12 VITALS
SYSTOLIC BLOOD PRESSURE: 122 MMHG | HEIGHT: 75 IN | DIASTOLIC BLOOD PRESSURE: 80 MMHG | BODY MASS INDEX: 33.32 KG/M2 | WEIGHT: 268 LBS

## 2022-12-12 DIAGNOSIS — M18.12 PRIMARY OSTEOARTHRITIS OF FIRST CARPOMETACARPAL JOINT OF LEFT HAND: Primary | ICD-10-CM

## 2022-12-12 NOTE — PROGRESS NOTES
Assessment:   S/P Left thumb interpositional arthroplasty with pinning of the Metacarpophalangeal joint - Left and Application of thumb spica splint - Left on 12/1/2022    Plan:   Patient was placed into a thumb spica cast today  Cast precautions were given  He was advised to avoid any lifting with the left upper extremity  He will follow-up in 4 weeks for reevaluation and removal of pins      Follow Up:  4  week(s)    To Do Next Visit:  Removal of pin      CHIEF COMPLAINT:  Chief Complaint   Patient presents with   • Left Thumb - Post-op, Suture / Staple Removal     Left thumb interpositional arthroplasty with pinning of the Metacarpophalangeal joint- 12/1/22         SUBJECTIVE:  Cherylene Gros  is a 75 y o  male who presents for follow up after Left thumb interpositional arthroplasty with pinning of the Metacarpophalangeal joint - Left and Application of thumb spica splint - Left on 12/1/2022  Today patient has minimal pain and discomfort         PHYSICAL EXAMINATION:  Vital signs: Ht 6' 3" (1 905 m)   Wt 122 kg (268 lb)   BMI 33 50 kg/m²   General: well developed and well nourished, alert, oriented times 3 and appears comfortable  Psychiatric: Normal    MUSCULOSKELETAL EXAMINATION:  Incision: Clean, dry, intact  Range of Motion: As expected and Limited due to stiffness  Neurovascular status: Neuro intact, good cap refill  Activity Restrictions: Cast/splint restrictions  Done today: Sutures out      STUDIES REVIEWED:  No Studies to review      PROCEDURES PERFORMED:  Procedures  No Procedures performed today    Scribe Attestation    I,:  Sarika Alba PA-C am acting as a scribe while in the presence of the attending physician :       I,:  Zahraa Singh MD personally performed the services described in this documentation    as scribed in my presence :           Scribe Attestation    I,:  Sarika Alba PA-C am acting as a scribe while in the presence of the attending physician :       I,:  LocoMobi Seleta Habermann, MD personally performed the services described in this documentation    as scribed in my presence :

## 2022-12-14 ENCOUNTER — OFFICE VISIT (OUTPATIENT)
Dept: OBGYN CLINIC | Facility: HOSPITAL | Age: 67
End: 2022-12-14

## 2022-12-14 VITALS
DIASTOLIC BLOOD PRESSURE: 72 MMHG | SYSTOLIC BLOOD PRESSURE: 133 MMHG | HEART RATE: 66 BPM | HEIGHT: 75 IN | WEIGHT: 268 LBS | BODY MASS INDEX: 33.32 KG/M2

## 2022-12-14 DIAGNOSIS — M18.12 PRIMARY OSTEOARTHRITIS OF FIRST CARPOMETACARPAL JOINT OF LEFT HAND: Primary | ICD-10-CM

## 2022-12-14 NOTE — PROGRESS NOTES
Assessment:   S/P Left thumb interpositional arthroplasty with pinning of the Metacarpophalangeal joint - Left and Application of thumb spica splint - Left on 12/1/2022    Plan:   Patient was having some irritation of the cast  Cast was removed today  Cast precautions were reviewed  He will follow-up as scheduled for reevaluation    Follow Up:  As scheduled    To Do Next Visit:  Reevaluation      CHIEF COMPLAINT:  Chief Complaint   Patient presents with   • Left Thumb - Post-op, Cast Check         SUBJECTIVE:  Hyun Byrd  is a 75 y o  male who presents for follow up after Left thumb interpositional arthroplasty with pinning of the Metacarpophalangeal joint - Left and Application of thumb spica splint - Left on 12/1/2022  Today patient has          PHYSICAL EXAMINATION:  Vital signs: /72   Pulse 66   Ht 6' 3" (1 905 m)   Wt 122 kg (268 lb)   BMI 33 50 kg/m²   General: well developed and well nourished, alert, oriented times 3 and appears comfortable  Psychiatric: Normal    MUSCULOSKELETAL EXAMINATION:   Cast in appropriate position without evidence of damage or breakdown  Range of Motion: As expected and Limited due to patient being in a cast   He is able to move all of the other digits with mild swelling into the digits  Neurovascular status: Neuro intact, good cap refill  Activity Restrictions: Cast/splint restrictions         STUDIES REVIEWED:  No Studies to review      PROCEDURES PERFORMED:  Procedures       Scribe Attestation    I,:  Anna Capone PA-C am acting as a scribe while in the presence of the attending physician :       I,:  Stephani Cervantes MD personally performed the services described in this documentation    as scribed in my presence :

## 2023-01-09 ENCOUNTER — HOSPITAL ENCOUNTER (OUTPATIENT)
Dept: RADIOLOGY | Facility: HOSPITAL | Age: 68
Discharge: HOME | End: 2023-01-09
Attending: ORTHOPAEDIC SURGERY

## 2023-01-09 ENCOUNTER — OFFICE VISIT (OUTPATIENT)
Dept: OBGYN CLINIC | Facility: CLINIC | Age: 68
End: 2023-01-09

## 2023-01-09 VITALS
DIASTOLIC BLOOD PRESSURE: 83 MMHG | HEIGHT: 75 IN | BODY MASS INDEX: 32.95 KG/M2 | SYSTOLIC BLOOD PRESSURE: 128 MMHG | WEIGHT: 265 LBS

## 2023-01-09 DIAGNOSIS — Z47.89 AFTERCARE FOLLOWING SURGERY OF THE MUSCULOSKELETAL SYSTEM: Primary | ICD-10-CM

## 2023-01-09 DIAGNOSIS — Z47.89 AFTERCARE FOLLOWING SURGERY OF THE MUSCULOSKELETAL SYSTEM: ICD-10-CM

## 2023-01-09 NOTE — PROGRESS NOTES
Assessment:   S/P Left thumb interpositional arthroplasty with pinning of the Metacarpophalangeal joint - Left and Application of thumb spica splint - Left on 12/1/2022    Plan:   Pin was removed today without complication  Bandage was applied over pin site  Comfort Cool was provided today  He was advised no heavy lifting  He was advised to start to work on range of motion  He is to wear the Comfort Cool splint at all times except for hygiene purposes  He was advised to avoid soaking for at least another week to allow the wounds to heal  He will follow-up in 6 weeks for reevaluation    Follow Up:  6 weeks    To Do Next Visit:  Reevaluation      CHIEF COMPLAINT:  Chief Complaint   Patient presents with   • Left Thumb - Post-op     S/P Left thumb interpositional arthroplasty with pinning of the Metacarpophalangeal joint -12/1/2022         SUBJECTIVE:  Olive Leon  is a 75 y o  male who presents for follow up after Left thumb interpositional arthroplasty with pinning of the Metacarpophalangeal joint - Left and Application of thumb spica splint - Left on 12/1/2022  Today patient has minimal pain and discomfort  He is here today for pin removal        PHYSICAL EXAMINATION:  Vital signs: Wt 120 kg (265 lb)   BMI 33 12 kg/m²   General: well developed and well nourished, alert, oriented times 3 and appears comfortable  Psychiatric: Normal    MUSCULOSKELETAL EXAMINATION:  Incision: Clean, dry, intact  Range of Motion: As expected  Neurovascular status: Neuro intact, good cap refill  Activity Restrictions: Cast/splint restrictions   Pin was removed today without complication        STUDIES REVIEWED:  No Studies to review      PROCEDURES PERFORMED:  Procedures  No Procedures performed today     Scribe Attestation    I,:  Madie Tomlin PA-C am acting as a scribe while in the presence of the attending physician :       I,:  Rebecca Cooper MD personally performed the services described in this documentation    as scribed in my presence :             Scribe Attestation    I,:  Sarika Alba PA-C am acting as a scribe while in the presence of the attending physician :       I,:  Zahraa Singh MD personally performed the services described in this documentation    as scribed in my presence :

## 2023-01-12 ENCOUNTER — TELEPHONE (OUTPATIENT)
Dept: OBGYN CLINIC | Facility: CLINIC | Age: 68
End: 2023-01-12

## 2023-01-12 NOTE — TELEPHONE ENCOUNTER
----- Message from Vinayak Aly sent at 1/9/2023  5:58 PM EST -----  Regarding: PO FU appointment  Hi  Please schedule Bill for a PO/FU for his left thumb  Best Phone Number is [phone]  He is [facility] to [facility] to [facility]        Thank You

## 2023-01-16 ENCOUNTER — EVALUATION (OUTPATIENT)
Dept: OCCUPATIONAL THERAPY | Facility: CLINIC | Age: 68
End: 2023-01-16

## 2023-01-16 DIAGNOSIS — Z47.89 AFTERCARE FOLLOWING SURGERY OF THE MUSCULOSKELETAL SYSTEM: ICD-10-CM

## 2023-01-18 ENCOUNTER — OFFICE VISIT (OUTPATIENT)
Dept: OCCUPATIONAL THERAPY | Facility: CLINIC | Age: 68
End: 2023-01-18

## 2023-01-18 DIAGNOSIS — Z47.89 AFTERCARE FOLLOWING SURGERY OF THE MUSCULOSKELETAL SYSTEM: Primary | ICD-10-CM

## 2023-01-18 NOTE — PROGRESS NOTES
Daily Note     Today's date: 2023  Patient name: Cesario Mtz  : 1955  MRN: [id number]  Referring provider: ROD Linton*  Dx:   Encounter Diagnosis     ICD-10-CM    1  Aftercare following surgery of the musculoskeletal system  Z47 89                      Subjective: I am a little sore from Monday  Objective: See treatment diary below      Assessment: Tolerated treatment well  Patient has good thumb motion  Less tender over the incision  Plan: Continue per plan of care        Precautions: Weil[place] protocol 22    Manuals             IE 30'            retrograde 4m 4m           Graston L 4m 4m           STM thenar 3m 3m           Scar mob 2m 2m                        HEP- thumb A/AAROM reviewed                                                                             Ther Ex             Wrist A/AAROM 2m 2m           Wrist curls  Cone 3x10                                                                                         Ther Activity             opposition x30 x30           Peg  x30 x30           Gait Training                                       Modalities             MH L 8m 8m           CP post 5m 5m

## 2023-01-23 ENCOUNTER — OFFICE VISIT (OUTPATIENT)
Dept: OCCUPATIONAL THERAPY | Facility: CLINIC | Age: 68
End: 2023-01-23

## 2023-01-23 DIAGNOSIS — Z47.89 AFTERCARE FOLLOWING SURGERY OF THE MUSCULOSKELETAL SYSTEM: Primary | ICD-10-CM

## 2023-01-23 NOTE — PROGRESS NOTES
Daily Note     Today's date: 2023  Patient name: Liliana Mensah  : 1955  MRN: [id number]  Referring provider: ROD Clark*  Dx:   Encounter Diagnosis     ICD-10-CM    1  Aftercare following surgery of the musculoskeletal system  Z47 89                      Subjective: I feel good  Objective: See treatment diary below      Assessment: Tolerated treatment well  Patient making steady progress with tx  Continues with mild edema in the hand  Plan: Continue per plan of care        Precautions: Weil[place] protocol 22    Manuals            IE 30'            retrograde 4m 4m 4m          Graston L 4m 4m 4m          STM thenar 3m 3m 3m          Scar mob 2m 2m 2m                       HEP- thumb A/AAROM reviewed                                                                             Ther Ex             Wrist A/AAROM 2m 2m 2m          Wrist curls  Cone 3x10 Cone 3x10          Clips w  rotation   Y 2 sets                                                                           Ther Activity             opposition x30 x30 x30          Peg  x30 x30 x30          Gait Training                                       Modalities             MH L 8m 8m 8m          CP post 5m 5m 5m

## 2023-01-25 ENCOUNTER — OFFICE VISIT (OUTPATIENT)
Dept: OCCUPATIONAL THERAPY | Facility: CLINIC | Age: 68
End: 2023-01-25

## 2023-01-25 DIAGNOSIS — Z47.89 AFTERCARE FOLLOWING SURGERY OF THE MUSCULOSKELETAL SYSTEM: Primary | ICD-10-CM

## 2023-01-25 NOTE — PROGRESS NOTES
Daily Note     Today's date: 2023  Patient name: Karlene Shi  : 1955  MRN: [id number]  Referring provider: ROD Christian*  Dx:   Encounter Diagnosis     ICD-10-CM    1  Aftercare following surgery of the musculoskeletal system  Z47 89                      Subjective: I held a golf club the other day  Objective: See treatment diary below      Assessment: Tolerated treatment well  Patient tolerating light resistance without complaint  Plan: Continue per plan of care        Precautions: Weil[place] protocol 22    Manuals           IE 30'            retrograde 4m 4m 4m 4m         Graston L 4m 4m 4m 4m         STM thenar 3m 3m 3m 3m         Scar mob 2m 2m 2m 2m                      HEP- thumb A/AAROM reviewed                                                                             Ther Ex             Wrist A/AAROM 2m 2m 2m 2m         Wrist curls  Cone 3x10 Cone 3x10 #1 3x10         Clips w  rotation   Y 2 sets Y 3 sets         P/s AROM    SM 3x10                                                             Ther Activity             opposition x30 x30 x30 Bundy pegs         Peg  x30 x30 x30          Gait Training                                       Modalities             MH L 8m 8m 8m 8m         CP post 5m 5m 5m 5m

## 2023-01-30 ENCOUNTER — OFFICE VISIT (OUTPATIENT)
Dept: OCCUPATIONAL THERAPY | Facility: CLINIC | Age: 68
End: 2023-01-30

## 2023-01-30 DIAGNOSIS — Z47.89 AFTERCARE FOLLOWING SURGERY OF THE MUSCULOSKELETAL SYSTEM: Primary | ICD-10-CM

## 2023-01-30 NOTE — PROGRESS NOTES
Daily Note     Today's date: 2023  Patient name: Annmarie South  : 1955  MRN: [id number]  Referring provider: ROD Lujan*  Dx:   Encounter Diagnosis     ICD-10-CM    1  Aftercare following surgery of the musculoskeletal system  Z47 89                      Subjective: My finger is still swollen  Objective: See treatment diary below      Assessment: Tolerated treatment well  Patient making progress with functional strength  Plan: Continue per plan of care        Precautions: Weil[place] protocol 22    Manuals          IE 30'            retrograde 4m 4m 4m 4m 4m        Graston L 4m 4m 4m 4m 4m        STM thenar 3m 3m 3m 3m 3m        Scar mob 2m 2m 2m 2m 2m                     HEP- thumb A/AAROM reviewed                                                                             Ther Ex             Wrist A/AAROM 2m 2m 2m 2m 2m        Wrist curls  Cone 3x10 Cone 3x10 #1 3x10 #2 3x10        Clips w  rotation   Y 2 sets Y 3 sets R 3sets        P/s AROM    SM 3x10 SM 3x10        Ball lifts     Y 3x10                                               Ther Activity             opposition x30 x30 x30 Bundy pegs         Peg  x30 x30 x30          Gait Training                                       Modalities             MH L 8m 8m 8m 8m 8m        CP post 5m 5m 5m 5m 5m

## 2023-02-01 ENCOUNTER — OFFICE VISIT (OUTPATIENT)
Dept: OCCUPATIONAL THERAPY | Facility: CLINIC | Age: 68
End: 2023-02-01

## 2023-02-01 DIAGNOSIS — Z47.89 AFTERCARE FOLLOWING SURGERY OF THE MUSCULOSKELETAL SYSTEM: Primary | ICD-10-CM

## 2023-02-01 NOTE — PROGRESS NOTES
Daily Note     Today's date: 2023  Patient name: Alana Graham  : 1955  MRN: [id number]  Referring provider: ROD Boyer*  Dx:   Encounter Diagnosis     ICD-10-CM    1  Aftercare following surgery of the musculoskeletal system  Z47 89                      Subjective: My hand was a little sore last visit, but not too bad  Objective: See treatment diary below      Assessment: Tolerated treatment well  Patient has mild soreness with activities  Plan: Continue per plan of care        Precautions: Weil[place] protocol 22    Manuals         IE 30'            retrograde 4m 4m 4m 4m 4m 4m       Graston L 4m 4m 4m 4m 4m 4m       STM thenar 3m 3m 3m 3m 3m 3m       Scar mob 2m 2m 2m 2m 2m 2m                    HEP- thumb A/AAROM reviewed                                                                             Ther Ex             Wrist A/AAROM 2m 2m 2m 2m 2m 2m       Wrist curls  Cone 3x10 Cone 3x10 #1 3x10 #2 3x10 #2 3x10       Clips w  rotation   Y 2 sets Y 3 sets R 3sets R 3 sets       P/s AROM    SM 3x10 SM 3x10 SM 3x10       Ball lifts     Y 3x10 Y 3x10       gripping      GPW 2x30                                 Ther Activity             opposition x30 x30 x30 Bundy pegs         Peg  x30 x30 x30          Gait Training                                       Modalities             MH L 8m 8m 8m 8m 8m 8m       CP post 5m 5m 5m 5m 5m 5m

## 2023-02-06 ENCOUNTER — OFFICE VISIT (OUTPATIENT)
Dept: OCCUPATIONAL THERAPY | Facility: CLINIC | Age: 68
End: 2023-02-06

## 2023-02-06 DIAGNOSIS — Z47.89 AFTERCARE FOLLOWING SURGERY OF THE MUSCULOSKELETAL SYSTEM: Primary | ICD-10-CM

## 2023-02-06 NOTE — PROGRESS NOTES
Daily Note     Today's date: 2023  Patient name: Kell Ford  : 1955  MRN: [id number]  Referring provider: ROD Richardson*  Dx:   Encounter Diagnosis     ICD-10-CM    1  Aftercare following surgery of the musculoskeletal system  Z47 89                      Subjective: It is feeling better  Objective: See treatment diary below      Assessment: Tolerated treatment well  Patient preogressing with POC upgrades  Plan: Continue per plan of care        Precautions: Weil[place] protocol 22    Manuals        IE 30'            retrograde 4m 4m 4m 4m 4m 4m 4m      Graston L 4m 4m 4m 4m 4m 4m 4m      STM thenar 3m 3m 3m 3m 3m 3m 3m      Scar mob 2m 2m 2m 2m 2m 2m 2m                   HEP- thumb A/AAROM reviewed                                                                             Ther Ex             Wrist A/AAROM 2m 2m 2m 2m 2m 2m 2m      Wrist curls  Cone 3x10 Cone 3x10 #1 3x10 #2 3x10 #2 3x10 #3 3x10      Clips w  rotation   Y 2 sets Y 3 sets R 3sets R 3 sets G 3 sets      P/s AROM    SM 3x10 SM 3x10 SM 3x10       Ball lifts     Y 3x10 Y 3x10 R 3x10      gripping      GPW 2x30 GPW 2x30      velcroboard- pinch, wrist       x6 each                   Ther Activity             opposition x30 x30 x30 Bundy pegs         Peg  x30 x30 x30          Gait Training                                       Modalities             MH L 8m 8m 8m 8m 8m 8m 8m      CP post 5m 5m 5m 5m 5m 5m 5m

## 2023-02-08 ENCOUNTER — OFFICE VISIT (OUTPATIENT)
Dept: OCCUPATIONAL THERAPY | Facility: CLINIC | Age: 68
End: 2023-02-08

## 2023-02-08 DIAGNOSIS — Z47.89 AFTERCARE FOLLOWING SURGERY OF THE MUSCULOSKELETAL SYSTEM: Primary | ICD-10-CM

## 2023-02-08 NOTE — PROGRESS NOTES
Daily Note     Today's date: 2023  Patient name: Acosta Rodriguez  : 1955  MRN: [id number]  Referring provider: ROD Fair*  Dx:   Encounter Diagnosis     ICD-10-CM    1  Aftercare following surgery of the musculoskeletal system  Z47 89                      Subjective:  No new complaints      Objective: See treatment diary below      Assessment: Tolerated treatment well  Patient progressing well with tx  Upgrading POC to address functional strength for return to golf  Plan: Continue per plan of care        Precautions: Weil[place] protocol 22    Manuals       IE 30'            retrograde 4m 4m 4m 4m 4m 4m 4m 4m     Graston L 4m 4m 4m 4m 4m 4m 4m 4m     STM thenar 3m 3m 3m 3m 3m 3m 3m 3m     Scar mob 2m 2m 2m 2m 2m 2m 2m 2m                  HEP- thumb A/AAROM reviewed                                                                             Ther Ex             Wrist A/AAROM 2m 2m 2m 2m 2m 2m 2m 2m     Wrist curls  Cone 3x10 Cone 3x10 #1 3x10 #2 3x10 #2 3x10 #3 3x10 #3 3x10     Clips w  rotation   Y 2 sets Y 3 sets R 3sets R 3 sets G 3 sets G 3 sets     P/s AROM    SM 3x10 SM 3x10 SM 3x10       Ball lifts     Y 3x10 Y 3x10 R 3x10 R 3x10     gripping      GPW 2x30 GPW 2x30 BPW 2x30     velcroboard- pinch, wrist       x6 each x6 each                  Ther Activity             opposition x30 x30 x30 Bundy pegs         Peg  x30 x30 x30          Gait Training                                       Modalities             MH L 8m 8m 8m 8m 8m 8m 8m 8m     CP post 5m 5m 5m 5m 5m 5m 5m 5m

## 2023-02-13 ENCOUNTER — OFFICE VISIT (OUTPATIENT)
Dept: OCCUPATIONAL THERAPY | Facility: CLINIC | Age: 68
End: 2023-02-13

## 2023-02-13 DIAGNOSIS — Z47.89 AFTERCARE FOLLOWING SURGERY OF THE MUSCULOSKELETAL SYSTEM: Primary | ICD-10-CM

## 2023-02-13 NOTE — PROGRESS NOTES
Daily Note     Today's date: 2023  Patient name: Korina Lepe  : 1955  MRN: [id number]  Referring provider: ROD Martin*  Dx:   Encounter Diagnosis     ICD-10-CM    1  Aftercare following surgery of the musculoskeletal system  Z47 89                      Subjective:  I don't have pain  Objective: See treatment diary below      Assessment: Tolerated treatment well  Patient progressing well with tx  Tolerating upgraded resistive therex weekly  He remains swollen in the MCPs of the L hand  No report of pain in the joints of the hand with activity  Plan: Continue per plan of care        Precautions: Weil[place] protocol 22    Manuals      IE 30'            retrograde 4m 4m 4m 4m 4m 4m 4m 4m 4m    Graston L 4m 4m 4m 4m 4m 4m 4m 4m 4m    STM thenar 3m 3m 3m 3m 3m 3m 3m 3m 3m    Scar mob 2m 2m 2m 2m 2m 2m 2m 2m 2m                 HEP- thumb A/AAROM reviewed                                                                             Ther Ex             Wrist A/AAROM 2m 2m 2m 2m 2m 2m 2m 2m 2m    Wrist curls  Cone 3x10 Cone 3x10 #1 3x10 #2 3x10 #2 3x10 #3 3x10 #3 3x10 #4 3x10    Clips w  rotation   Y 2 sets Y 3 sets R 3sets R 3 sets G 3 sets G 3 sets G 3sets    P/s AROM    SM 3x10 SM 3x10 SM 3x10       Ball lifts     Y 3x10 Y 3x10 R 3x10 R 3x10 R 3x10    gripping      GPW 2x30 GPW 2x30 BPW 2x30 BPW 2x30    velcroboard- pinch, wrist       x6 each x6 each x6 each                 Ther Activity             opposition x30 x30 x30 Bundy pegs         Peg  x30 x30 x30          Gait Training                                       Modalities             MH L 8m 8m 8m 8m 8m 8m 8m 8m 8m    CP post 5m 5m 5m 5m 5m 5m 5m 5m 5m

## 2023-02-15 ENCOUNTER — OFFICE VISIT (OUTPATIENT)
Dept: OCCUPATIONAL THERAPY | Facility: CLINIC | Age: 68
End: 2023-02-15

## 2023-02-15 DIAGNOSIS — Z47.89 AFTERCARE FOLLOWING SURGERY OF THE MUSCULOSKELETAL SYSTEM: Primary | ICD-10-CM

## 2023-02-15 NOTE — PROGRESS NOTES
Daily Note     Today's date: 2/15/2023  Patient name: Alana Graham  : 1955  MRN: [id number]  Referring provider: ROD Boyer*  Dx:   Encounter Diagnosis     ICD-10-CM    1  Aftercare following surgery of the musculoskeletal system  Z47 89                      Subjective:  No new complaints  Objective: See treatment diary below      Assessment: Tolerated treatment well  Patient progressing well with tx  Making steady progress with TherEx upgrades  Plan: Continue per plan of care        Precautions: [place]il[place] protocol 22    Manuals 1/16 1/18 1/23 1/25 1/30 2/1 2/6 2/8 2/13 2/15    IE 30'            retrograde 4m 4m 4m 4m 4m 4m 4m 4m 4m 4m   Graston L 4m 4m 4m 4m 4m 4m 4m 4m 4m 4m   STM thenar 3m 3m 3m 3m 3m 3m 3m 3m 3m 3m   Scar mob 2m 2m 2m 2m 2m 2m 2m 2m 2m 2m                HEP- thumb A/AAROM reviewed                                                                             Ther Ex             Wrist A/AAROM 2m 2m 2m 2m 2m 2m 2m 2m 2m 2m   Wrist curls  Cone 3x10 Cone 3x10 #1 3x10 #2 3x10 #2 3x10 #3 3x10 #3 3x10 #4 3x10 #4 3x10   Clips w  rotation   Y 2 sets Y 3 sets R 3sets R 3 sets G 3 sets G 3 sets G 3sets G 3 sets   P/s AROM    SM 3x10 SM 3x10 SM 3x10       Ball lifts     Y 3x10 Y 3x10 R 3x10 R 3x10 R 3x10 G 3x10   gripping      GPW 2x30 GPW 2x30 BPW 2x30 BPW 2x30 BPW 2x30   velcroboard- pinch, wrist       x6 each x6 each x6 each x6 each                Ther Activity             opposition x30 x30 x30 Bundy pegs         Peg  x30 x30 x30          Gait Training                                       Modalities             MH L 8m 8m 8m 8m 8m 8m 8m 8m 8m 8m   CP post 5m 5m 5m 5m 5m 5m 5m 5m 5m 5m

## 2023-02-20 ENCOUNTER — OFFICE VISIT (OUTPATIENT)
Dept: OCCUPATIONAL THERAPY | Facility: CLINIC | Age: 68
End: 2023-02-20

## 2023-02-20 DIAGNOSIS — Z47.89 AFTERCARE FOLLOWING SURGERY OF THE MUSCULOSKELETAL SYSTEM: Primary | ICD-10-CM

## 2023-02-20 NOTE — PROGRESS NOTES
Daily Note     Today's date: 2023  Patient name: Aracely Figueroa  : 1955  MRN: [id number]  Referring provider: ROD Zimmer*  Dx:   Encounter Diagnosis     ICD-10-CM    1  Aftercare following surgery of the musculoskeletal system  Z47 89                      Subjective:  I was putting around last week, it felt good  Objective: See treatment diary below      Assessment: Tolerated treatment well  Patient has improved function  He was able to putt and hit golf balls this past week without complaint  Plan: Continue per plan of care        Precautions: [place] protocol 22     Manuals 2/20         2/15                retrograde 4m         4m   Graston L 4m         4m   STM thenar 3m         3m   Scar mob 2m         2m                HEP- thumb A/AAROM                                                                              Ther Ex             Wrist A/AAROM 2m         2m   Wrist curls #4 3x10         #4 3x10   Clips w  rotation B 3 sets         G 3 sets   P/s AROM             Ball lifts G 3x10         G 3x10   gripping BPW 2x30         BPW 2x30   velcroboard- pinch, wrist x6 each         x6 each   Handhelper w/ foam 1 set            Ther Activity             opposition             Peg              Gait Training                                       Modalities             MH L 8m         8m   CP post 5m         5m         Manuals 1/16 1/18 1/23 1/25 1/30 2/1 2/6 2/8 2/13 2/15    IE 30'            retrograde 4m 4m 4m 4m 4m 4m 4m 4m 4m 4m   Graston L 4m 4m 4m 4m 4m 4m 4m 4m 4m 4m   STM thenar 3m 3m 3m 3m 3m 3m 3m 3m 3m 3m   Scar mob 2m 2m 2m 2m 2m 2m 2m 2m 2m 2m                HEP- thumb A/AAROM reviewed                                                                             Ther Ex             Wrist A/AAROM 2m 2m 2m 2m 2m 2m 2m 2m 2m 2m   Wrist curls  Cone 3x10 Cone 3x10 #1 3x10 #2 3x10 #2 3x10 #3 3x10 #3 3x10 #4 3x10 #4 3x10   Clips w  rotation   Y 2 sets Y 3 sets R 3sets R 3 sets G 3 sets G 3 sets G 3sets G 3 sets   P/s AROM    SM 3x10 SM 3x10 SM 3x10       Ball lifts     Y 3x10 Y 3x10 R 3x10 R 3x10 R 3x10 G 3x10   gripping      GPW 2x30 GPW 2x30 BPW 2x30 BPW 2x30 BPW 2x30   velcroboard- pinch, wrist       x6 each x6 each x6 each x6 each                Ther Activity             opposition x30 x30 x30 Bundy pegs         Peg  x30 x30 x30          Gait Training                                       Modalities             MH L 8m 8m 8m 8m 8m 8m 8m 8m 8m 8m   CP post 5m 5m 5m 5m 5m 5m 5m 5m 5m 5m

## 2023-02-22 ENCOUNTER — OFFICE VISIT (OUTPATIENT)
Dept: OCCUPATIONAL THERAPY | Facility: CLINIC | Age: 68
End: 2023-02-22

## 2023-02-22 DIAGNOSIS — Z47.89 AFTERCARE FOLLOWING SURGERY OF THE MUSCULOSKELETAL SYSTEM: Primary | ICD-10-CM

## 2023-02-22 NOTE — PROGRESS NOTES
Daily Note     Today's date: 2023  Patient name: Kolby Sumner  : 1955  MRN: [id number]  Referring provider: ROD Bailey*  Dx:   Encounter Diagnosis     ICD-10-CM    1  Aftercare following surgery of the musculoskeletal system  Z47 89                      Subjective:  My hand feels hypersensitive to touching things  Objective: See treatment diary below      Assessment: Tolerated treatment well  Patient has improved function  Pt  Making steady gains with L hand strength,  No complaint of pain with function  Plan: Continue per plan of care        Precautions: Weil[place] protocol 22     Manuals 2/20 2/22        2/15                retrograde 4m 4m        4m   Graston L 4m 4m        4m   STM thenar 3m 3m        3m   Scar mob 2m 2m        2m                HEP- thumb A/AAROM                                                                              Ther Ex             Wrist A/AAROM 2m 2m        2m   Wrist curls #4 3x10 #4 3x10        #4 3x10   Clips w  rotation B 3 sets         G 3 sets   P/s AROM             Ball lifts G 3x10 G 3x10        G 3x10   gripping BPW 2x30 BPW 2x30        BPW 2x30   velcroboard- pinch, wrist x6 each x6        x6 each   Handhelper w/ foam 1 set 1 set           Ther Activity             opposition             Peg              Gait Training                                       Modalities             MH L 8m 8m        8m   CP post 5m         5m         Manuals 1/16 1/18 1/23 1/25 1/30 2/1 2/6 2/8 2/13 2/15    IE 30'            retrograde 4m 4m 4m 4m 4m 4m 4m 4m 4m 4m   Graston L 4m 4m 4m 4m 4m 4m 4m 4m 4m 4m   STM thenar 3m 3m 3m 3m 3m 3m 3m 3m 3m 3m   Scar mob 2m 2m 2m 2m 2m 2m 2m 2m 2m 2m                HEP- thumb A/AAROM reviewed                                                                             Ther Ex             Wrist A/AAROM 2m 2m 2m 2m 2m 2m 2m 2m 2m 2m   Wrist curls  Cone 3x10 Cone 3x10 #1 3x10 #2 3x10 #2 3x10 #3 3x10 #3 3x10 #4 3x10 #4 3x10   Clips w  rotation   Y 2 sets Y 3 sets R 3sets R 3 sets G 3 sets G 3 sets G 3sets G 3 sets   P/s AROM    SM 3x10 SM 3x10 SM 3x10       Ball lifts     Y 3x10 Y 3x10 R 3x10 R 3x10 R 3x10 G 3x10   gripping      GPW 2x30 GPW 2x30 BPW 2x30 BPW 2x30 BPW 2x30   velcroboard- pinch, wrist       x6 each x6 each x6 each x6 each                Ther Activity             opposition x30 x30 x30 Bundy pegs         Peg  x30 x30 x30          Gait Training                                       Modalities             MH L 8m 8m 8m 8m 8m 8m 8m 8m 8m 8m   CP post 5m 5m 5m 5m 5m 5m 5m 5m 5m 5m

## 2023-02-27 ENCOUNTER — OFFICE VISIT (OUTPATIENT)
Dept: OCCUPATIONAL THERAPY | Facility: CLINIC | Age: 68
End: 2023-02-27

## 2023-02-27 ENCOUNTER — OFFICE VISIT (OUTPATIENT)
Dept: OBGYN CLINIC | Facility: CLINIC | Age: 68
End: 2023-02-27

## 2023-02-27 VITALS
BODY MASS INDEX: 35.59 KG/M2 | WEIGHT: 286.2 LBS | HEIGHT: 75 IN | HEART RATE: 56 BPM | DIASTOLIC BLOOD PRESSURE: 90 MMHG | SYSTOLIC BLOOD PRESSURE: 156 MMHG

## 2023-02-27 DIAGNOSIS — M18.12 PRIMARY OSTEOARTHRITIS OF FIRST CARPOMETACARPAL JOINT OF LEFT HAND: Primary | ICD-10-CM

## 2023-02-27 DIAGNOSIS — Z47.89 AFTERCARE FOLLOWING SURGERY OF THE MUSCULOSKELETAL SYSTEM: Primary | ICD-10-CM

## 2023-02-27 NOTE — PROGRESS NOTES
Daily Note     Today's date: 2023  Patient name: Lakia Andrade  : 1955  MRN: [id number]  Referring provider: ROD Grullon*  Dx:   Encounter Diagnosis     ICD-10-CM    1  Aftercare following surgery of the musculoskeletal system  Z47 89                      Subjective:  I played 18 holes, it felt good  Objective: See treatment diary below      Assessment: Tolerated treatment well  Patient has resumed all ADLs and has played a full game of golf without complaint  His  strength is 20lbs L (R 40lbs), and pinch strength is 11lbs L (14lbs R)  He does not have pain with function  Pt  Discharged from MD and will continue with strengthening routine at home  Goals have been met with therapy,       Goals  STG( 6 visits)  1  Compliant with HEP/protocol- met  2  Reduce edema to [facility] for improved motion- met  3  Full opposition for function  - met  LTG( 12 visits or discharge)  1  Improve FOTO score to predicted outcome or greater - met  2  Hold golf club pain free for RT sport  - met  3  L /pinch strength WNLs for ADLs  - met      Plan: D/C from OT       Precautions: Weilby protocol 22     Manuals 2/20 2/22 2/27       2/15                retrograde 4m 4m 4m       4m   Graston L 4m 4m 4m       4m   STM thenar 3m 3m 3m       3m   Scar mob 2m 2m 2m       2m                HEP- thumb A/AAROM                                                                              Ther Ex             Wrist A/AAROM 2m 2m 2m       2m   Wrist curls #4 3x10 #4 3x10 #5 3x10       #4 3x10   Clips w  rotation B 3 sets         G 3 sets   P/s AROM             Ball lifts G 3x10 G 3x10 G 3x10       G 3x10   gripping BPW 2x30 BPW 2x30 BPW 2x30       BPW 2x30   velcroboard- pinch, wrist x6 each x6 x6       x6 each   Handhelper w/ foam 1 set 1 set 1 set          Ther Activity             opposition             Peg              Gait Training                                       Modalities             MH L 8m 8m 8m       8m   CP post 5m         5m         Manuals 1/16 1/18 1/23 1/25 1/30 2/1 2/6 2/8 2/13 2/15    IE 30'            retrograde 4m 4m 4m 4m 4m 4m 4m 4m 4m 4m   Graston L 4m 4m 4m 4m 4m 4m 4m 4m 4m 4m   STM thenar 3m 3m 3m 3m 3m 3m 3m 3m 3m 3m   Scar mob 2m 2m 2m 2m 2m 2m 2m 2m 2m 2m                HEP- thumb A/AAROM reviewed                                                                             Ther Ex             Wrist A/AAROM 2m 2m 2m 2m 2m 2m 2m 2m 2m 2m   Wrist curls  Cone 3x10 Cone 3x10 #1 3x10 #2 3x10 #2 3x10 #3 3x10 #3 3x10 #4 3x10 #4 3x10   Clips w  rotation   Y 2 sets Y 3 sets R 3sets R 3 sets G 3 sets G 3 sets G 3sets G 3 sets   P/s AROM    SM 3x10 SM 3x10 SM 3x10       Ball lifts     Y 3x10 Y 3x10 R 3x10 R 3x10 R 3x10 G 3x10   gripping      GPW 2x30 GPW 2x30 BPW 2x30 BPW 2x30 BPW 2x30   velcroboard- pinch, wrist       x6 each x6 each x6 each x6 each                Ther Activity             opposition x30 x30 x30 Bundy pegs         Peg  x30 x30 x30          Gait Training                                       Modalities             MH L 8m 8m 8m 8m 8m 8m 8m 8m 8m 8m   CP post 5m 5m 5m 5m 5m 5m 5m 5m 5m 5m

## 2023-02-27 NOTE — PROGRESS NOTES
Assessment:   S/P Left thumb interpositional arthroplasty with pinning of the Metacarpophalangeal joint - Left and Application of thumb spica splint - Left on 12/1/2022    Plan:   Patient is doing well and has been working on his strengthening  He offers no complaints at this time  He was encouraged to continue to work on his range of motion and strengthening  He can resume his activities of daily living  He will follow-up with us as needed    Follow Up:  PRN    To Do Next Visit:  Reevaluation      CHIEF COMPLAINT:  Chief Complaint   Patient presents with   • Left Thumb - Post-op         SUBJECTIVE:  Colleen Foley  is a 75 y o  male who presents for follow up after Left thumb interpositional arthroplasty with pinning of the Metacarpophalangeal joint - Left and Application of thumb spica splint - Left on 12/1/2022  Today patient has no complaints at this time  He states he was able to play some golf over the weekend without pain or discomfort         PHYSICAL EXAMINATION:  Vital signs: /90   Pulse 56   Ht 6' 3" (1 905 m)   Wt 130 kg (286 lb 3 2 oz)   BMI 35 77 kg/m²   General: well developed and well nourished, alert, oriented times 3 and appears comfortable  Psychiatric: Normal    MUSCULOSKELETAL EXAMINATION:  Incision: clean, dry and healed  Range of Motion: As expected, opposition intact and full composite fist possible  Neurovascular status: Neuro intact, good cap refill  Activity Restrictions: No restrictions         STUDIES REVIEWED:  No Studies to review      PROCEDURES PERFORMED:  Procedures  No Procedures performed today

## 2023-05-03 ENCOUNTER — OFFICE VISIT (OUTPATIENT)
Dept: PAIN MEDICINE | Facility: CLINIC | Age: 68
End: 2023-05-03

## 2023-05-03 VITALS
BODY MASS INDEX: 35.43 KG/M2 | HEIGHT: 75 IN | WEIGHT: 285 LBS | TEMPERATURE: 98.4 F | SYSTOLIC BLOOD PRESSURE: 130 MMHG | HEART RATE: 62 BPM | DIASTOLIC BLOOD PRESSURE: 82 MMHG

## 2023-05-03 DIAGNOSIS — M47.812 CERVICAL SPONDYLOSIS: Primary | ICD-10-CM

## 2023-05-03 DIAGNOSIS — M54.2 NECK PAIN: ICD-10-CM

## 2023-05-03 NOTE — LETTER
May 3, 2023     Milady Godoy MD  [facility] [address]    Patient: Vel Watts  YOB: 1955   Date of Visit: 5/3/2023       Dear Dr Leverne Hashimoto: Thank you for referring Toma Roberts to me for evaluation  Below are my notes for this consultation  If you have questions, please do not hesitate to call me  I look forward to following your patient along with you  Sincerely,        Leigh Root DO        CC: No Recipients  Leigh Root DO  5/3/2023  8:48 AM  Signed  Assessment  1  Cervical spondylosis    2  Neck pain        Plan  The Patient's neck pain persists despite time, relative rest, activity modification and therapy  As the patient previously benefited from cervical rhizotomy which lasted approximately three years I think it is reasonable to pursue one diagnostic medial branch block using bupivacaine 0 75%  If he demonstrates an appropriate response he be candidate for the radiofrequency ablation  In the office today, we reviewed the nature of the patient's pathology in depth using  diagrams and models  I discussed the approach we would use for the medial branch block and provided literature for home review  The patient understands the risks associated with the procedure including bleeding, infection, tissue injury, allergic reaction and paralysis and provided written and verbal consent in the office today      My impressions and treatment recommendations were discussed in detail with the patient who verbalized understanding and had no further questions  Discharge instructions were provided  I personally saw and examined the patient and I agree with the above discussed plan of care  This note is created using dictation transcription  It may contain typographical errors, grammatical errors, improperly dictated words, background noise and other errors      Orders Placed This Encounter   Procedures    FL spine and pain procedure     Standing Status: Future     Standing Expiration Date:   5/3/2027     Order Specific Question:   Reason for Exam:     Answer:   Left C4,5,6,7 MBB with 0 75% bupiv     Order Specific Question:   Anticoagulant hold needed? Answer:   no     No orders of the defined types were placed in this encounter  History of Present Illness    Ilene Gil  is a 75 y o  male last treated with a left cervical rhizotomy in 2018  He was doing well but his pain returned similar nature in character  He denies any significant changes to his health is reviewed today in the office  His pain is 10 out of 10 the visual analog scale left side of his neck he denies any radiation or upper extremity weakness  His pain is worse in the morning in the evening described intermittent burning sharp with a shooting sensation  His pain significant x-rays of the test living activities  I have personally reviewed and/or updated the patient's past medical history, past surgical history, family history, social history, current medications, allergies, and vital signs today  Review of Systems   Constitutional: Negative for fever and unexpected weight change  HENT: Negative for trouble swallowing  Eyes: Negative for visual disturbance  Respiratory: Negative for shortness of breath and wheezing  Cardiovascular: Negative for chest pain and palpitations  Gastrointestinal: Negative for constipation, diarrhea, nausea and vomiting  Endocrine: Negative for cold intolerance, heat intolerance and polydipsia  Genitourinary: Negative for difficulty urinating and frequency  Musculoskeletal: Negative for arthralgias, gait problem, joint swelling and myalgias  Skin: Negative for rash  Neurological: Negative for dizziness, seizures, syncope, weakness and headaches  Hematological: Does not bruise/bleed easily  Psychiatric/Behavioral: Negative for dysphoric mood  All other systems reviewed and are negative        Patient Active Problem List Diagnosis    Cervical spondylosis    Neck pain    Chronic pain syndrome    Gastroesophageal reflux disease without esophagitis    Family history of colon cancer    History of colon polyps    A-fib ([address] )    HTN (hypertension)    Hyperlipidemia    Obesity (BMI 30 0-34  9)    Anxiety    Acute vascular insufficiency of intestine (HCC)    Arthritis    Hyponatremia    Hypoxemia    Idiopathic osteoarthritis    Insomnia    Intra-abdominal hernia    MARY (obstructive sleep apnea)    Umbilical hernia    CPAP (continuous positive airway pressure) dependence    Arthritis of carpometacarpal (CMC) joint of left thumb       Past Medical History:   Diagnosis Date    Anxiety     Atrial fibrillation (HCC)     Colon polyp     CPAP (continuous positive airway pressure) dependence     Has not been using    GERD (gastroesophageal reflux disease)     Hypercholesterolemia     Hypertension     Sleep apnea     not using CPAP       Past Surgical History:   Procedure Laterality Date    ABDOMINAL SURGERY      CAST APPLICATION Left 21/3/3438    Procedure: Application of thumb spica splint;  Surgeon: Ricky Hearn MD;  Location: [facility];  Service: Orthopedics    COLONOSCOPY      July 2019:  Sessile serrated adenoma  June 2014, hyperplastic polyps and internal hemorrhoids    ESOPHAGOGASTRODUODENOSCOPY      Irregular Z-line and gastritis biopsies negative for H pylori or Newton's    HYDROCELE EXCISION / REPAIR      DC ARTHRP INTERPOS INTERCARPAL/METACARPAL JOINTS Right 7/14/2022    Procedure: right thumb interpositional arthroplasty;  Surgeon: Ricky Hearn MD;  Location: [facility];  Service: Orthopedics    DC ARTHRP INTERPOS INTERCARPAL/METACARPAL JOINTS Left 12/1/2022    Procedure: Left thumb interpositional arthroplasty with pinning of the Metacarpophalangeal joint;  Surgeon: Ricky Hearn MD;  Location: [facility];  Service: Orthopedics    UMBILICAL HERNIA REPAIR      UPPER "GASTROINTESTINAL ENDOSCOPY      October 2020: Irregular Z-line and gastritis, biopsies negative for EOE, Newton's, H pylori  2008:  Negative for mass ulcer or Newton's    VOLVULUS REDUCTION  2015       Family History   Problem Relation Age of Onset    Breast cancer Mother     Esophageal cancer Father     Cancer Father         Esophageal    Diabetes Family     Breast cancer Family     Esophageal cancer Family     Colon cancer Family     Colon cancer Brother     Colon cancer Brother     Diabetes Brother        Social History     Occupational History    Not on file   Tobacco Use    Smoking status: Never    Smokeless tobacco: Never   Vaping Use    Vaping Use: Never used   Substance and Sexual Activity    Alcohol use: Not Currently     Comment: 2-3 daily, red wine or scotch    Drug use: No    Sexual activity: Not Currently     Partners: Female       Current Outpatient Medications on File Prior to Visit   Medication Sig    acetaminophen (TYLENOL) 650 mg CR tablet Take 1 tablet (650 mg total) by mouth every 8 (eight) hours as needed for mild pain    amLODIPine (NORVASC) 10 mg tablet Take 10 mg by mouth daily    esomeprazole (NexIUM) 40 MG capsule Take 1 capsule (40 mg total) by mouth daily    losartan-hydrochlorothiazide (HYZAAR) 100-25 MG per tablet Take 1 tablet by mouth daily    metoprolol tartrate (LOPRESSOR) 50 mg tablet Take 50 mg by mouth every 12 (twelve) hours    PARoxetine (PAXIL) 20 mg tablet Take 20 mg by mouth daily    pravastatin (PRAVACHOL) 40 mg tablet Take 40 mg by mouth daily    rivaroxaban (XARELTO) 20 mg tablet Take 20 mg by mouth daily with breakfast     No current facility-administered medications on file prior to visit         No Known Allergies    Physical Exam    /82 (BP Location: Left arm, Patient Position: Sitting, Cuff Size: Standard)   Pulse 62   Temp 98 4 °F (36 9 °C)   Ht 6' 3\" (1 905 m)   Wt 129 kg (285 lb)   BMI 35 62 kg/m²     Constitutional: normal, " well developed, well nourished, alert, in no distress and non-toxic and no overt pain behavior  and obese  Eyes: anicteric  HEENT: grossly intact  Neck: supple, symmetric, trachea midline and no masses   Pulmonary:even and unlabored  Cardiovascular:No edema or pitting edema present  Skin:Normal without rashes or lesions and well hydrated  Psychiatric:Mood and affect appropriate  Neurologic:Cranial Nerves II-XII grossly intact  Musculoskeletal:normal Inspection:  Normal station and gait  Normal cervical curves and head posture  Skin intact without erythema  No sensory loss  There is no atrophy  Palpation:  There is tenderness to palpation overlying the left cervical paraspinals, cervical facet joints  There is no tenderness over the upper trapezius muscles bilateral  No shoulder tenderness  Motor/Strength:  5/5 strength in the bilateral upper extremities  Reflexes:  equal and symmetric in the upper limbs  Sensation:   Sensation intact to light touch and pinprick in the upper limbs  Maneuvers:  Negative Spurling's maneuver  Negative Lhermitte's sign  Positive cervical facet loading on the left with pain with neck extension    Imaging  CERVICAL SPINE @  6-5-18     INDICATION:   M54 2: Cervicalgia    Neck and left shoulder pain for 4 years getting worse      COMPARISON:  None     VIEWS:  XR SPINE CERVICAL COMPLETE 4 OR 5 VW NON INJURY         FINDINGS:     No evidence of fracture       Straightening of the cervical lordosis may be related to patient positioning or muscle spasm      Multilevel disc space narrowing most severe at C5-C6 and C6-C7 with associated anterior marginal osteophyte formation       The neural foramina are patent      The prevertebral soft tissues are within normal limits        The lung apices are clear      IMPRESSION:     Straightening of the cervical lordosis may be related to patient positioning or muscle spasm      Multilevel disc space narrowing most severe at C5-C6 and C6-C7 with associated anterior marginal osteophyte formation  Consider MRI  given the chronicity of symptoms  I have personally reviewed pertinent films in PACS and my interpretation is Multilevel cervical spondylosis

## 2023-05-03 NOTE — PROGRESS NOTES
Assessment  1  Cervical spondylosis    2  Neck pain        Plan  The Patient's neck pain persists despite time, relative rest, activity modification and therapy  As the patient previously benefited from cervical rhizotomy which lasted approximately three years I think it is reasonable to pursue one diagnostic medial branch block using bupivacaine 0 75%  If he demonstrates an appropriate response he be candidate for the radiofrequency ablation  In the office today, we reviewed the nature of the patient's pathology in depth using  diagrams and models  I discussed the approach we would use for the medial branch block and provided literature for home review  The patient understands the risks associated with the procedure including bleeding, infection, tissue injury, allergic reaction and paralysis and provided written and verbal consent in the office today      My impressions and treatment recommendations were discussed in detail with the patient who verbalized understanding and had no further questions  Discharge instructions were provided  I personally saw and examined the patient and I agree with the above discussed plan of care  This note is created using dictation transcription  It may contain typographical errors, grammatical errors, improperly dictated words, background noise and other errors  Orders Placed This Encounter   Procedures    FL spine and pain procedure     Standing Status:   Future     Standing Expiration Date:   5/3/2027     Order Specific Question:   Reason for Exam:     Answer:   Left C4,5,6,7 MBB with 0 75% bupiv     Order Specific Question:   Anticoagulant hold needed? Answer:   no     No orders of the defined types were placed in this encounter  History of Present Illness    Fidel Rahman  is a 75 y o  male last treated with a left cervical rhizotomy in 2018  He was doing well but his pain returned similar nature in character    He denies any significant changes to his health is reviewed today in the office  His pain is 10 out of 10 the visual analog scale left side of his neck he denies any radiation or upper extremity weakness  His pain is worse in the morning in the evening described intermittent burning sharp with a shooting sensation  His pain significant x-rays of the test living activities  I have personally reviewed and/or updated the patient's past medical history, past surgical history, family history, social history, current medications, allergies, and vital signs today  Review of Systems   Constitutional: Negative for fever and unexpected weight change  HENT: Negative for trouble swallowing  Eyes: Negative for visual disturbance  Respiratory: Negative for shortness of breath and wheezing  Cardiovascular: Negative for chest pain and palpitations  Gastrointestinal: Negative for constipation, diarrhea, nausea and vomiting  Endocrine: Negative for cold intolerance, heat intolerance and polydipsia  Genitourinary: Negative for difficulty urinating and frequency  Musculoskeletal: Negative for arthralgias, gait problem, joint swelling and myalgias  Skin: Negative for rash  Neurological: Negative for dizziness, seizures, syncope, weakness and headaches  Hematological: Does not bruise/bleed easily  Psychiatric/Behavioral: Negative for dysphoric mood  All other systems reviewed and are negative  Patient Active Problem List   Diagnosis    Cervical spondylosis    Neck pain    Chronic pain syndrome    Gastroesophageal reflux disease without esophagitis    Family history of colon cancer    History of colon polyps    A-fib ([address] )    HTN (hypertension)    Hyperlipidemia    Obesity (BMI 30 0-34  9)    Anxiety    Acute vascular insufficiency of intestine (HCC)    Arthritis    Hyponatremia    Hypoxemia    Idiopathic osteoarthritis    Insomnia    Intra-abdominal hernia    MARY (obstructive sleep apnea)    Umbilical hernia    CPAP (continuous positive airway pressure) dependence    Arthritis of carpometacarpal (CMC) joint of left thumb       Past Medical History:   Diagnosis Date    Anxiety     Atrial fibrillation (HCC)     Colon polyp     CPAP (continuous positive airway pressure) dependence     Has not been using    GERD (gastroesophageal reflux disease)     Hypercholesterolemia     Hypertension     Sleep apnea     not using CPAP       Past Surgical History:   Procedure Laterality Date    ABDOMINAL SURGERY      CAST APPLICATION Left 69/2/6059    Procedure: Application of thumb spica splint;  Surgeon: Usha Moctezuma MD;  Location:  MAIN OR;  Service: Orthopedics    COLONOSCOPY      July 2019:  Sessile serrated adenoma  June 2014, hyperplastic polyps and internal hemorrhoids    ESOPHAGOGASTRODUODENOSCOPY      Irregular Z-line and gastritis biopsies negative for H pylori or Newton's    HYDROCELE EXCISION / REPAIR      PA ARTHRP INTERPOS INTERCARPAL/METACARPAL JOINTS Right 7/14/2022    Procedure: right thumb interpositional arthroplasty;  Surgeon: Usha Moctezuma MD;  Location:  MAIN OR;  Service: Orthopedics    PA ARTHRP INTERPOS INTERCARPAL/METACARPAL JOINTS Left 12/1/2022    Procedure: Left thumb interpositional arthroplasty with pinning of the Metacarpophalangeal joint;  Surgeon: Usha Moctezuma MD;  Location:  MAIN OR;  Service: Orthopedics    UMBILICAL HERNIA REPAIR      UPPER GASTROINTESTINAL ENDOSCOPY      October 2020: Irregular Z-line and gastritis, biopsies negative for EOE, Newton's, H pylori     2008:  Negative for mass ulcer or Newton's    VOLVULUS REDUCTION  2015       Family History   Problem Relation Age of Onset   [facility] Breast cancer Mother     Esophageal cancer Father     Cancer Father         Esophageal    Diabetes Family     Breast cancer Family     Esophageal cancer Family     Colon cancer Family     Colon cancer Brother     Colon cancer Brother     Diabetes Brother "      Social History     Occupational History    Not on file   Tobacco Use    Smoking status: Never    Smokeless tobacco: Never   Vaping Use    Vaping Use: Never used   Substance and Sexual Activity    Alcohol use: Not Currently     Comment: 2-3 daily, red wine or scotch    Drug use: No    Sexual activity: Not Currently     Partners: Female       Current Outpatient Medications on File Prior to Visit   Medication Sig    acetaminophen (TYLENOL) 650 mg CR tablet Take 1 tablet (650 mg total) by mouth every 8 (eight) hours as needed for mild pain    amLODIPine (NORVASC) 10 mg tablet Take 10 mg by mouth daily    esomeprazole (NexIUM) 40 MG capsule Take 1 capsule (40 mg total) by mouth daily    losartan-hydrochlorothiazide (HYZAAR) 100-25 MG per tablet Take 1 tablet by mouth daily    metoprolol tartrate (LOPRESSOR) 50 mg tablet Take 50 mg by mouth every 12 (twelve) hours    PARoxetine (PAXIL) 20 mg tablet Take 20 mg by mouth daily    pravastatin (PRAVACHOL) 40 mg tablet Take 40 mg by mouth daily    rivaroxaban (XARELTO) 20 mg tablet Take 20 mg by mouth daily with breakfast     No current facility-administered medications on file prior to visit  No Known Allergies    Physical Exam    /82 (BP Location: Left arm, Patient Position: Sitting, Cuff Size: Standard)   Pulse 62   Temp 98 4 °F (36 9 °C)   Ht 6' 3\" (1 905 m)   Wt 129 kg (285 lb)   BMI 35 62 kg/m²     Constitutional: normal, well developed, well nourished, alert, in no distress and non-toxic and no overt pain behavior  and obese  Eyes: anicteric  HEENT: grossly intact  Neck: supple, symmetric, trachea midline and no masses   Pulmonary:even and unlabored  Cardiovascular:No edema or pitting edema present  Skin:Normal without rashes or lesions and well hydrated  Psychiatric:Mood and affect appropriate  Neurologic:Cranial Nerves II-XII grossly intact  Musculoskeletal:normal Inspection:  Normal station and gait    Normal cervical curves and " head posture  Skin intact without erythema  No sensory loss  There is no atrophy  Palpation:  There is tenderness to palpation overlying the left cervical paraspinals, cervical facet joints  There is no tenderness over the upper trapezius muscles bilateral  No shoulder tenderness  Motor/Strength:  5/5 strength in the bilateral upper extremities  Reflexes:  equal and symmetric in the upper limbs  Sensation:   Sensation intact to light touch and pinprick in the upper limbs  Maneuvers:  Negative Spurling's maneuver  Negative Lhermitte's sign  Positive cervical facet loading on the left with pain with neck extension    Imaging  CERVICAL SPINE @  6-5-18     INDICATION:   M54 2: Cervicalgia  Neck and left shoulder pain for 4 years getting worse      COMPARISON:  None     VIEWS:  XR SPINE CERVICAL COMPLETE 4 OR 5 VW NON INJURY         FINDINGS:     No evidence of fracture       Straightening of the cervical lordosis may be related to patient positioning or muscle spasm      Multilevel disc space narrowing most severe at C5-C6 and C6-C7 with associated anterior marginal osteophyte formation       The neural foramina are patent      The prevertebral soft tissues are within normal limits        The lung apices are clear      IMPRESSION:     Straightening of the cervical lordosis may be related to patient positioning or muscle spasm      Multilevel disc space narrowing most severe at C5-C6 and C6-C7 with associated anterior marginal osteophyte formation  Consider MRI  given the chronicity of symptoms  I have personally reviewed pertinent films in PACS and my interpretation is Multilevel cervical spondylosis

## 2023-05-25 ENCOUNTER — HOSPITAL ENCOUNTER (OUTPATIENT)
Dept: RADIOLOGY | Facility: CLINIC | Age: 68
Discharge: HOME | End: 2023-05-25

## 2023-05-25 VITALS
TEMPERATURE: 97.7 F | DIASTOLIC BLOOD PRESSURE: 85 MMHG | RESPIRATION RATE: 18 BRPM | HEART RATE: 60 BPM | SYSTOLIC BLOOD PRESSURE: 143 MMHG | OXYGEN SATURATION: 94 %

## 2023-05-25 DIAGNOSIS — M47.812 CERVICAL SPONDYLOSIS: ICD-10-CM

## 2023-05-25 DIAGNOSIS — M54.2 NECK PAIN: ICD-10-CM

## 2023-05-25 RX ORDER — BUPIVACAINE HYDROCHLORIDE 7.5 MG/ML
10 INJECTION, SOLUTION EPIDURAL; RETROBULBAR ONCE
Status: COMPLETED | OUTPATIENT
Start: 2023-05-25 | End: 2023-05-25

## 2023-05-25 RX ADMIN — BUPIVACAINE HYDROCHLORIDE 3 ML: 7.5 INJECTION, SOLUTION EPIDURAL; RETROBULBAR at 10:44

## 2023-05-25 NOTE — DISCHARGE INSTRUCTIONS

## 2023-07-20 ENCOUNTER — TELEPHONE (OUTPATIENT)
Dept: PAIN MEDICINE | Facility: CLINIC | Age: 68
End: 2023-07-20

## 2023-07-20 ENCOUNTER — HOSPITAL ENCOUNTER (OUTPATIENT)
Dept: RADIOLOGY | Facility: CLINIC | Age: 68
Discharge: HOME | End: 2023-07-20
Admitting: ANESTHESIOLOGY
Payer: COMMERCIAL

## 2023-07-20 VITALS
RESPIRATION RATE: 18 BRPM | HEART RATE: 71 BPM | DIASTOLIC BLOOD PRESSURE: 75 MMHG | TEMPERATURE: 97.5 F | SYSTOLIC BLOOD PRESSURE: 163 MMHG | OXYGEN SATURATION: 94 %

## 2023-07-20 DIAGNOSIS — M54.2 CERVICAL PAIN (NECK): ICD-10-CM

## 2023-07-20 DIAGNOSIS — M47.812 CERVICAL SPONDYLOSIS: ICD-10-CM

## 2023-07-20 PROCEDURE — 64633 DESTROY CERV/THOR FACET JNT: CPT | Performed by: ANESTHESIOLOGY

## 2023-07-20 PROCEDURE — 64634 DESTROY C/TH FACET JNT ADDL: CPT | Performed by: ANESTHESIOLOGY

## 2023-07-20 RX ADMIN — LIDOCAINE HYDROCHLORIDE 3 ML: 20 INJECTION, SOLUTION EPIDURAL; INFILTRATION; INTRACAUDAL at 13:26

## 2023-07-20 NOTE — H&P
History of Present Illness: The patient is a 70 y.o. male who presents with complaints of neck pain. Past Medical History:   Diagnosis Date   • Anxiety    • Atrial fibrillation (HCC)    • Colon polyp    • CPAP (continuous positive airway pressure) dependence     Has not been using   • GERD (gastroesophageal reflux disease)    • Hypercholesterolemia    • Hypertension    • Sleep apnea     not using CPAP       Past Surgical History:   Procedure Laterality Date   • ABDOMINAL SURGERY     • CAST APPLICATION Left 16/7/0966    Procedure: Application of thumb spica splint;  Surgeon: Sylvain Tipton MD;  Location:  MAIN OR;  Service: Orthopedics   • COLONOSCOPY      July 2019:  Sessile serrated adenoma  June 2014, hyperplastic polyps and internal hemorrhoids   • ESOPHAGOGASTRODUODENOSCOPY      Irregular Z-line and gastritis biopsies negative for H pylori or Newton's   • HYDROCELE EXCISION / REPAIR     • WA ARTHRP INTERPOS INTERCARPAL/METACARPAL JOINTS Right 7/14/2022    Procedure: right thumb interpositional arthroplasty;  Surgeon: Sylvain Tipton MD;  Location:  MAIN OR;  Service: Orthopedics   • WA ARTHRP INTERPOS INTERCARPAL/METACARPAL JOINTS Left 12/1/2022    Procedure: Left thumb interpositional arthroplasty with pinning of the Metacarpophalangeal joint;  Surgeon: Sylvain Tipton MD;  Location:  MAIN OR;  Service: Orthopedics   • UMBILICAL HERNIA REPAIR     • UPPER GASTROINTESTINAL ENDOSCOPY      October 2020: Irregular Z-line and gastritis, biopsies negative for EOE, Newton's, H pylori.    2008:  Negative for mass ulcer or Newton's   • VOLVULUS REDUCTION  2015         Current Outpatient Medications:   •  acetaminophen (TYLENOL) 650 mg CR tablet, Take 1 tablet (650 mg total) by mouth every 8 (eight) hours as needed for mild pain, Disp: 30 tablet, Rfl: 0  •  amLODIPine (NORVASC) 10 mg tablet, Take 10 mg by mouth daily, Disp: , Rfl: 1  •  esomeprazole (NexIUM) 40 MG capsule, Take 1 capsule (40 mg total) by mouth daily, Disp: 90 capsule, Rfl: 5  •  losartan-hydrochlorothiazide (HYZAAR) 100-25 MG per tablet, Take 1 tablet by mouth daily, Disp: , Rfl: 0  •  metoprolol tartrate (LOPRESSOR) 50 mg tablet, Take 50 mg by mouth every 12 (twelve) hours, Disp: , Rfl:   •  PARoxetine (PAXIL) 20 mg tablet, Take 20 mg by mouth daily, Disp: , Rfl: 0  •  pravastatin (PRAVACHOL) 40 mg tablet, Take 40 mg by mouth daily, Disp: , Rfl: 0  •  rivaroxaban (XARELTO) 20 mg tablet, Take 20 mg by mouth daily with breakfast, Disp: , Rfl:     Current Facility-Administered Medications:   •  lidocaine (PF) (XYLOCAINE-MPF) 2 % injection 3 mL, 3 mL, Perineural, Once, Bjorn Jerez, DO    No Known Allergies    Physical Exam:   General: Awake, Alert, Oriented x 3, Mood and affect appropriate  Respiratory: Respirations even and unlabored  Cardiovascular: Peripheral pulses intact; no edema  Musculoskeletal Exam: Pain with cervical extension    ASA Score: II    Patient/Chart Verification  Patient ID Verified: Verbal  ID Band Applied: No  Consents Confirmed: To be obtained in the Pre-Procedure area  H&P( within 30 days) Verified: To be obtained in the Pre-Procedure area  Interval H&P(within 24 hr) Complete (required for Outpatients and Surgery Admit only): To be obtained in the Pre-Procedure area    Assessment:   1. Cervical spondylosis    2.  Cervical pain (neck)        Plan: Left C4,5,6 RFA

## 2023-07-20 NOTE — DISCHARGE INSTRUCTIONS

## 2023-07-21 NOTE — TELEPHONE ENCOUNTER
S/w pt, stated that his chronic pain is gone. Pt c/o pain / tenderness s/t procedure. Advised pt to allow 2-6 weeks for the full effect. Cb if s/s infection present: redness, drainage, swelling at the site or fever 100+, or if a sunburn like sensation in the area of the procedure presents. Ok to continue w/ rest, ice / heat, medication as prescribed / directed. Cb if questions or issues arise. Pt verbalized understanding and appreciation. Pt verbalized understanding and appreciation.  Confirmed 8/2/23 fu ov

## 2023-09-13 ENCOUNTER — OFFICE VISIT (OUTPATIENT)
Dept: PAIN MEDICINE | Facility: CLINIC | Age: 68
End: 2023-09-13
Payer: MEDICARE

## 2023-09-13 VITALS
HEART RATE: 72 BPM | SYSTOLIC BLOOD PRESSURE: 142 MMHG | TEMPERATURE: 98.4 F | WEIGHT: 296 LBS | HEIGHT: 75 IN | DIASTOLIC BLOOD PRESSURE: 80 MMHG | BODY MASS INDEX: 36.8 KG/M2

## 2023-09-13 DIAGNOSIS — G89.4 CHRONIC PAIN SYNDROME: Primary | ICD-10-CM

## 2023-09-13 DIAGNOSIS — M54.2 CERVICAL PAIN (NECK): ICD-10-CM

## 2023-09-13 DIAGNOSIS — M47.812 CERVICAL SPONDYLOSIS: ICD-10-CM

## 2023-09-13 PROBLEM — E66.01 OBESITY, MORBID (HCC): Status: ACTIVE | Noted: 2023-09-13

## 2023-09-13 PROCEDURE — 99214 OFFICE O/P EST MOD 30 MIN: CPT | Performed by: NURSE PRACTITIONER

## 2023-09-13 NOTE — PROGRESS NOTES
Assessment:  1. Chronic pain syndrome    2. Cervical spondylosis    3. Cervical pain (neck)        Plan:  1. I will order an MRI of the cervical spine without contrast for further evaluation of the patient's pain as he has not had relief with chiropractic therapy or cervical RFA. 2.  Patient may continue chiropractic therapy as scheduled   3. Patient may continue Tylenol as needed and should not exceed more than 3000 mg in 24 hours  4. We will avoid NSAIDs secondary to anticoagulation  5. Follow-up after imaging or sooner if needed    History of Present Illness: The patient is a 70 y.o. male last seen on 7/20/2023 who presents for a follow up office visit in regards to chronic left-sided neck pain without radicular complaints into the upper extremities he denies bowel or bladder incontinence or balance issues. Patient is status post left C4-6 RFA  Completed on July 20, 2023 without any significant improvement of pain. He has been participating in chiropractic therapy twice a month for the last year with variable relief. He does take Tylenol without much improvement of his symptoms. He is unable to take NSAIDs secondary to anticoagulation. I do not have any recent advancedimaging of the cervical spine. The patient rates his pain an 8 out of 10 on the numeric pain rating scale. He occasionally has pain in the morning which is described as dull aching and sharp    I have personally reviewed and/or updated the patient's past medical history, past surgical history, family history, social history, current medications, allergies, and vital signs today. Review of Systems:    Review of Systems   Respiratory: Negative for shortness of breath. Cardiovascular: Negative for chest pain. Gastrointestinal: Negative for constipation, diarrhea, nausea and vomiting. Musculoskeletal: Negative for arthralgias, gait problem, joint swelling and myalgias. Skin: Negative for rash.    Neurological: Negative for dizziness, seizures and weakness. All other systems reviewed and are negative. Past Medical History:   Diagnosis Date   • Anxiety    • Atrial fibrillation (HCC)    • Colon polyp    • CPAP (continuous positive airway pressure) dependence     Has not been using   • GERD (gastroesophageal reflux disease)    • Hypercholesterolemia    • Hypertension    • Sleep apnea     not using CPAP       Past Surgical History:   Procedure Laterality Date   • ABDOMINAL SURGERY     • CAST APPLICATION Left 25/2/1720    Procedure: Application of thumb spica splint;  Surgeon: Ijeoma Evans MD;  Location:  MAIN OR;  Service: Orthopedics   • COLONOSCOPY      July 2019:  Sessile serrated adenoma  June 2014, hyperplastic polyps and internal hemorrhoids   • ESOPHAGOGASTRODUODENOSCOPY      Irregular Z-line and gastritis biopsies negative for H pylori or Newton's   • HYDROCELE EXCISION / REPAIR     • NM ARTHRP INTERPOS INTERCARPAL/METACARPAL JOINTS Right 7/14/2022    Procedure: right thumb interpositional arthroplasty;  Surgeon: Ijeoma Evans MD;  Location:  MAIN OR;  Service: Orthopedics   • NM ARTHRP INTERPOS INTERCARPAL/METACARPAL JOINTS Left 12/1/2022    Procedure: Left thumb interpositional arthroplasty with pinning of the Metacarpophalangeal joint;  Surgeon: Ijeoma Evans MD;  Location:  MAIN OR;  Service: Orthopedics   • UMBILICAL HERNIA REPAIR     • UPPER GASTROINTESTINAL ENDOSCOPY      October 2020: Irregular Z-line and gastritis, biopsies negative for EOE, Newton's, H pylori.    2008:  Negative for mass ulcer or Newton's   • VOLVULUS REDUCTION  2015       Family History   Problem Relation Age of Onset   • Breast cancer Mother    • Esophageal cancer Father    • Cancer Father         Esophageal   • Diabetes Family    • Breast cancer Family    • Esophageal cancer Family    • Colon cancer Family    • Colon cancer Brother    • Colon cancer Brother    • Diabetes Brother        Social History     Occupational History   • Not on file   Tobacco Use   • Smoking status: Never   • Smokeless tobacco: Never   Vaping Use   • Vaping Use: Never used   Substance and Sexual Activity   • Alcohol use: Not Currently     Comment: 2-3 daily, red wine or scotch   • Drug use: No   • Sexual activity: Not Currently     Partners: Female         Current Outpatient Medications:   •  acetaminophen (TYLENOL) 650 mg CR tablet, Take 1 tablet (650 mg total) by mouth every 8 (eight) hours as needed for mild pain, Disp: 30 tablet, Rfl: 0  •  amLODIPine (NORVASC) 10 mg tablet, Take 10 mg by mouth daily, Disp: , Rfl: 1  •  esomeprazole (NexIUM) 40 MG capsule, Take 1 capsule (40 mg total) by mouth daily, Disp: 90 capsule, Rfl: 5  •  losartan-hydrochlorothiazide (HYZAAR) 100-25 MG per tablet, Take 1 tablet by mouth daily, Disp: , Rfl: 0  •  metoprolol tartrate (LOPRESSOR) 50 mg tablet, Take 50 mg by mouth every 12 (twelve) hours, Disp: , Rfl:   •  PARoxetine (PAXIL) 20 mg tablet, Take 20 mg by mouth daily, Disp: , Rfl: 0  •  pravastatin (PRAVACHOL) 40 mg tablet, Take 40 mg by mouth daily, Disp: , Rfl: 0  •  rivaroxaban (XARELTO) 20 mg tablet, Take 20 mg by mouth daily with breakfast, Disp: , Rfl:     No Known Allergies    Physical Exam:    /80 (BP Location: Left arm, Patient Position: Sitting, Cuff Size: Large)   Pulse 72   Temp 98.4 °F (36.9 °C)   Ht 6' 3" (1.905 m)   Wt 134 kg (296 lb)   BMI 37.00 kg/m²     Constitutional:normal, well developed, well nourished, alert, in no distress and non-toxic and no overt pain behavior. Eyes:anicteric  HEENT:grossly intact  Neck:supple, symmetric, trachea midline and no masses   Pulmonary:even and unlabored  Cardiovascular:No edema or pitting edema present  Skin:Normal without rashes or lesions and well hydrated  Psychiatric:Mood and affect appropriate  Neurologic:Cranial Nerves II-XII grossly intact  Musculoskeletal:normal gait.  Full ROM of the cervical spine      Imaging  MRI cervical spine without contrast    (Results Pending)         Orders Placed This Encounter   Procedures   • MRI cervical spine without contrast

## 2023-11-22 ENCOUNTER — OFFICE VISIT (OUTPATIENT)
Age: 68
End: 2023-11-22
Payer: COMMERCIAL

## 2023-11-22 VITALS
DIASTOLIC BLOOD PRESSURE: 82 MMHG | HEIGHT: 75 IN | BODY MASS INDEX: 36.08 KG/M2 | SYSTOLIC BLOOD PRESSURE: 128 MMHG | WEIGHT: 290.2 LBS

## 2023-11-22 DIAGNOSIS — Z71.89 ENCOUNTER FOR ANTICOAGULATION DISCUSSION AND COUNSELING: ICD-10-CM

## 2023-11-22 DIAGNOSIS — I48.91 ATRIAL FIBRILLATION, UNSPECIFIED TYPE (HCC): ICD-10-CM

## 2023-11-22 DIAGNOSIS — R13.19 ESOPHAGEAL DYSPHAGIA: ICD-10-CM

## 2023-11-22 DIAGNOSIS — K21.9 GASTROESOPHAGEAL REFLUX DISEASE WITHOUT ESOPHAGITIS: Primary | ICD-10-CM

## 2023-11-22 DIAGNOSIS — Z86.010 HISTORY OF COLON POLYPS: ICD-10-CM

## 2023-11-22 DIAGNOSIS — Z80.0 FAMILY HISTORY OF COLON CANCER: ICD-10-CM

## 2023-11-22 PROCEDURE — 99214 OFFICE O/P EST MOD 30 MIN: CPT | Performed by: INTERNAL MEDICINE

## 2023-11-22 RX ORDER — POLYETHYLENE GLYCOL 3350 17 G/17G
238 POWDER, FOR SOLUTION ORAL ONCE
Qty: 238 G | Refills: 0 | Status: SHIPPED | OUTPATIENT
Start: 2023-11-22 | End: 2023-11-22

## 2023-11-22 NOTE — LETTER
November 22, 2023     Juliet Gunter DO  [address] [facility]    Patient: Edson Mukherjee. YOB: 1955   Date of Visit: 11/22/2023       Dear Dr. Juan Rolon:    Thank you for referring Noe Nation to me for evaluation. Below are my notes for this consultation. If you have questions, please do not hesitate to call me. I look forward to following your patient along with you. Sincerely,        Viviane Lance MD        CC: Angel Fuentes MD  11/22/2023  8:40 AM  Sign when Signing Visit  [address] [facility] - Outpatient Follow-up Note  Edson Mukherjee. 70 y.o. male MRN: [id number]  Encounter: [id number]    ASSESSMENT AND PLAN:      1. Gastroesophageal reflux disease without esophagitis  2. Esophageal dysphagia  GERD clinically stable without alarm symptoms of dysphagia weight loss or bleeding. Previous problems with dysphagia have resolved. Taking PPI daily. Previous endoscopy negative for Newton's. Discussed trying to reduce dose to the lowest effective amount to minimize PPI exposure. Reflux diet and precautions  Continue Nexium daily, okay to try to decrease to every other day    3. History of colon polyps  - Colonoscopy; Future  - polyethylene glycol (MiraLax) 17 GM/SCOOP powder; Take 238 g by mouth once for 1 dose Take 238 g my mouth. Use as directed  Dispense: 238 g; Refill: 0  4. Family history of colon cancer  Personal history of adenomatous and serrated adenoma polyps. Family history of colon cancer. Hence patient is increased risk. Last surveillance colonoscopy July 2019 and surveillance every 5 years recommended. Next colonoscopy due in the upcoming summer, July 2024  - Colonoscopy; Future    5. Encounter for anticoagulation discussion and counseling  6. Atrial fibrillation, unspecified type [facility])  Patient now on Xarelto due to atrial fibrillation.   Has not had any bleeding complications with recent orthopedic surgeries and denies any other new cardiopulmonary issues. Discussed risk of thromboembolic disease including stroke and PE should his medication be held versus the risk of increased bleeding during colonoscopy on his medication. Advised holding Xarelto for 2 days prior to procedure and restarting medication as soon as possible after procedures completed. Will confer with cardiology verifying that no additional interventions are needed other than a 2-day hold of his Xarelto      Followup Appointment: 1 year or sooner if needed  ______________________________________________________________________    Chief Complaint   Patient presents with   • Follow-up     Yearly follow up. Pt states he is doing well, not currently experiencing any GI symptoms. HPI:  No heartburn or reflux. No dysphagia. Taking Nexium 40 daily. Appetite and weight stable. Diagnosed with A-fib about two years abo, now on Xarelto. No bleeding issues, just had knee replaced, no issues. Discussed CRC surveillance, hx of polyps including serrated adenoma and also +FHx, due for next colonoscopy July 2024.       Historical Information  Past Medical History:   Diagnosis Date   • Anxiety    • Atrial fibrillation ([address])    • Colon polyp    • CPAP (continuous positive airway pressure) dependence     Has not been using   • GERD (gastroesophageal reflux disease)    • Hypercholesterolemia    • Hypertension    • Sleep apnea     not using CPAP     Past Surgical History:   Procedure Laterality Date   • ABDOMINAL SURGERY     • CAST APPLICATION Left 75/0/8668    Procedure: Application of thumb spica splint;  Surgeon: Shaniqua Batres MD;  Location:  MAIN OR;  Service: Orthopedics   • COLONOSCOPY      July 2019:  Sessile serrated adenoma  June 2014, hyperplastic polyps and internal hemorrhoids   • ESOPHAGOGASTRODUODENOSCOPY      Irregular Z-line and gastritis biopsies negative for H pylori or Newton's   • HYDROCELE EXCISION / REPAIR     • SD ARTHRP INTERPOS INTERCARPAL/METACARPAL JOINTS Right 7/14/2022    Procedure: right thumb interpositional arthroplasty;  Surgeon: Larissa Ortiz MD;  Location: [facility] MAIN OR;  Service: Orthopedics   • SD ARTHRP INTERPOS INTERCARPAL/METACARPAL JOINTS Left 12/1/2022    Procedure: Left thumb interpositional arthroplasty with pinning of the Metacarpophalangeal joint;  Surgeon: Larissa Ortiz MD;  Location: [facility] MAIN OR;  Service: Orthopedics   • UMBILICAL HERNIA REPAIR     • UPPER GASTROINTESTINAL ENDOSCOPY      October 2020: Irregular Z-line and gastritis, biopsies negative for EOE, Newton's, H pylori. 2008:  Negative for mass ulcer or Newton's   • VOLVULUS REDUCTION  2015     Social History     Substance and Sexual Activity   Alcohol Use Not Currently    Comment: 2-3 daily, red wine or scotch     Social History     Substance and Sexual Activity   Drug Use No     Social History     Tobacco Use   Smoking Status Never   Smokeless Tobacco Never     Family History   Problem Relation Age of Onset   • Breast cancer Mother    • Esophageal cancer Father    • Cancer Father         Esophageal   • Diabetes Family    • Breast cancer Family    • Esophageal cancer Family    • Colon cancer Family    • Colon cancer Brother    • Colon cancer Brother    • Diabetes Brother          Current Outpatient Medications:   •  acetaminophen (TYLENOL) 650 mg CR tablet  •  amLODIPine (NORVASC) 10 mg tablet  •  esomeprazole (NexIUM) 40 MG capsule  •  losartan-hydrochlorothiazide (HYZAAR) 100-25 MG per tablet  •  metoprolol tartrate (LOPRESSOR) 50 mg tablet  •  PARoxetine (PAXIL) 20 mg tablet  •  polyethylene glycol (MiraLax) 17 GM/SCOOP powder  •  pravastatin (PRAVACHOL) 40 mg tablet  •  rivaroxaban (XARELTO) 20 mg tablet  No Known Allergies  Reviewed medications and allergies and updated as indicated    PHYSICAL EXAM:    Blood pressure 128/82, height 6' 3" (1.905 m), weight 132 kg (290 lb 3.2 oz).  Body mass index is 36.27 kg/m². General Appearance: NAD, cooperative, alert  Eyes: Anicteric, PERRLA, EOMI  ENT:  Normocephalic, atraumatic, normal mucosa. Neck:  Supple, symmetrical, trachea midline  Resp:  Clear to auscultation bilaterally; no rales, rhonchi or wheezing; respirations unlabored   CV:  S1 S2, Regular rate and rhythm; no murmur, rub, or gallop. GI:  Soft, non-tender, non-distended; normal bowel sounds; no masses, no organomegaly   Rectal: Deferred  Musculoskeletal: No cyanosis, clubbing or edema. Normal ROM. Skin:  No jaundice, rashes, or lesions   Heme/Lymph: No palpable cervical lymphadenopathy  Psych: Normal affect, good eye contact  Neuro: No gross deficits, AAOx3    Lab Results:   Lab Results   Component Value Date    WBC 4.0 06/20/2022    HGB 14.8 06/20/2022    HCT 43.4 06/20/2022    MCV 91 06/20/2022     06/20/2022     Lab Results   Component Value Date    K 4.5 06/20/2022     06/20/2022    CO2 24 06/20/2022    BUN 16 06/20/2022    CREATININE 0.78 06/20/2022    EGFR 98 06/20/2022     No results found for: "IRON", "TIBC", "FERRITIN"  No results found for: "LIPASE"    Radiology Results:   No results found.     Answers submitted by the patient for this visit:  Abdominal Pain Questionnaire (Submitted on 11/19/2023)  Chief Complaint: Abdominal pain  Progression since onset: resolved

## 2023-11-22 NOTE — PATIENT INSTRUCTIONS
Reflux diet and precautions. Continue Nexium 40 mg daily, okay to try to decrease to every other day. Plan surveillance colonoscopy for July 2024. Office follow-up in about 1 year.

## 2023-11-22 NOTE — PROGRESS NOTES
[facility] - Outpatient Follow-up Note  Marilee Ashia. 70 y.o. male MRN: [id number]  Encounter: [id number]    ASSESSMENT AND PLAN:      1. Gastroesophageal reflux disease without esophagitis  2. Esophageal dysphagia  GERD clinically stable without alarm symptoms of dysphagia weight loss or bleeding. Previous problems with dysphagia have resolved. Taking PPI daily. Previous endoscopy negative for Newton's. Discussed trying to reduce dose to the lowest effective amount to minimize PPI exposure. Reflux diet and precautions  Continue Nexium daily, okay to try to decrease to every other day    3. History of colon polyps  - Colonoscopy; Future  - polyethylene glycol (MiraLax) 17 GM/SCOOP powder; Take 238 g by mouth once for 1 dose Take 238 g my mouth. Use as directed  Dispense: 238 g; Refill: 0  4. Family history of colon cancer  Personal history of adenomatous and serrated adenoma polyps. Family history of colon cancer. Hence patient is increased risk. Last surveillance colonoscopy July 2019 and surveillance every 5 years recommended. Next colonoscopy due in the upcoming summer, July 2024  - Colonoscopy; Future    5. Encounter for anticoagulation discussion and counseling  6. Atrial fibrillation, unspecified type [facility])  Patient now on Xarelto due to atrial fibrillation. Has not had any bleeding complications with recent orthopedic surgeries and denies any other new cardiopulmonary issues. Discussed risk of thromboembolic disease including stroke and PE should his medication be held versus the risk of increased bleeding during colonoscopy on his medication. Advised holding Xarelto for 2 days prior to procedure and restarting medication as soon as possible after procedures completed.   Will confer with cardiology verifying that no additional interventions are needed other than a 2-day hold of his Xarelto      Followup Appointment: 1 year or sooner if needed  ______________________________________________________________________    Chief Complaint   Patient presents with    Follow-up     Yearly follow up. Pt states he is doing well, not currently experiencing any GI symptoms. HPI:  No heartburn or reflux. No dysphagia. Taking Nexium 40 daily. Appetite and weight stable. Diagnosed with A-fib about two years abo, now on Xarelto. No bleeding issues, just had knee replaced, no issues. Discussed CRC surveillance, hx of polyps including serrated adenoma and also +FHx, due for next colonoscopy July 2024.       Historical Information   Past Medical History:   Diagnosis Date    Anxiety     Atrial fibrillation (HCC)     Colon polyp     CPAP (continuous positive airway pressure) dependence     Has not been using    GERD (gastroesophageal reflux disease)     Hypercholesterolemia     Hypertension     Sleep apnea     not using CPAP     Past Surgical History:   Procedure Laterality Date    ABDOMINAL SURGERY      CAST APPLICATION Left 48/3/2110    Procedure: Application of thumb spica splint;  Surgeon: Marielena Morrison MD;  Location:  MAIN OR;  Service: Orthopedics    COLONOSCOPY      July 2019:  Sessile serrated adenoma  June 2014, hyperplastic polyps and internal hemorrhoids    ESOPHAGOGASTRODUODENOSCOPY      Irregular Z-line and gastritis biopsies negative for H pylori or Newton's    HYDROCELE EXCISION / Lovenia Cotton INTERCARPAL/METACARPAL JOINTS Right 7/14/2022    Procedure: right thumb interpositional arthroplasty;  Surgeon: Marielena Morrison MD;  Location:  MAIN OR;  Service: Orthopedics    CT ARTHRP INTERPOS INTERCARPAL/METACARPAL JOINTS Left 12/1/2022    Procedure: Left thumb interpositional arthroplasty with pinning of the Metacarpophalangeal joint;  Surgeon: Marielena Morrison MD;  Location:  MAIN OR;  Service: Orthopedics    UMBILICAL HERNIA REPAIR      UPPER GASTROINTESTINAL ENDOSCOPY      October 2020: Irregular Z-line and gastritis, biopsies negative for EOE, Newton's, H pylori. 2008:  Negative for mass ulcer or Newton's    VOLVULUS REDUCTION  2015     Social History     Substance and Sexual Activity   Alcohol Use Not Currently    Comment: 2-3 daily, red wine or scotch     Social History     Substance and Sexual Activity   Drug Use No     Social History     Tobacco Use   Smoking Status Never   Smokeless Tobacco Never     Family History   Problem Relation Age of Onset    Breast cancer Mother     Esophageal cancer Father     Cancer Father         Esophageal    Diabetes Family     Breast cancer Family     Esophageal cancer Family     Colon cancer Family     Colon cancer Brother     Colon cancer Brother     Diabetes Brother          Current Outpatient Medications:     acetaminophen (TYLENOL) 650 mg CR tablet    amLODIPine (NORVASC) 10 mg tablet    esomeprazole (NexIUM) 40 MG capsule    losartan-hydrochlorothiazide (HYZAAR) 100-25 MG per tablet    metoprolol tartrate (LOPRESSOR) 50 mg tablet    PARoxetine (PAXIL) 20 mg tablet    polyethylene glycol (MiraLax) 17 GM/SCOOP powder    pravastatin (PRAVACHOL) 40 mg tablet    rivaroxaban (XARELTO) 20 mg tablet  No Known Allergies  Reviewed medications and allergies and updated as indicated    PHYSICAL EXAM:    Blood pressure 128/82, height 6' 3" (1.905 m), weight 132 kg (290 lb 3.2 oz). Body mass index is 36.27 kg/m². General Appearance: NAD, cooperative, alert  Eyes: Anicteric, PERRLA, EOMI  ENT:  Normocephalic, atraumatic, normal mucosa. Neck:  Supple, symmetrical, trachea midline  Resp:  Clear to auscultation bilaterally; no rales, rhonchi or wheezing; respirations unlabored   CV:  S1 S2, Regular rate and rhythm; no murmur, rub, or gallop. GI:  Soft, non-tender, non-distended; normal bowel sounds; no masses, no organomegaly   Rectal: Deferred  Musculoskeletal: No cyanosis, clubbing or edema. Normal ROM.   Skin:  No jaundice, rashes, or lesions   Heme/Lymph: No palpable cervical lymphadenopathy  Psych: Normal affect, good eye contact  Neuro: No gross deficits, AAOx3    Lab Results:   Lab Results   Component Value Date    WBC 4.0 06/20/2022    HGB 14.8 06/20/2022    HCT 43.4 06/20/2022    MCV 91 06/20/2022     06/20/2022     Lab Results   Component Value Date    K 4.5 06/20/2022     06/20/2022    CO2 24 06/20/2022    BUN 16 06/20/2022    CREATININE 0.78 06/20/2022    EGFR 98 06/20/2022     No results found for: "IRON", "TIBC", "FERRITIN"  No results found for: "LIPASE"    Radiology Results:   No results found.     Answers submitted by the patient for this visit:  Abdominal Pain Questionnaire (Submitted on 11/19/2023)  Chief Complaint: Abdominal pain  Progression since onset: resolved

## 2024-01-15 ENCOUNTER — HOSPITAL ENCOUNTER (OUTPATIENT)
Dept: MRI IMAGING | Facility: HOSPITAL | Age: 69
Discharge: HOME | End: 2024-01-15
Payer: COMMERCIAL

## 2024-01-15 DIAGNOSIS — M54.2 CERVICAL PAIN (NECK): ICD-10-CM

## 2024-01-15 PROCEDURE — 72141 MRI NECK SPINE W/O DYE: CPT

## 2024-01-15 PROCEDURE — G1004 CDSM NDSC: HCPCS

## 2024-01-19 ENCOUNTER — TELEPHONE (OUTPATIENT)
Dept: PAIN MEDICINE | Facility: CLINIC | Age: 69
End: 2024-01-19

## 2024-01-19 NOTE — TELEPHONE ENCOUNTER
Caller: candy Resendez    Doctor: Meri    Reason for call: pt returning nurses call    Call back#: [phone]

## 2024-01-19 NOTE — TELEPHONE ENCOUNTER
----- Message from AKSHAT Phoenix sent at 1/19/2024 11:09 AM EST -----  MRI of the lumbar spine reveals multilevel degenerative disc disease and arthritis with various degrees of central and foraminal stenosis.  He does have nerve root encroachment most severe on the left at at C3-4, nerve root encroachment of the left C  6 nerve root at C5-6 and upon the left C8 nerve root C7-T1.  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      It looks like patient is already scheduled for follow-up with MG on 1/25/24. Please have the patient keep this appointment to discuss further treatment options.

## 2024-01-19 NOTE — TELEPHONE ENCOUNTER
Caller: Umer     Doctor: Dr Gallo     Reason for call: Patient returning call from nurse please advise     Call back#: [phone]

## 2024-01-25 ENCOUNTER — OFFICE VISIT (OUTPATIENT)
Dept: PAIN MEDICINE | Facility: CLINIC | Age: 69
End: 2024-01-25
Payer: COMMERCIAL

## 2024-01-25 ENCOUNTER — TELEPHONE (OUTPATIENT)
Dept: RADIOLOGY | Facility: CLINIC | Age: 69
End: 2024-01-25

## 2024-01-25 VITALS
DIASTOLIC BLOOD PRESSURE: 80 MMHG | WEIGHT: 290 LBS | SYSTOLIC BLOOD PRESSURE: 132 MMHG | BODY MASS INDEX: 36.06 KG/M2 | HEIGHT: 75 IN | HEART RATE: 68 BPM | TEMPERATURE: 97.6 F

## 2024-01-25 DIAGNOSIS — M54.12 CERVICAL RADICULOPATHY: Primary | ICD-10-CM

## 2024-01-25 DIAGNOSIS — M47.812 CERVICAL SPONDYLOSIS: ICD-10-CM

## 2024-01-25 DIAGNOSIS — M48.02 FORAMINAL STENOSIS OF CERVICAL REGION: ICD-10-CM

## 2024-01-25 DIAGNOSIS — M48.02 CERVICAL SPINAL STENOSIS: ICD-10-CM

## 2024-01-25 PROCEDURE — 99214 OFFICE O/P EST MOD 30 MIN: CPT | Performed by: PHYSICIAN ASSISTANT

## 2024-01-25 NOTE — PROGRESS NOTES
Assessment:  1. Cervical radiculopathy    2. Foraminal stenosis of cervical region    3. Cervical spinal stenosis    4. Cervical spondylosis        Plan:  While the patient was in the office today, I did have a thorough conversation regarding their chronic pain syndrome, medication management, and treatment plan options.    After discussing options and reviewing the recent cervical spine MRI, I have recommended a consultation with our neurosurgery team therefore I have placed the referral on today's visit.    If surgery is not indicated and/or recommended, we could offer him a C7-T1 epidural steroid injection to address the radicular component of his pain pattern.    As previously stated, he has not experienced any relief from the cervical RFA procedure.    The patient was advised to contact the office should their symptoms worsen in the interim. The patient was agreeable and verbalized an understanding.        History of Present Illness:    The patient is a 68 y.o. male last seen on 9/13/2023 who presents for a follow up office visit in regards to chronic neck pain secondary to cervical spondylosis, cervical degenerative disc disease, spinal stenosis and foraminal stenosis.  The patient currently reports left-sided neck pain that he presently rates a 3 out of 10 and describes as an occasional dull and aching pain with radiation into the left scapula.  He reports increased pain with range of motion especially when turning his head and arm to the left.  Patient reports subjective weakness of the left upper extremity.  He denies bowel or bladder incontinence, denies lower extremity weakness or saddle anesthesia.  Patient underwent left C4-6 radiofrequency ablation on 7/20/2023 and unfortunately has not had any relief from the procedure.  Since his last visit with us, he had the MRI of the cervical spine and presents today to review that and discuss treatment options.    I have personally reviewed and/or updated the  patient's past medical history, past surgical history, family history, social history, current medications, allergies, and vital signs today.       Review of Systems:    Review of Systems   Respiratory:  Negative for shortness of breath.    Cardiovascular:  Negative for chest pain.   Gastrointestinal:  Negative for constipation, diarrhea, nausea and vomiting.   Musculoskeletal:  Negative for arthralgias, gait problem, joint swelling and myalgias.   Skin:  Negative for rash.   Neurological:  Negative for dizziness, seizures and weakness.   All other systems reviewed and are negative.        Past Medical History:   Diagnosis Date   • Anxiety    • Atrial fibrillation (HCC)    • Colon polyp    • CPAP (continuous positive airway pressure) dependence     Has not been using   • GERD (gastroesophageal reflux disease)    • Hypercholesterolemia    • Hypertension    • Sleep apnea     not using CPAP       Past Surgical History:   Procedure Laterality Date   • ABDOMINAL SURGERY     • CAST APPLICATION Left 12/1/2022    Procedure: Application of thumb spica splint;  Surgeon: Papi Molina MD;  Location:  MAIN OR;  Service: Orthopedics   • COLONOSCOPY      July 2019:  Sessile serrated adenoma  June 2014, hyperplastic polyps and internal hemorrhoids   • ESOPHAGOGASTRODUODENOSCOPY      Irregular Z-line and gastritis biopsies negative for H pylori or Newton's   • HYDROCELE EXCISION / REPAIR     • IN ARTHRP INTERPOS INTERCARPAL/METACARPAL JOINTS Right 7/14/2022    Procedure: right thumb interpositional arthroplasty;  Surgeon: Papi Molina MD;  Location:  MAIN OR;  Service: Orthopedics   • IN ARTHRP INTERPOS INTERCARPAL/METACARPAL JOINTS Left 12/1/2022    Procedure: Left thumb interpositional arthroplasty with pinning of the Metacarpophalangeal joint;  Surgeon: Papi Molina MD;  Location:  MAIN OR;  Service: Orthopedics   • UMBILICAL HERNIA REPAIR     • UPPER GASTROINTESTINAL ENDOSCOPY      October 2020:  Irregular Z-line and gastritis, biopsies negative for EOE, Newton's, H pylori.   2008:  Negative for mass ulcer or Newton's   • VOLVULUS REDUCTION  2015       Family History   Problem Relation Age of Onset   • Breast cancer Mother    • Esophageal cancer Father    • Cancer Father         Esophageal   • Diabetes Family    • Breast cancer Family    • Esophageal cancer Family    • Colon cancer Family    • Colon cancer Brother    • Colon cancer Brother    • Diabetes Brother        Social History     Occupational History   • Not on file   Tobacco Use   • Smoking status: Never   • Smokeless tobacco: Never   Vaping Use   • Vaping status: Never Used   Substance and Sexual Activity   • Alcohol use: Not Currently     Comment: 2-3 daily, red wine or scotch   • Drug use: No   • Sexual activity: Not Currently     Partners: Female         Current Outpatient Medications:   •  acetaminophen (TYLENOL) 650 mg CR tablet, Take 1 tablet (650 mg total) by mouth every 8 (eight) hours as needed for mild pain, Disp: 30 tablet, Rfl: 0  •  amLODIPine (NORVASC) 10 mg tablet, Take 10 mg by mouth daily, Disp: , Rfl: 1  •  esomeprazole (NexIUM) 40 MG capsule, Take 1 capsule (40 mg total) by mouth daily, Disp: 90 capsule, Rfl: 5  •  losartan-hydrochlorothiazide (HYZAAR) 100-25 MG per tablet, Take 1 tablet by mouth daily, Disp: , Rfl: 0  •  metoprolol tartrate (LOPRESSOR) 50 mg tablet, Take 50 mg by mouth every 12 (twelve) hours, Disp: , Rfl:   •  PARoxetine (PAXIL) 20 mg tablet, Take 20 mg by mouth daily, Disp: , Rfl: 0  •  pravastatin (PRAVACHOL) 40 mg tablet, Take 40 mg by mouth daily, Disp: , Rfl: 0  •  rivaroxaban (XARELTO) 20 mg tablet, Take 20 mg by mouth daily with breakfast, Disp: , Rfl:   •  polyethylene glycol (MiraLax) 17 GM/SCOOP powder, Take 238 g by mouth once for 1 dose Take 238 g my mouth. Use as directed, Disp: 238 g, Rfl: 0    No Known Allergies    Physical Exam:    /80 (BP Location: Left arm, Patient Position: Sitting,  "Cuff Size: Large)   Pulse 68   Temp 97.6 °F (36.4 °C)   Ht 6' 3\" (1.905 m)   Wt 132 kg (290 lb)   BMI 36.25 kg/m²     Constitutional:normal, well developed, well nourished, alert, in no distress and non-toxic and no overt pain behavior. and overweight  Eyes:anicteric  HEENT:grossly intact  Neck:supple, symmetric, trachea midline and no masses   Pulmonary:even and unlabored  Cardiovascular:No edema or pitting edema present  Skin:Normal without rashes or lesions and well hydrated  Psychiatric:Mood and affect appropriate  Neurologic:Cranial Nerves II-XII grossly intact  Musculoskeletal: Limited range of motion of the cervical spine in all planes, positive Spurling's to the left.      Imaging  MRI CERVICAL SPINE WITHOUT CONTRAST     INDICATION: M54.2: Cervicalgia.     COMPARISON: Cervical spine x-rays 6/5/2018.     TECHNIQUE:  Multiplanar, multisequence imaging of the cervical spine was performed.     IMAGE QUALITY:  Diagnostic.     FINDINGS:     VERTEBRAL SEGMENT AND DISC SPACES:  Approximately 30 degrees levoscoliosis cervical-thoracic spine reduces the accuracy of the examination somewhat as the axial and sagittal images are off axis relative to the plane of the intervertebral discs.     Craniovertebral junction and C1-C2: Mild to moderate anterior atlantoaxial arthropathy. No Chiari or other significant abnormality evident.     C2-C3: Evidence of mild narrowing of the spinal canal and mild to moderate left neural foraminal narrowing due to thickening of the ligamenta flava, less than 5 mm anterolisthesis, disc bulge, in combination with hypertrophic degenerative change of the   facet and uncovertebral joints. Mild disc degeneration.     C3-C4: Evidence of mild to moderate narrowing of the spinal canal with mild indentation upon the subjacent spinal cord and neural foraminal narrowing of a severe degree on the left with possible neural encroachment upon exiting left C4 nerve root and of   a moderate degree on " the right due to thickening of ligamenta flava in combination with hypertrophic degenerative change of the facet and uncovertebral joints. Severe disc degeneration with annular fissuring and Modic type II signal alteration.     C4-C5: Evidence of mild narrowing of the spinal canal with minimal indentation upon the ventral spinal cord and moderate bilateral neural foraminal narrowing due to less than 3 mm anterolisthesis, disc bulge, in combination with hypertrophic degenerative   change of the facet and uncovertebral joints. Severe disc degeneration with annular fissuring and Modic type II signal alteration.     C5-C6: Evidence of mild narrowing of the spinal canal with minimal indentation upon the subjacent spinal cord and neural foraminal narrowing of a moderate to severe degree on the left with possible neural encroachment upon exiting left C6 nerve root and   of a mild degree on the right due to right central disc extrusion with slight caudal extent, in combination with hypertrophic degenerative change of the facet and uncovertebral joints. Severe disc degeneration with annular fissuring and Modic type II   signal alteration. Old anterior wedging deformity C6 loss of 25% anterior vertebral body height.     C6-C7: Evidence of mild to moderate narrowing of the spinal canal and moderate bilateral neural foraminal narrowing due to less than 5 mm retrolisthesis, right central disc protrusion, thickening of ligamenta flava, in combination with hypertrophic   degenerative change of the facet and uncovertebral joints. Severe disc degeneration with prominent anterior osteophytosis, annular fissuring, and Modic type I signal alteration.     C7-T1: Evidence of mild narrowing of the spinal canal and neural foraminal narrowing of a moderate to severe degree on the left with possible neural encroachment upon left C8 nerve root and of a mild degree on the right due to thickening of the ligamenta   flava, less than 3 mm  anterolisthesis, and hypertrophic facet arthropathy. Mild to moderate disc degeneration.     T1-T2, T2-T3 and T3-T4: These levels are included only on sagittal images with suggestion of up to moderate right neural foraminal narrowing due to hypertrophic degenerative change of the facet joints and ligamenta flava.     SPINAL CORD: Discogenic indentations detailed above. No other spinal cord abnormality evident.     EXTRASPINAL STRUCTURES: Mucosal thickening left maxillary sinus. No other significant abnormality evident.     MARROW SIGNAL: Discogenic Modic signal alteration. No other significant abnormality evident.     IMPRESSION:  Spondylosis associated with spinal canal and neural foraminal narrowing detailed level by level above and most notable at C3-C4, C4-C5, C5-C6, C6-C7 and C7-T1.     Approximately 30 degrees levoscoliosis cervical-thoracic spine.     Mucosal thickening left maxillary sinus.  No orders to display         Orders Placed This Encounter   Procedures   • Ambulatory referral to Neurosurgery

## 2024-02-08 ENCOUNTER — APPOINTMENT (OUTPATIENT)
Dept: LAB | Facility: CLINIC | Age: 69
End: 2024-02-08

## 2024-02-08 ENCOUNTER — OCCMED (OUTPATIENT)
Dept: URGENT CARE | Facility: CLINIC | Age: 69
End: 2024-02-08

## 2024-02-08 DIAGNOSIS — Z02.1 PRE-EMPLOYMENT HEALTH SCREENING EXAMINATION: Primary | ICD-10-CM

## 2024-02-08 DIAGNOSIS — Z02.1 PRE-EMPLOYMENT HEALTH SCREENING EXAMINATION: ICD-10-CM

## 2024-02-08 LAB
MEV IGG SER QL IA: NORMAL
MUV IGG SER QL IA: NORMAL
RUBV IGG SERPL IA-ACNC: <10 IU/ML
VZV IGG SER QL IA: NORMAL

## 2024-02-08 PROCEDURE — 86480 TB TEST CELL IMMUN MEASURE: CPT

## 2024-02-08 PROCEDURE — 86735 MUMPS ANTIBODY: CPT

## 2024-02-08 PROCEDURE — 36415 COLL VENOUS BLD VENIPUNCTURE: CPT

## 2024-02-08 PROCEDURE — 86762 RUBELLA ANTIBODY: CPT

## 2024-02-08 PROCEDURE — 86765 RUBEOLA ANTIBODY: CPT

## 2024-02-08 PROCEDURE — 86787 VARICELLA-ZOSTER ANTIBODY: CPT

## 2024-02-09 LAB
GAMMA INTERFERON BACKGROUND BLD IA-ACNC: <0 IU/ML
M TB IFN-G BLD-IMP: NEGATIVE
M TB IFN-G CD4+ BCKGRND COR BLD-ACNC: 0 IU/ML
M TB IFN-G CD4+ BCKGRND COR BLD-ACNC: 0.01 IU/ML
MITOGEN IGNF BCKGRD COR BLD-ACNC: 9.76 IU/ML

## 2024-02-10 DIAGNOSIS — K21.9 GASTROESOPHAGEAL REFLUX DISEASE WITHOUT ESOPHAGITIS: ICD-10-CM

## 2024-02-12 RX ORDER — ESOMEPRAZOLE MAGNESIUM 40 MG/1
40 CAPSULE, DELAYED RELEASE ORAL DAILY
Qty: 90 CAPSULE | Refills: 1 | Status: SHIPPED | OUTPATIENT
Start: 2024-02-12

## 2024-03-25 NOTE — PROGRESS NOTES
OT Evaluation     Today's date: 2023  Patient name: Yeimi Bolton  : 1955  MRN: [id number]  Referring provider: ROD Kim*  Dx:   Encounter Diagnosis     ICD-10-CM    1  Aftercare following surgery of the musculoskeletal system  [id number] [id number] Ambulatory Referral to PT/OT Hand Therapy                     Assessment  Assessment details: Pt  Presents today s/p L thumb interpositional arthroplasty of the [facility] joint with MP joint pinning  Pt  Is 6 weeks post immobilization in a cast which was removed on 23 with pin  Pt  Is now transitioned to a comfort cool brace and tx to follow Weilby protocol per MD orders  Pt  Is not lifting heavy with L hand  Treatment to consist of ROM, edema control and progress to strengthening per protocol  Pt  Has opposition to 5th digit today and minimal pain  Impairments: abnormal or restricted ROM, impaired physical strength, lacks appropriate home exercise program and pain with function  Functional limitations: Pt  not lifting with L hand, unable to lift full cup of coffee  Pt  wearing splint with all activities per protocol  Understanding of Dx/Px/POC: good   Prognosis: good    Goals  STG( 6 visits)  1  Compliant with HEP/protocol  2  Reduce edema to [facility] for improved motion  3  Full opposition for function  LTG( 12 visits or discharge)  1  Improve FOTO score to predicted outcome or greater  2  Hold golf club pain free for RT sport  3  L /pinch strength WNLs for ADLs      Plan  Patient would benefit from: skilled occupational therapy  Planned modality interventions: cryotherapy and thermotherapy: hydrocollator packs  Planned therapy interventions: activity modification, joint mobilization, manual therapy, therapeutic exercise, therapeutic activities, stretching, strengthening, fine motor coordination training, home exercise program and graded activity  Frequency: 2x week  Duration in visits: 2  Duration in weeks: 12  Plan of Care beginning date: [FreeTextEntry1] : 76 y/o female with history as above.  Presenting to f/u for her chronic conditions  HTN - currently under control. Medical therapy reviewed Will continue with current regimen. Refill sent  DL - diet control. Low fat, low cholesterol diet  Obesity (BMI 34) - Patient was advised about healthy lifestyle changes, including diet and exercise. Importance of sustained long-term weight loss was discussed, questions answered.   COPD - pulmonary follow-up. C/w Breo  Primary prevention.  F/u in 6-12 months    2023  Plan of Care expiration date: 2023  Treatment plan discussed with: patient        Subjective Evaluation    History of Present Illness  Mechanism of injury: surgery  Mechanism of injury: S/P Left thumb interpositional arthroplasty with pinning of the Metacarpophalangeal joint - Left and Application of thumb spica splint - Left on 2022  Quality of life: good    Pain  Current pain ratin  At best pain ratin  At worst pain ratin  Quality: discomfort  Relieving factors: ice  Progression: improved    Social Support  Lives in: multiple-level home  Lives with: spouse    Employment status: not working (retired)  Hand dominance: right      Diagnostic Tests  X-ray: normal  Treatments  Current treatment: occupational therapy  Patient Goals  Patient goals for therapy: decreased pain, increased motion, independence with ADLs/IADLs and increased strength          Objective     Tenderness     Left Wrist/Hand   Tenderness in the [facility]       Neurological Testing     Sensation     Wrist/Hand   Left   Intact: light touch    Active Range of Motion     Left Wrist   Wrist flexion: 15 degrees   Wrist extension: 30 degrees   Radial deviation: 10 degrees   Ulnar deviation: 15 degrees     Left Thumb   Flexion     CMC: 20 degrees    MP: 50 degrees    DIP: 50 degrees  Extension     CMC: 0 degrees    MP: 0 degrees    DIP: 0 degrees  Radial abduction    CMC: 50 degrees  Opposition: Opposes to tip of 5th digit    Additional Active Range of Motion Details  Full composite fist    Strength/Myotome Testing     Additional Strength Details  TBA at 6 weeks    Swelling     Left Wrist/Hand   Thumb     Proximal: 10 cm  Circumference wrist: 20 5 cm             Precautions: Weilby protocol 22    Manuals              IE 30'            retrograde 4m            Graston L 4m            STM thenar 3m            Scar mob 2m                         HEP- thumb A/AAROM reviewed Ther Ex             Wrist A/AAROM 2m            Wrist curls                                                                                           Ther Activity             opposition x30            Peg  x30            Gait Training                                       Modalities              L 8m            CP post 5m              Weilby Post op Protocol     Precautions Maintain webspace, no CMC Adduction x 6wks  -2 wks - Comfort Cool    Begin ROM, edema control, scar management , fine motor   -6wks - begin strengthening

## 2024-03-26 ENCOUNTER — CONSULT (OUTPATIENT)
Dept: NEUROSURGERY | Facility: CLINIC | Age: 69
End: 2024-03-26
Payer: COMMERCIAL

## 2024-03-26 VITALS
RESPIRATION RATE: 17 BRPM | WEIGHT: 290 LBS | HEIGHT: 75 IN | OXYGEN SATURATION: 95 % | SYSTOLIC BLOOD PRESSURE: 144 MMHG | DIASTOLIC BLOOD PRESSURE: 88 MMHG | TEMPERATURE: 97.3 F | HEART RATE: 65 BPM | BODY MASS INDEX: 36.06 KG/M2

## 2024-03-26 DIAGNOSIS — M41.9 SCOLIOSIS: Primary | ICD-10-CM

## 2024-03-26 DIAGNOSIS — M48.02 FORAMINAL STENOSIS OF CERVICAL REGION: ICD-10-CM

## 2024-03-26 DIAGNOSIS — M48.02 CERVICAL SPINAL STENOSIS: ICD-10-CM

## 2024-03-26 DIAGNOSIS — M54.12 CERVICAL RADICULOPATHY: ICD-10-CM

## 2024-03-26 DIAGNOSIS — M47.812 CERVICAL SPONDYLOSIS: ICD-10-CM

## 2024-03-26 PROCEDURE — 99243 OFF/OP CNSLTJ NEW/EST LOW 30: CPT | Performed by: NEUROLOGICAL SURGERY

## 2024-03-26 NOTE — PROGRESS NOTES
DISCUSSION SUMMARY   This is a 68 y.o. male with cervical spondylosis, a reversal of the normal lordosis and degenerative disc disease.  He has had some success with epidural steroids in the past and I recommended another injection.  He should follow this injection up with physical therapy through formal program.  If these are ineffective then it is likely he will need surgical correction which will involve a multilevel fusion.  Will see him back in the office depending upon the results of these conservative measures    Return if symptoms worsen or fail to improve.      Diagnosis ICD-10-CM Associated Orders   1. Scoliosis  M41.9       2. Cervical radiculopathy  M54.12 Ambulatory referral to Neurosurgery     Ambulatory referral to Spine & Pain Management     Ambulatory referral to Physical Therapy      3. Foraminal stenosis of cervical region  M48.02 Ambulatory referral to Neurosurgery     Ambulatory referral to Spine & Pain Management     Ambulatory referral to Physical Therapy      4. Cervical spinal stenosis  M48.02 Ambulatory referral to Neurosurgery     Ambulatory referral to Spine & Pain Management     Ambulatory referral to Physical Therapy      5. Cervical spondylosis  M47.812 Ambulatory referral to Neurosurgery     Ambulatory referral to Spine & Pain Management     Ambulatory referral to Physical Therapy           Chief Complaint   Patient presents with    Consult        HPI this is a 68-year-old gentleman who complains of ringing in his right ear.  This occurs on a regular basis.  He also complains of severe neck pain with pain radiating into his shoulders which has been daily for the past 10 years and this has been getting worse.  He does exercise on a regular basis with minimal resolution of the discomfort.  He did undergo an epidural steroid injection in May 2023 and July 2023.  He has helped with his discomfort.  The pain often wakes him from his sleep.    Review of Systems   Constitutional:  Negative  "for fatigue.   HENT:  Positive for tinnitus (right ear ringing, daily). Negative for sinus pressure, sinus pain and trouble swallowing.    Eyes:  Negative for visual disturbance.        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        Pt exercises at gym twice a week w/    Neurological:  Negative for dizziness, weakness, light-headedness, numbness and headaches.        Hx CINDY w/ SL 5/2023, 7/2023   Psychiatric/Behavioral:  Positive for sleep disturbance (occasional difficulty due to pain).    All other systems reviewed and are negative.  I reviewed the ROS    Vitals:    /88 (BP Location: Left arm, Patient Position: Sitting)   Pulse 65   Temp (!) 97.3 °F (36.3 °C) (Temporal)   Resp 17   Ht 6' 3\" (1.905 m)   Wt 132 kg (290 lb)   SpO2 95%   BMI 36.25 kg/m²     MEDICAL HISTORY  Past Medical History:   Diagnosis Date    Anxiety     Atrial fibrillation (HCC)     Colon polyp     CPAP (continuous positive airway pressure) dependence     Has not been using    GERD (gastroesophageal reflux disease)     Hypercholesterolemia     Hypertension     Sleep apnea     not using CPAP     Past Surgical History:   Procedure Laterality Date    ABDOMINAL SURGERY      CAST APPLICATION Left 12/1/2022    Procedure: Application of thumb spica splint;  Surgeon: Papi Molina MD;  Location:  MAIN OR;  Service: Orthopedics    COLONOSCOPY      July 2019:  Sessile serrated adenoma  June 2014, hyperplastic polyps and internal hemorrhoids    ESOPHAGOGASTRODUODENOSCOPY      Irregular Z-line and gastritis biopsies negative for H pylori or Newton's    HYDROCELE EXCISION / REPAIR      [place] ARTHRP INTERPOS INTERCARPAL/METACARPAL JOINTS Right 7/14/2022    Procedure: right thumb interpositional arthroplasty;  " Surgeon: Papi Molina MD;  Location: [facility] MAIN OR;  Service: Orthopedics    UT ARTHRP INTERPOS INTERCARPAL/METACARPAL JOINTS Left 12/1/2022    Procedure: Left thumb interpositional arthroplasty with pinning of the Metacarpophalangeal joint;  Surgeon: Papi Molina MD;  Location: [facility] MAIN OR;  Service: Orthopedics    UMBILICAL HERNIA REPAIR      UPPER GASTROINTESTINAL ENDOSCOPY      October 2020: Irregular Z-line and gastritis, biopsies negative for EOE, Newton's, H pylori.   2008:  Negative for mass ulcer or Newton's    VOLVULUS REDUCTION  2015     Social History     Tobacco Use    Smoking status: Never    Smokeless tobacco: Never   Vaping Use    Vaping status: Never Used   Substance Use Topics    Alcohol use: Not Currently    Drug use: No      Family History   Problem Relation Age of Onset    Breast cancer Mother     Esophageal cancer Father     Cancer Father         Esophageal    Diabetes Family     Breast cancer Family     Esophageal cancer Family     Colon cancer Family     Colon cancer Brother     Colon cancer Brother     Diabetes Brother         Current Outpatient Medications:     amLODIPine (NORVASC) 10 mg tablet, Take 10 mg by mouth daily, Disp: , Rfl: 1    esomeprazole (NexIUM) 40 MG capsule, TAKE 1 CAPSULE (40 MG TOTAL) BY MOUTH DAILY., Disp: 90 capsule, Rfl: 1    losartan-hydrochlorothiazide (HYZAAR) 100-25 MG per tablet, Take 1 tablet by mouth daily, Disp: , Rfl: 0    metoprolol tartrate (LOPRESSOR) 50 mg tablet, Take 50 mg by mouth every 12 (twelve) hours, Disp: , Rfl:     PARoxetine (PAXIL) 20 mg tablet, Take 20 mg by mouth daily, Disp: , Rfl: 0    pravastatin (PRAVACHOL) 40 mg tablet, Take 40 mg by mouth daily, Disp: , Rfl: 0    rivaroxaban (XARELTO) 20 mg tablet, Take 20 mg by mouth daily with breakfast, Disp: , Rfl:      No Known Allergies     The following portions of the patient's history were updated by MA and reviewed by MD: allergies, current medications, past family history, past  medical history, past social history, past surgical history, and problem list.    Physical Exam    RESULTS/DATA  I have personally reviewed pertinent films in PACS  MRI of the cervical spine is carefully reviewed.  This demonstrates degenerative disc disease with a reversal of the normal lordosis in the setting of a cervical thoracic scoliosis.  This produces facet arthropathy with various degrees of foraminal narrowing.

## 2024-04-02 ENCOUNTER — TELEPHONE (OUTPATIENT)
Age: 69
End: 2024-04-02

## 2024-04-02 NOTE — TELEPHONE ENCOUNTER
Caller: Mal    Doctor: Meri    Reason for call: Pt is returning Schedulers call     Call back#: [phone]

## 2024-04-02 NOTE — TELEPHONE ENCOUNTER
Please schedule cervical epidural steroid injection and follow-up with NP/PA, diagnosis cervical spinal stenosis, cervical radiculitis, please make sure no signs of infection, antibiotics or blood thinners

## 2024-04-02 NOTE — TELEPHONE ENCOUNTER
Caller: Patient     Doctor:     Reason for call: Being referred by  for EDSI - cervical .    Patient is  established and would like to know if this can be scheduled.    Please advise       Call back#: [phone]

## 2024-04-03 NOTE — TELEPHONE ENCOUNTER
anti coag:   Faxed anti coag hold form to Dr. Trotter   Requested Medication to be held: Xarelto  Phone: [phone]  Fax: [phone]  Procedure to be scheduled: CARRIE   ??  *please keep this task in clinical pool until we get response back. (if forwarding this task to another office or physician, please keep clinical pool on the recipient list for tracking purposes)

## 2024-04-03 NOTE — TELEPHONE ENCOUNTER
Caller: candy Resendez    Doctor: Meri    Reason for call: pt returning schedulers call    Call back#: [phone]

## 2024-04-09 DIAGNOSIS — Z00.6 ENCOUNTER FOR EXAMINATION FOR NORMAL COMPARISON OR CONTROL IN CLINICAL RESEARCH PROGRAM: ICD-10-CM

## 2024-04-10 ENCOUNTER — APPOINTMENT (OUTPATIENT)
Dept: LAB | Facility: CLINIC | Age: 69
End: 2024-04-10

## 2024-04-10 DIAGNOSIS — Z00.6 ENCOUNTER FOR EXAMINATION FOR NORMAL COMPARISON OR CONTROL IN CLINICAL RESEARCH PROGRAM: ICD-10-CM

## 2024-04-10 PROCEDURE — 36415 COLL VENOUS BLD VENIPUNCTURE: CPT

## 2024-04-10 NOTE — TELEPHONE ENCOUNTER
S/w Xena at Dr. Trotter's office. Dr. Trotter is out of the office until 4/17 and the hold form for xarelto is on his desk. Form will be faxed over once reviewed next week per Xena.  Nurse appreciative of call.

## 2024-04-10 NOTE — TELEPHONE ENCOUNTER
Caller: Nurse from Dr. Trotter office     Doctor: Meri    Reason for call: returning you phone call did not want to provide best call back number

## 2024-04-12 ENCOUNTER — HOSPITAL ENCOUNTER (OUTPATIENT)
Dept: CT IMAGING | Facility: HOSPITAL | Age: 69
Discharge: HOME | End: 2024-04-12
Payer: COMMERCIAL

## 2024-04-12 DIAGNOSIS — E78.00 ELEVATED CHOLESTEROL: ICD-10-CM

## 2024-04-12 PROCEDURE — 75571 CT HRT W/O DYE W/CA TEST: CPT

## 2024-04-19 ENCOUNTER — TELEPHONE (OUTPATIENT)
Age: 69
End: 2024-04-19

## 2024-04-19 NOTE — TELEPHONE ENCOUNTER
Patients GI provider:  Dr. Miller    Number to return call: [phone]    Reason for call: Pt calling stating he has results from a CT Calcium test for cardiology and would like to know what this part of findings mean.      EXTRACARDIAC FINDINGS: Partially imaged large right hepatic lobe cyst and other smaller hepatic cysts noted in the visualized upper abdomen.       Please have Dr. Miller review and return his call.    Scheduled procedure/appointment date if applicable: 7/9/24

## 2024-04-28 LAB
APOB+LDLR+PCSK9 GENE MUT ANL BLD/T: NOT DETECTED
BRCA1+BRCA2 DEL+DUP + FULL MUT ANL BLD/T: NOT DETECTED
MLH1+MSH2+MSH6+PMS2 GN DEL+DUP+FUL M: NOT DETECTED

## 2024-04-29 DIAGNOSIS — K76.89 HEPATIC CYST: Primary | ICD-10-CM

## 2024-04-29 NOTE — Clinical Note
Incidental finding liver cyst on patient's cardiac CT.  Will order ultrasound to visualize all of the liver and verify that these are benign, simple cysts with no suspicious lesions.

## 2024-05-09 ENCOUNTER — HOSPITAL ENCOUNTER (OUTPATIENT)
Dept: RADIOLOGY | Facility: CLINIC | Age: 69
Discharge: HOME | End: 2024-05-09

## 2024-05-09 DIAGNOSIS — M54.12 CERVICAL RADICULITIS: ICD-10-CM

## 2024-05-09 DIAGNOSIS — M48.02 CERVICAL SPINAL STENOSIS: ICD-10-CM

## 2024-05-13 ENCOUNTER — TELEPHONE (OUTPATIENT)
Age: 69
End: 2024-05-13

## 2024-05-29 ENCOUNTER — HOSPITAL ENCOUNTER (OUTPATIENT)
Dept: ULTRASOUND IMAGING | Facility: HOSPITAL | Age: 69
Discharge: HOME | End: 2024-05-29
Attending: INTERNAL MEDICINE
Payer: COMMERCIAL

## 2024-05-29 DIAGNOSIS — K76.89 HEPATIC CYST: ICD-10-CM

## 2024-05-29 PROCEDURE — 76705 ECHO EXAM OF ABDOMEN: CPT

## 2024-06-20 ENCOUNTER — HOSPITAL ENCOUNTER (OUTPATIENT)
Dept: RADIOLOGY | Facility: CLINIC | Age: 69
Discharge: HOME | End: 2024-06-20
Payer: COMMERCIAL

## 2024-06-20 VITALS
SYSTOLIC BLOOD PRESSURE: 137 MMHG | DIASTOLIC BLOOD PRESSURE: 90 MMHG | RESPIRATION RATE: 16 BRPM | TEMPERATURE: 97.3 F | HEART RATE: 71 BPM | OXYGEN SATURATION: 94 %

## 2024-06-20 PROCEDURE — 62321 NJX INTERLAMINAR CRV/THRC: CPT | Performed by: ANESTHESIOLOGY

## 2024-06-20 RX ORDER — METHYLPREDNISOLONE ACETATE 80 MG/ML
80 INJECTION, SUSPENSION INTRA-ARTICULAR; INTRALESIONAL; INTRAMUSCULAR; PARENTERAL; SOFT TISSUE ONCE
Status: COMPLETED | OUTPATIENT
Start: 2024-06-20 | End: 2024-06-20

## 2024-06-20 RX ADMIN — IOHEXOL 1 ML: 300 INJECTION, SOLUTION INTRAVENOUS at 09:36

## 2024-06-20 RX ADMIN — METHYLPREDNISOLONE ACETATE 80 MG: 80 INJECTION, SUSPENSION INTRA-ARTICULAR; INTRALESIONAL; INTRAMUSCULAR; SOFT TISSUE at 09:36

## 2024-06-20 NOTE — H&P
History of Present Illness: The patient is a 68 y.o. male who presents with complaints of neck and arm pain.    Past Medical History:   Diagnosis Date    Anxiety     Atrial fibrillation (HCC)     Colon polyp     CPAP (continuous positive airway pressure) dependence     Has not been using    GERD (gastroesophageal reflux disease)     Hypercholesterolemia     Hypertension     Sleep apnea     not using CPAP       Past Surgical History:   Procedure Laterality Date    ABDOMINAL SURGERY      CAST APPLICATION Left 12/1/2022    Procedure: Application of thumb spica splint;  Surgeon: Papi Molina MD;  Location:  MAIN OR;  Service: Orthopedics    COLONOSCOPY      July 2019:  Sessile serrated adenoma  June 2014, hyperplastic polyps and internal hemorrhoids    ESOPHAGOGASTRODUODENOSCOPY      Irregular Z-line and gastritis biopsies negative for H pylori or Newton's    HYDROCELE EXCISION / REPAIR      DE ARTHRP INTERPOS INTERCARPAL/METACARPAL JOINTS Right 7/14/2022    Procedure: right thumb interpositional arthroplasty;  Surgeon: Papi Molina MD;  Location:  MAIN OR;  Service: Orthopedics    DE ARTHRP INTERPOS INTERCARPAL/METACARPAL JOINTS Left 12/1/2022    Procedure: Left thumb interpositional arthroplasty with pinning of the Metacarpophalangeal joint;  Surgeon: Papi Molina MD;  Location:  MAIN OR;  Service: Orthopedics    UMBILICAL HERNIA REPAIR      UPPER GASTROINTESTINAL ENDOSCOPY      October 2020: Irregular Z-line and gastritis, biopsies negative for EOE, Newton's, H pylori.   2008:  Negative for mass ulcer or Newton's    VOLVULUS REDUCTION  2015         Current Outpatient Medications:     amLODIPine (NORVASC) 10 mg tablet, Take 10 mg by mouth daily, Disp: , Rfl: 1    esomeprazole (NexIUM) 40 MG capsule, TAKE 1 CAPSULE (40 MG TOTAL) BY MOUTH DAILY., Disp: 90 capsule, Rfl: 1    losartan-hydrochlorothiazide (HYZAAR) 100-25 MG per tablet, Take 1 tablet by mouth daily, Disp: , Rfl: 0    metoprolol  tartrate (LOPRESSOR) 50 mg tablet, Take 50 mg by mouth every 12 (twelve) hours, Disp: , Rfl:     PARoxetine (PAXIL) 20 mg tablet, Take 20 mg by mouth daily, Disp: , Rfl: 0    pravastatin (PRAVACHOL) 40 mg tablet, Take 40 mg by mouth daily, Disp: , Rfl: 0    rivaroxaban (XARELTO) 20 mg tablet, Take 20 mg by mouth daily with breakfast, Disp: , Rfl:     Current Facility-Administered Medications:     iohexol (OMNIPAQUE) 300 mg/mL injection 1 mL, 1 mL, Epidural, Once, Bjorn Jerez DO    methylPREDNISolone acetate (DEPO-MEDROL) injection 80 mg, 80 mg, Epidural, Once, Bjorn Jerez DO    No Known Allergies    Physical Exam:   General: Awake, Alert, Oriented x 3, Mood and affect appropriate  Respiratory: Respirations even and unlabored  Cardiovascular: Peripheral pulses intact; no edema  Musculoskeletal Exam: Decreased range of motion cervical spine    ASA Score: III         Assessment: Cervical radiculopathy    Plan: cervical epidural steroid injection [id number]

## 2024-06-20 NOTE — DISCHARGE INSTRUCTIONS
Epidural Steroid Injection   WHAT YOU NEED TO KNOW:   An epidural steroid injection (CINDY) is a procedure to inject steroid medicine into the epidural space. The epidural space is between your spinal cord and vertebrae. Steroids reduce inflammation and fluid buildup in your spine that may be causing pain. You may be given pain medicine along with the steroids.          ACTIVITY  Do not drive or operate machinery today.  No strenuous activity today - bending, lifting, etc.  You may resume normal activites starting tomorrow - start slowly and as tolerated.  You may shower today, but no tub baths or hot tubs.  You may have numbness for several hours from the local anesthetic. Please use caution and common sense, especially with weight-bearing activities.    CARE OF THE INJECTION SITE  If you have soreness or pain, apply ice to the area today (20 minutes on/20 minutes off).  Starting tomorrow, you may use warm, moist heat or ice if needed.  You may have an increase or change in your discomfort for 36-48 hours after your treatment.  Apply ice and continue with any pain medication you have been prescribed.  Notify the [facility] if you have any of the following: redness, drainage, swelling, headache, stiff neck or fever above 100°F.    SPECIAL INSTRUCTIONS  Our office will contact you in approximately 14 days for a progress report.    MEDICATIONS  Continue to take all routine medications.  Our office may have instructed you to hold some medications.  May resume Xarelto today 6/20/24    As no general anesthesia was used in today's procedure, you should not experience any side effects related to anesthesia.     If you are diabetic, the steroids used in today's injection may temporarily increase your blood sugar levels after the first few days after your injection. Please keep a close eye on your sugars and alert the doctor who manages your diabetes if your sugars are significantly high from your baseline or you  are symptomatic.     If you have a problem specifically related to your procedure, please call our office at [phone].  Problems not related to your procedure should be directed to your primary care physician.

## 2024-06-25 ENCOUNTER — ANESTHESIA EVENT (OUTPATIENT)
Dept: ANESTHESIOLOGY | Facility: AMBULATORY SURGERY CENTER | Age: 69
End: 2024-06-25

## 2024-06-25 ENCOUNTER — ANESTHESIA (OUTPATIENT)
Dept: ANESTHESIOLOGY | Facility: AMBULATORY SURGERY CENTER | Age: 69
End: 2024-06-25

## 2024-06-25 ENCOUNTER — TELEPHONE (OUTPATIENT)
Dept: GASTROENTEROLOGY | Facility: CLINIC | Age: 69
End: 2024-06-25

## 2024-06-25 NOTE — TELEPHONE ENCOUNTER
Procedure confirmed  Colonoscopy     Via: [facility]    Instructions given: Given to Patient at Visit     Prep Given: Miralax/Dulcolax    Call the office if there are any questions.      Last dose of xarelto 7/6/2024 - info provided in [employer] reminder message.    Voicemail left for patient re: last dose 7/6/2024

## 2024-07-03 ENCOUNTER — TELEPHONE (OUTPATIENT)
Dept: PAIN MEDICINE | Facility: CLINIC | Age: 69
End: 2024-07-03

## 2024-07-05 DIAGNOSIS — K21.9 GASTROESOPHAGEAL REFLUX DISEASE WITHOUT ESOPHAGITIS: ICD-10-CM

## 2024-07-05 RX ORDER — ESOMEPRAZOLE MAGNESIUM 40 MG/1
40 CAPSULE, DELAYED RELEASE ORAL DAILY
Qty: 100 CAPSULE | Refills: 1 | Status: SHIPPED | OUTPATIENT
Start: 2024-07-05

## 2024-07-08 ENCOUNTER — ANESTHESIA (OUTPATIENT)
Dept: ANESTHESIOLOGY | Facility: AMBULATORY SURGERY CENTER | Age: 69
End: 2024-07-08

## 2024-07-08 ENCOUNTER — ANESTHESIA EVENT (OUTPATIENT)
Dept: ANESTHESIOLOGY | Facility: AMBULATORY SURGERY CENTER | Age: 69
End: 2024-07-08

## 2024-07-09 ENCOUNTER — ANESTHESIA (OUTPATIENT)
Dept: GASTROENTEROLOGY | Facility: AMBULATORY SURGERY CENTER | Age: 69
End: 2024-07-09

## 2024-07-09 ENCOUNTER — HOSPITAL ENCOUNTER (OUTPATIENT)
Dept: GASTROENTEROLOGY | Facility: AMBULATORY SURGERY CENTER | Age: 69
Discharge: HOME | End: 2024-07-09
Payer: COMMERCIAL

## 2024-07-09 ENCOUNTER — ANESTHESIA EVENT (OUTPATIENT)
Dept: GASTROENTEROLOGY | Facility: AMBULATORY SURGERY CENTER | Age: 69
End: 2024-07-09

## 2024-07-09 VITALS
OXYGEN SATURATION: 98 % | RESPIRATION RATE: 20 BRPM | HEIGHT: 75 IN | DIASTOLIC BLOOD PRESSURE: 78 MMHG | WEIGHT: 289 LBS | TEMPERATURE: 98.1 F | SYSTOLIC BLOOD PRESSURE: 149 MMHG | HEART RATE: 66 BPM | BODY MASS INDEX: 35.93 KG/M2

## 2024-07-09 DIAGNOSIS — Z80.0 FAMILY HISTORY OF COLON CANCER: ICD-10-CM

## 2024-07-09 DIAGNOSIS — Z86.010 HISTORY OF COLON POLYPS: ICD-10-CM

## 2024-07-09 PROCEDURE — 88305 TISSUE EXAM BY PATHOLOGIST: CPT | Performed by: PATHOLOGY

## 2024-07-09 PROCEDURE — 45380 COLONOSCOPY AND BIOPSY: CPT | Performed by: INTERNAL MEDICINE

## 2024-07-09 PROCEDURE — 45385 COLONOSCOPY W/LESION REMOVAL: CPT | Performed by: INTERNAL MEDICINE

## 2024-07-09 RX ORDER — PROPOFOL 10 MG/ML
INJECTION, EMULSION INTRAVENOUS AS NEEDED
Status: DISCONTINUED | OUTPATIENT
Start: 2024-07-09 | End: 2024-07-09

## 2024-07-09 RX ORDER — SODIUM CHLORIDE, SODIUM LACTATE, POTASSIUM CHLORIDE, CALCIUM CHLORIDE 600; 310; 30; 20 MG/100ML; MG/100ML; MG/100ML; MG/100ML
50 INJECTION, SOLUTION INTRAVENOUS CONTINUOUS
Status: DISCONTINUED | OUTPATIENT
Start: 2024-07-09 | End: 2024-07-13 | Stop reason: HOSPADM

## 2024-07-09 RX ADMIN — PROPOFOL 100 MG: 10 INJECTION, EMULSION INTRAVENOUS at 07:35

## 2024-07-09 RX ADMIN — SODIUM CHLORIDE, SODIUM LACTATE, POTASSIUM CHLORIDE, CALCIUM CHLORIDE: 600; 310; 30; 20 INJECTION, SOLUTION INTRAVENOUS at 07:57

## 2024-07-09 RX ADMIN — SODIUM CHLORIDE, SODIUM LACTATE, POTASSIUM CHLORIDE, CALCIUM CHLORIDE 50 ML/HR: 600; 310; 30; 20 INJECTION, SOLUTION INTRAVENOUS at 07:09

## 2024-07-09 RX ADMIN — PROPOFOL 100 MG: 10 INJECTION, EMULSION INTRAVENOUS at 07:39

## 2024-07-09 RX ADMIN — PROPOFOL 100 MG: 10 INJECTION, EMULSION INTRAVENOUS at 07:33

## 2024-07-09 RX ADMIN — PROPOFOL 100 MG: 10 INJECTION, EMULSION INTRAVENOUS at 07:31

## 2024-07-09 RX ADMIN — PROPOFOL 100 MG: 10 INJECTION, EMULSION INTRAVENOUS at 07:47

## 2024-07-09 NOTE — ANESTHESIA POSTPROCEDURE EVALUATION
Post-Op Assessment Note    CV Status:  Stable  Pain Score: 0    Pain management: adequate       Mental Status:  Alert and awake   Hydration Status:  Euvolemic   PONV Controlled:  Controlled   Airway Patency:  Patent     Post Op Vitals Reviewed: Yes    No anethesia notable event occurred.    Staff: CRNA               BP   123/73   Temp   98   Pulse  75   Resp   16   SpO2   97

## 2024-07-09 NOTE — ANESTHESIA PREPROCEDURE EVALUATION
Procedure:  COLONOSCOPY    Relevant Problems   CARDIO   (+) A-fib (HCC)   (+) HTN (hypertension)   (+) Hyperlipidemia      GI/HEPATIC   (+) Gastroesophageal reflux disease without esophagitis      MUSCULOSKELETAL   (+) Arthritis   (+) Arthritis of carpometacarpal (CMC) joint of left thumb   (+) Cervical spondylosis   (+) Idiopathic osteoarthritis   (+) Scoliosis      NEURO/PSYCH   (+) Anxiety   (+) Chronic pain syndrome      PULMONARY   (+) MARY (obstructive sleep apnea)        Physical Exam    Airway  Comment: Thick neck with redundant tissue   Mallampati score: III  TM Distance: >3 FB  Neck ROM: full     Dental   Comment: None loose, No notable dental hx     Cardiovascular      Pulmonary      Other Findings        Anesthesia Plan  ASA Score- 3     Anesthesia Type- IV sedation with anesthesia with ASA Monitors.         Additional Monitors:     Airway Plan:     Comment: Last of PO bowel prep: 03:00    Patient educated on the possibility for awareness under sedation and of the possibility of airway intervention in the event of an airway or procedural emergency    Xarelto appropriately held  .       Plan Factors-Exercise tolerance (METS): >4 METS.    Chart reviewed.    Patient summary reviewed.    Patient is not a current smoker.              Induction- intravenous.    Postoperative Plan-         Informed Consent- Anesthetic plan and risks discussed with patient.  I personally reviewed this patient with the CRNA. Discussed and agreed on the Anesthesia Plan with the CRNA..

## 2024-07-09 NOTE — ANESTHESIA PREPROCEDURE EVALUATION
Procedure:  PRE-OP ONLY    Relevant Problems   CARDIO   (+) A-fib (HCC)   (+) HTN (hypertension)   (+) Hyperlipidemia      GI/HEPATIC   (+) Gastroesophageal reflux disease without esophagitis      MUSCULOSKELETAL   (+) Cervical spondylosis   (+) Scoliosis      NEURO/PSYCH   (+) Anxiety   (+) Chronic pain syndrome      PULMONARY   (+) MARY (obstructive sleep apnea)             Anesthesia Plan  ASA Score- 3     Anesthesia Type- IV sedation with anesthesia with ASA Monitors.         Additional Monitors:     Airway Plan:     Comment: Last of PO bowel prep:    Patient educated on the possibility for awareness under sedation and of the possibility of airway intervention in the event of an airway or procedural emergency  .       Plan Factors-    Chart reviewed.    Patient summary reviewed.                  Induction- intravenous.    Postoperative Plan-         Informed Consent- Anesthetic plan and risks discussed with patient.  I personally reviewed this patient with the CRNA. Discussed and agreed on the Anesthesia Plan with the CRNA..

## 2024-07-09 NOTE — H&P
History and Physical - [facility]  Umer Aceves Jr. 68 y.o. male MRN: [id number]    HPI: Umer Aceves Jr. is a 68 y.o. year old male who presents for surveillance colonoscopy for history of polyps    REVIEW OF SYSTEMS: Per the HPI, and otherwise unremarkable.    Historical Information   Past Medical History:   Diagnosis Date    Anxiety     Atrial fibrillation (HCC)     Colon polyp     CPAP (continuous positive airway pressure) dependence     Has not been using    GERD (gastroesophageal reflux disease)     Hypercholesterolemia     Hypertension     Sleep apnea     not using CPAP     Past Surgical History:   Procedure Laterality Date    ABDOMINAL SURGERY      CAST APPLICATION Left 12/01/2022    Procedure: Application of thumb spica splint;  Surgeon: Papi Molina MD;  Location:  MAIN OR;  Service: Orthopedics    COLONOSCOPY      July 2019:  Sessile serrated adenoma  June 2014, hyperplastic polyps and internal hemorrhoids    ESOPHAGOGASTRODUODENOSCOPY      Irregular Z-line and gastritis biopsies negative for H pylori or Newton's    HYDROCELE EXCISION / REPAIR      JOINT REPLACEMENT Right     knee    ME ARTHRP INTERPOS INTERCARPAL/METACARPAL JOINTS Right 07/14/2022    Procedure: right thumb interpositional arthroplasty;  Surgeon: Papi Molina MD;  Location:  MAIN OR;  Service: Orthopedics    ME ARTHRP INTERPOS INTERCARPAL/METACARPAL JOINTS Left 12/01/2022    Procedure: Left thumb interpositional arthroplasty with pinning of the Metacarpophalangeal joint;  Surgeon: Papi Molina MD;  Location:  MAIN OR;  Service: Orthopedics    UMBILICAL HERNIA REPAIR      UPPER GASTROINTESTINAL ENDOSCOPY      October 2020: Irregular Z-line and gastritis, biopsies negative for EOE, Newton's, H pylori.   2008:  Negative for mass ulcer or Newton's    VOLVULUS REDUCTION  2015     Social History   Social History     Substance and Sexual Activity   Alcohol Use Not Currently     Social History  "    Substance and Sexual Activity   Drug Use No     Social History     Tobacco Use   Smoking Status Never   Smokeless Tobacco Never     Family History   Problem Relation Age of Onset    Breast cancer Mother     Esophageal cancer Father     Cancer Father         Esophageal    Diabetes Family     Breast cancer Family     Esophageal cancer Family     Colon cancer Family     Colon cancer Brother     Colon cancer Brother     Diabetes Brother        Meds/Allergies       Current Outpatient Medications:     amLODIPine (NORVASC) 10 mg tablet    esomeprazole (NexIUM) 40 MG capsule    losartan-hydrochlorothiazide (HYZAAR) 100-25 MG per tablet    metoprolol tartrate (LOPRESSOR) 50 mg tablet    PARoxetine (PAXIL) 20 mg tablet    pravastatin (PRAVACHOL) 40 mg tablet    rivaroxaban (XARELTO) 20 mg tablet    Current Facility-Administered Medications:     lactated ringers infusion, 50 mL/hr, Intravenous, Continuous, 50 mL/hr at 07/09/24 0709    No Known Allergies    Objective     /87   Pulse 80   Temp 98.1 °F (36.7 °C) (Temporal)   Resp 14   Ht 6' 3\" (1.905 m)   Wt 131 kg (289 lb)   SpO2 96%   BMI 36.12 kg/m²     PHYSICAL EXAM    Gen: NAD AAOx3  Head: Normocephalic, Atraumatic  CV: S1S2 RRR no m/r/g  CHEST: Clear b/l no c/r/w  ABD: soft, +BS NT/ND  EXT: no edema    ASSESSMENT/PLAN:  This is a 68 y.o. year old male here for colonoscopy, and he is stable and optimized for his procedure.        "

## 2024-07-11 ENCOUNTER — EVALUATION (OUTPATIENT)
Dept: PHYSICAL THERAPY | Facility: CLINIC | Age: 69
End: 2024-07-11
Payer: COMMERCIAL

## 2024-07-11 DIAGNOSIS — M48.02 FORAMINAL STENOSIS OF CERVICAL REGION: ICD-10-CM

## 2024-07-11 DIAGNOSIS — M47.812 CERVICAL SPONDYLOSIS: ICD-10-CM

## 2024-07-11 DIAGNOSIS — M54.12 CERVICAL RADICULOPATHY: ICD-10-CM

## 2024-07-11 DIAGNOSIS — M48.02 CERVICAL SPINAL STENOSIS: ICD-10-CM

## 2024-07-11 PROCEDURE — 88305 TISSUE EXAM BY PATHOLOGIST: CPT | Performed by: PATHOLOGY

## 2024-07-11 PROCEDURE — 97530 THERAPEUTIC ACTIVITIES: CPT | Performed by: PHYSICAL THERAPIST

## 2024-07-11 PROCEDURE — 97161 PT EVAL LOW COMPLEX 20 MIN: CPT | Performed by: PHYSICAL THERAPIST

## 2024-07-11 PROCEDURE — 97112 NEUROMUSCULAR REEDUCATION: CPT | Performed by: PHYSICAL THERAPIST

## 2024-07-11 NOTE — PROGRESS NOTES
PT Evaluation     Today's date: 2024  Patient name: Umer Aceves Jr.  : 1955  MRN: [id number]  Referring provider: Jorge Luis Davis,*  Dx:   Encounter Diagnosis     ICD-10-CM    1. Cervical radiculopathy  M54.12 Ambulatory referral to Physical Therapy      2. Foraminal stenosis of cervical region  M48.02 Ambulatory referral to Physical Therapy      3. Cervical spinal stenosis  M48.02 Ambulatory referral to Physical Therapy      4. Cervical spondylosis  M47.812 Ambulatory referral to Physical Therapy          Assessment  Impairments: abnormal or restricted ROM, activity intolerance, impaired physical strength and pain with function  Symptom irritability: low    Assessment details: Umer Aceves Jr. is a 68 y.o. male presenting to outpatient physical therapy at [facility] with complaints of cervical pain, stiffness and weakness.  He presents with decreased range of motion, decreased strength, limited flexibility, poor postural awareness, poor body mechanics, decreased tolerance to activity and decreased functional mobility due to Cervical radiculopathy  Foraminal stenosis of cervical region  Cervical spinal stenosis  Cervical spondylosis.  He would benefit from skilled PT services in order to address these deficits and reach maximum level of function.  Thank you for the referral!    Understanding of Dx/Px/POC: excellent     Prognosis: excellent    Goals  STG  1. Independent with HEP in 2 weeks  2. Decrease pain at worst by 50% in 2 weeks    LTG  1. Increase cervicothoracic strength in all planes to 5/5 in 4 weeks  2. Return to full, pain-free and unrestricted golfing in 4 weeks        Plan  Patient would benefit from: skilled physical therapy  Planned modality interventions: thermotherapy: hydrocollator packs    Planned therapy interventions: manual therapy, neuromuscular re-education, therapeutic exercise and therapeutic activities    Frequency: 1x week  Duration in weeks: 4  Treatment plan  discussed with: patient    Subjective Evaluation    History of Present Illness  Mechanism of injury: Pt reports chronic history of cervical pain, stiffness and weakness with insidious onset. PT reports repeated R cervical rotation motions for many years at work. PMH includes nerve ablation, injections. Patient denies pain with coughing/sneezing. Patient denies pain at night, sleeps with two pillows, supine. PMH CA.   Quality of life: good    Patient Goals  Patient goal: drive  without pain  Pain  Current pain ratin  At best pain ratin  At worst pain rating: 10  Location: L cervical  Quality: sharp and radiating  Relieving factors: heat  Exacerbated by: driving riding mower.  Progression: improved      Diagnostic Tests  X-ray: abnormal  MRI studies: abnormal  Treatments  Previous treatment: injection treatment, chiropractic and massage    Objective     Active Range of Motion   Cervical/Thoracic Spine       Cervical    Flexion: 45 degrees   Extension: 20 degrees     with pain  Left lateral flexion: 30 degrees      Right lateral flexion: 20 degrees     with pain  Left rotation: 30 degrees with pain  Right rotation: 60 degrees       Strength/Myotome Testing   Cervical Spine   Neck extension: 5  Neck flexion: 5    Left Shoulder     Planes of Motion   Extension: 5   Abduction: 5   Adduction: 5   External rotation at 0°: 5   Internal rotation at 0°: 5     Isolated Muscles   Lower trapezius: 4   Middle trapezius: 4   Upper trapezius: 5     Right Shoulder     Planes of Motion   Extension: 5   Abduction: 5   Adduction: 5   External rotation at 0°: 5     Isolated Muscles   Lower trapezius: 4   Middle trapezius: 4   Upper trapezius: 5     Left Elbow   Flexion: 5  Extension: 5    Right Elbow   Flexion: 5  Extension: 5    Tests   Cervical   Positive neck flexor muscle endurance test.  Negative repeated extension, repeated flexion, alar ligament test and Sharp-Jocelyn test.     Left   Positive Spurling's Test A,  "Spurling's Test B and cervical flexion-rotation test .     Right   Negative Spurling's Test A, Spurling's Test B and cervical flexion-rotation test.     Thoracic   Negative slump test.            EPOC: 8/11/2024      Manuals 7/11            STM L LS & rhomboid mm             Cervical PROM                                       Neuro Re-Ed             Scapular retraction iso 10x10\"            Thoracic ext self mob in chair 10x10\"             Chin tuck iso             Mid row iso             Shld ext iso             Habd Tband iso                          Ther Ex             Doorway pec stretch             Cervical LEFT rotation with pt OP                                                                                           Ther Activity             Patient education: pathoanatomy, nature of sxs, POC, HEP NS 15'             UBE w/u              Gait Training                                       Modalities                                            "

## 2024-07-16 NOTE — RESULT ENCOUNTER NOTE
"To: Umer Aceves Jr.    The polyps removed during your colonoscopy were adenomas, which are \"precancerous\", but completely benign.  Repeat colonoscopy is recommended in 3 years.  Best regards,    Lore Miller MD     "

## 2024-07-18 ENCOUNTER — APPOINTMENT (OUTPATIENT)
Dept: PHYSICAL THERAPY | Facility: CLINIC | Age: 69
End: 2024-07-18
Payer: COMMERCIAL

## 2024-07-19 ENCOUNTER — OFFICE VISIT (OUTPATIENT)
Dept: PHYSICAL THERAPY | Facility: CLINIC | Age: 69
End: 2024-07-19
Payer: COMMERCIAL

## 2024-07-19 DIAGNOSIS — M47.812 CERVICAL SPONDYLOSIS: ICD-10-CM

## 2024-07-19 DIAGNOSIS — M54.12 CERVICAL RADICULOPATHY: Primary | ICD-10-CM

## 2024-07-19 DIAGNOSIS — M48.02 CERVICAL SPINAL STENOSIS: ICD-10-CM

## 2024-07-19 DIAGNOSIS — M48.02 FORAMINAL STENOSIS OF CERVICAL REGION: ICD-10-CM

## 2024-07-19 PROCEDURE — 97530 THERAPEUTIC ACTIVITIES: CPT

## 2024-07-19 PROCEDURE — 97112 NEUROMUSCULAR REEDUCATION: CPT

## 2024-07-19 PROCEDURE — 97140 MANUAL THERAPY 1/> REGIONS: CPT

## 2024-07-19 NOTE — PROGRESS NOTES
"Daily Note     Today's date: 2024  Patient name: Umer Aceves Jr.  : 1955  MRN: [id number]  Referring provider: Jorge Luis Davis,*  Dx:   Encounter Diagnosis     ICD-10-CM    1. Cervical radiculopathy  M54.12       2. Cervical spinal stenosis  M48.02       3. Foraminal stenosis of cervical region  M48.02       4. Cervical spondylosis  M47.812           Start Time: 1513  Stop Time: 1555  Total time in clinic (min): 42 minutes    Subjective: Patient reports that he has been keeping up with HEP several times per day but not every hour.  He states that he will be cutting the grass tomorrow so he will be able to assess how his neck is responding to therapy.      Objective: See treatment diary below      Assessment: Patient demonstrates improving cervical and thoracic mobility this session.  Patient completed today's session without increase in pain.  UBE performed in order to improve UE muscular endurance.  Recreation of L scapula pain with flexion and R rotation in session.  L rotation ROM remains limited compared to R.  Future sessions should continue to progress lower cervical and upper thoracic mobility exercises as able. Tolerated treatment well. Patient would benefit from continued PT.      Plan: Continue per plan of care.      EPOC: 2024      Manuals            STM L LS & rhomboid mm  5 min           Cervical PROM             Lower cervical upper thoracic PA mobs  5' Gr III                        Neuro Re-Ed             Scapular retraction iso 10x10\" x20           Thoracic ext self mob in chair 10x10\"  X20           Chin tuck iso  Seated x20           Mid row iso  BTB  X20 5\"           Shld ext iso  BTB  X20 5\"           Habd Tband iso                          Ther Ex             Doorway pec stretch  10x10\"           Cervical LEFT rotation with pt OP  SNAG  X20 3\"                                                                                         Ther Activity           "   Patient education: pathoanatomy, nature of sxs, POC, HEP NS 15'             UBE w/u   3'3'           Gait Training                                       Modalities

## 2024-07-26 ENCOUNTER — OFFICE VISIT (OUTPATIENT)
Dept: PHYSICAL THERAPY | Facility: CLINIC | Age: 69
End: 2024-07-26
Payer: COMMERCIAL

## 2024-07-26 DIAGNOSIS — M47.812 CERVICAL SPONDYLOSIS: ICD-10-CM

## 2024-07-26 DIAGNOSIS — M48.02 CERVICAL SPINAL STENOSIS: ICD-10-CM

## 2024-07-26 DIAGNOSIS — M54.12 CERVICAL RADICULOPATHY: Primary | ICD-10-CM

## 2024-07-26 DIAGNOSIS — M48.02 FORAMINAL STENOSIS OF CERVICAL REGION: ICD-10-CM

## 2024-07-26 PROCEDURE — 97112 NEUROMUSCULAR REEDUCATION: CPT | Performed by: PHYSICAL THERAPIST

## 2024-07-26 PROCEDURE — 97110 THERAPEUTIC EXERCISES: CPT | Performed by: PHYSICAL THERAPIST

## 2024-07-26 NOTE — PROGRESS NOTES
"Daily Note     Today's date: 2024  Patient name: Umer Aceves Jr.  : 1955  MRN: [id number]  Referring provider: Jorge Luis Davis,*  Dx:   Encounter Diagnosis     ICD-10-CM    1. Cervical radiculopathy  M54.12       2. Cervical spinal stenosis  M48.02       3. Foraminal stenosis of cervical region  M48.02       4. Cervical spondylosis  M47.812         Subjective: Patient reports that he was able to mow lawn with minimal to no exacerbation of symptoms     Objective: See treatment diary below    Assessment: Patient demonstrates improving cervical and thoracic mobility this session.  Patient completed today's session without increase in pain.  UBE performed in order to improve UE muscular endurance.  Future sessions should continue to progress lower cervical and upper thoracic mobility exercises as able. Tolerated treatment well. Patient would benefit from continued PT.    Plan: Continue per plan of care.      EPOC: 2024      Manuals           STM L LS & rhomboid mm  5 min nv          Cervical PROM             Lower cervical upper thoracic PA mobs  5' Gr III 5' G4                       Neuro Re-Ed             Scapular retraction iso 10x10\" x20 20x           Thoracic ext self mob in chair 10x10\"  X20 x20          Chin tuck iso  Seated x20 Seated x20          Mid row iso  BTB  X20 5\" Ptb x30          Shld ext iso  BTB  X20 5\" BTB x30          Habd Tband iso                          Ther Ex             Doorway pec stretch  10x10\" 10x10\"           Cervical LEFT rotation with pt OP  SNAG  X20 3\" nv                                                                                        Ther Activity             Patient education: pathoanatomy, nature of sxs, POC, HEP NS 15'   NS          UBE w/u   3'3' 3'/3'          Gait Training                                       Modalities                                            "

## 2024-08-02 ENCOUNTER — OFFICE VISIT (OUTPATIENT)
Dept: PHYSICAL THERAPY | Facility: CLINIC | Age: 69
End: 2024-08-02
Payer: COMMERCIAL

## 2024-08-02 DIAGNOSIS — M48.02 CERVICAL SPINAL STENOSIS: ICD-10-CM

## 2024-08-02 DIAGNOSIS — M48.02 FORAMINAL STENOSIS OF CERVICAL REGION: ICD-10-CM

## 2024-08-02 DIAGNOSIS — M54.12 CERVICAL RADICULOPATHY: Primary | ICD-10-CM

## 2024-08-02 PROCEDURE — 97140 MANUAL THERAPY 1/> REGIONS: CPT

## 2024-08-02 PROCEDURE — 97112 NEUROMUSCULAR REEDUCATION: CPT

## 2024-08-02 PROCEDURE — 97110 THERAPEUTIC EXERCISES: CPT

## 2024-08-02 NOTE — PROGRESS NOTES
"Daily Note     Today's date: 2024  Patient name: Umer Aceves Jr.  : 1955  MRN: [id number]  Referring provider: Jorge Luis Davis,*  Dx:   Encounter Diagnosis     ICD-10-CM    1. Cervical radiculopathy  M54.12       2. Foraminal stenosis of cervical region  M48.02       3. Cervical spinal stenosis  M48.02                      Subjective: Current Status: Pt reports no new plan issues w/ his cspine.  Reports he feels his L rotation is tighter than the L.  New Symptoms/ Problems: NA  Response to Last Treatment: Good response, no issues post.  HEP/ Activity Recommendations:  keep cspine moving to prevent stifness.  Progress Towards Goals: increase tspine and spine mobility working towards pt's golf swing.          Objective: See treatment diary below      Assessment: Tolerated treatment well. Patient exhibited good technique with therapeutic exercises and would benefit from continued PT for work on tspine and cspine mobility.  Pt w/ limited PROM to L.        Plan: Continue per plan of care.  Progress treatment as tolerated.       EPOC: 2024      Manuals          STM L LS & rhomboid mm  5 min nv JK         Cervical PROM    JK         Lower cervical upper thoracic PA mobs  5' Gr III 5' G4                       Neuro Re-Ed             Scapular retraction iso 10x10\" x20 20x  20x          Thoracic ext self mob in chair 10x10\"  X20 x20 10x10\"          Chin tuck iso  Seated x20 Seated x20 Seated x20         Mid row iso  BTB  X20 5\" Ptb x30 Ptb x30         Shld ext iso  BTB  X20 5\" BTB x30 BTB x30         Habd Tband iso                          Ther Ex             Doorway pec stretch  10x10\" 10x10\"  10x10\"          Cervical LEFT rotation with pt OP  SNAG  X20 3\" nv SNAG  X20 3\"                                                                                       Ther Activity             Patient education: pathoanatomy, nature of sxs, POC, HEP NS 15'   NS Jk 3'         UBE w/u   3'3' " 3'/3' 3'/3'         Gait Training                                       Modalities

## 2024-08-09 ENCOUNTER — APPOINTMENT (OUTPATIENT)
Dept: PHYSICAL THERAPY | Facility: CLINIC | Age: 69
End: 2024-08-09
Payer: COMMERCIAL

## 2024-08-16 ENCOUNTER — APPOINTMENT (OUTPATIENT)
Dept: PHYSICAL THERAPY | Facility: CLINIC | Age: 69
End: 2024-08-16
Payer: COMMERCIAL

## 2024-08-22 ENCOUNTER — OFFICE VISIT (OUTPATIENT)
Dept: PAIN MEDICINE | Facility: CLINIC | Age: 69
End: 2024-08-22
Payer: COMMERCIAL

## 2024-08-22 VITALS
WEIGHT: 298 LBS | TEMPERATURE: 98.3 F | HEIGHT: 75 IN | HEART RATE: 64 BPM | DIASTOLIC BLOOD PRESSURE: 82 MMHG | SYSTOLIC BLOOD PRESSURE: 132 MMHG | BODY MASS INDEX: 37.05 KG/M2

## 2024-08-22 DIAGNOSIS — M50.120 CERVICAL DISC DISORDER WITH RADICULOPATHY OF MID-CERVICAL REGION: ICD-10-CM

## 2024-08-22 DIAGNOSIS — M47.812 CERVICAL SPONDYLOSIS: ICD-10-CM

## 2024-08-22 DIAGNOSIS — M48.02 CERVICAL SPINAL STENOSIS: ICD-10-CM

## 2024-08-22 DIAGNOSIS — M54.12 CERVICAL RADICULOPATHY: Primary | ICD-10-CM

## 2024-08-22 PROCEDURE — 99213 OFFICE O/P EST LOW 20 MIN: CPT | Performed by: PHYSICIAN ASSISTANT

## 2024-08-22 RX ORDER — ROSUVASTATIN CALCIUM 40 MG/1
TABLET, COATED ORAL
COMMUNITY
Start: 2024-08-15

## 2024-08-22 NOTE — PROGRESS NOTES
Assessment:  1. Cervical radiculopathy    2. Cervical disc disorder with radiculopathy of mid-cervical region    3. Cervical spondylosis    4. Cervical spinal stenosis        Plan:  While the patient was in the office today, I did have a thorough conversation regarding their chronic pain syndrome, medication management, and treatment plan options.    The patient is status post cervical epidural steroid injection on 6/20/2024 and the patient reports complete resolution of pain.  He is very pleased with the report results of the procedure and has no new symptoms or acute issues on today's visit.  Patient was made aware that the injection can be repeated in the future, ideally waiting 3 months in between procedures.  He was also reminded that we would continue to require medical clearance regarding safely and temporarily holding the Xarelto prior to the procedure.    Continue with physical therapy and transition to home exercise program when ready.    He will follow-up with us on a as needed basis.    The patient was advised to contact the office should their symptoms worsen in the interim. The patient was agreeable and verbalized an understanding.        History of Present Illness:    The patient is a 69 y.o. male last seen on 6/20/2024 who presents for a follow up office visit in regards to chronic neck pain secondary to cervical spondylosis, cervical degenerative disc disease and cervical stenosis.  The patient currently reports 100% relief following a C7-T1 epidural steroid injection that was completed on 6/20/2024.  Patient denies any numbness, paresthesias or weakness of the upper extremities.  He is participating in physical therapy and believes that he has 1 more visit at which point he will transition to his own home exercise program.  Patient was previously evaluated by neurosurgical team who did not recommend any surgical intervention at this time.  He is very pleased with the results of the procedure and  has no new symptoms or acute issues on today's visit.    I have personally reviewed and/or updated the patient's past medical history, past surgical history, family history, social history, current medications, allergies, and vital signs today.       Review of Systems:    Review of Systems   Respiratory:  Negative for shortness of breath.    Cardiovascular:  Negative for chest pain.   Gastrointestinal:  Negative for constipation, diarrhea, nausea and vomiting.   Musculoskeletal:  Negative for arthralgias, gait problem, joint swelling and myalgias.   Skin:  Negative for rash.   Neurological:  Negative for dizziness, seizures and weakness.   All other systems reviewed and are negative.        Past Medical History:   Diagnosis Date   • Anxiety    • Atrial fibrillation (HCC)    • Colon polyp    • CPAP (continuous positive airway pressure) dependence     Has not been using   • GERD (gastroesophageal reflux disease)    • Hypercholesterolemia    • Hypertension    • Sleep apnea     not using CPAP       Past Surgical History:   Procedure Laterality Date   • ABDOMINAL SURGERY     • CAST APPLICATION Left 12/01/2022    Procedure: Application of thumb spica splint;  Surgeon: Papi Molina MD;  Location:  MAIN OR;  Service: Orthopedics   • COLONOSCOPY      July 2019:  Sessile serrated adenoma  June 2014, hyperplastic polyps and internal hemorrhoids   • ESOPHAGOGASTRODUODENOSCOPY      Irregular Z-line and gastritis biopsies negative for H pylori or Newton's   • HYDROCELE EXCISION / REPAIR     • JOINT REPLACEMENT Right     knee   • MA ARTHRP INTERPOS INTERCARPAL/METACARPAL JOINTS Right 07/14/2022    Procedure: right thumb interpositional arthroplasty;  Surgeon: Papi Molina MD;  Location:  MAIN OR;  Service: Orthopedics   • MA ARTHRP INTERPOS INTERCARPAL/METACARPAL JOINTS Left 12/01/2022    Procedure: Left thumb interpositional arthroplasty with pinning of the Metacarpophalangeal joint;  Surgeon: Papi Molina  "MD;  Location:  MAIN OR;  Service: Orthopedics   • UMBILICAL HERNIA REPAIR     • UPPER GASTROINTESTINAL ENDOSCOPY      October 2020: Irregular Z-line and gastritis, biopsies negative for EOE, Newton's, H pylori.   2008:  Negative for mass ulcer or Newton's   • VOLVULUS REDUCTION  2015       Family History   Problem Relation Age of Onset   • Breast cancer Mother    • Esophageal cancer Father    • Cancer Father         Esophageal   • Diabetes Family    • Breast cancer Family    • Esophageal cancer Family    • Colon cancer Family    • Colon cancer Brother    • Colon cancer Brother    • Diabetes Brother        Social History     Occupational History   • Not on file   Tobacco Use   • Smoking status: Never   • Smokeless tobacco: Never   Vaping Use   • Vaping status: Never Used   Substance and Sexual Activity   • Alcohol use: Not Currently   • Drug use: No   • Sexual activity: Not Currently     Partners: Female         Current Outpatient Medications:   •  amLODIPine (NORVASC) 10 mg tablet, Take 10 mg by mouth daily, Disp: , Rfl: 1  •  esomeprazole (NexIUM) 40 MG capsule, TAKE 1 CAPSULE (40 MG TOTAL) BY MOUTH DAILY., Disp: 100 capsule, Rfl: 1  •  losartan-hydrochlorothiazide (HYZAAR) 100-25 MG per tablet, Take 1 tablet by mouth daily, Disp: , Rfl: 0  •  metoprolol tartrate (LOPRESSOR) 50 mg tablet, Take 50 mg by mouth every 12 (twelve) hours, Disp: , Rfl:   •  PARoxetine (PAXIL) 20 mg tablet, Take 20 mg by mouth daily, Disp: , Rfl: 0  •  rivaroxaban (XARELTO) 20 mg tablet, Take 20 mg by mouth daily with breakfast, Disp: , Rfl:   •  rosuvastatin (CRESTOR) 40 MG tablet, , Disp: , Rfl:   •  pravastatin (PRAVACHOL) 40 mg tablet, Take 40 mg by mouth daily, Disp: , Rfl: 0    No Known Allergies    Physical Exam:    /82 (BP Location: Left arm, Patient Position: Sitting, Cuff Size: Large)   Pulse 64   Temp 98.3 °F (36.8 °C)   Ht 6' 3\" (1.905 m)   Wt 135 kg (298 lb)   BMI 37.25 kg/m²     Constitutional:normal, well " developed, well nourished, alert, in no distress and non-toxic and no overt pain behavior.  Eyes:anicteric  HEENT:grossly intact  Neck:supple, symmetric, trachea midline and no masses   Pulmonary:even and unlabored  Cardiovascular:No edema or pitting edema present  Skin:Normal without rashes or lesions and well hydrated  Psychiatric:Mood and affect appropriate  Neurologic:Cranial Nerves II-XII grossly intact  Musculoskeletal:normal      Imaging  No orders to display         No orders of the defined types were placed in this encounter.

## 2024-08-23 ENCOUNTER — EVALUATION (OUTPATIENT)
Dept: PHYSICAL THERAPY | Facility: CLINIC | Age: 69
End: 2024-08-23
Payer: COMMERCIAL

## 2024-08-23 DIAGNOSIS — M48.02 CERVICAL SPINAL STENOSIS: ICD-10-CM

## 2024-08-23 DIAGNOSIS — M48.02 FORAMINAL STENOSIS OF CERVICAL REGION: ICD-10-CM

## 2024-08-23 DIAGNOSIS — M54.12 CERVICAL RADICULOPATHY: Primary | ICD-10-CM

## 2024-08-23 DIAGNOSIS — M47.812 CERVICAL SPONDYLOSIS: ICD-10-CM

## 2024-08-23 PROCEDURE — 97164 PT RE-EVAL EST PLAN CARE: CPT | Performed by: PHYSICAL THERAPIST

## 2024-08-23 NOTE — PROGRESS NOTES
PT RE-Evaluation     Today's date: 2024  Patient name: Umer Aceves Jr.  : 1955  MRN: [id number]  Referring provider: Jorge Luis Davis,*  Dx:   Encounter Diagnosis     ICD-10-CM    1. Cervical radiculopathy  M54.12 Ambulatory referral to Physical Therapy      2. Foraminal stenosis of cervical region  M48.02 Ambulatory referral to Physical Therapy      3. Cervical spinal stenosis  M48.02 Ambulatory referral to Physical Therapy      4. Cervical spondylosis  M47.812 Ambulatory referral to Physical Therapy          2024 Re-Assessment: Umer Aceves Jr. is a 68 y.o. male seen in outpatient physical therapy at [facility] with complaints of cervical pain, stiffness and weakness.  He has been treated for decreased range of motion, decreased strength, limited flexibility, poor postural awareness, poor body mechanics, decreased tolerance to activity and decreased functional mobility due to Cervical radiculopathy  Foraminal stenosis of cervical region  Cervical spinal stenosis  Cervical spondylosis.  Re-evaluation was performed today to assess if he would benefit from skilled PT services in order to address these deficits and reach maximum level of function.  Thank you for the referral!    Understanding of Dx/Px/POC: excellent     Prognosis: excellent    Goals  STG  1. Independent with HEP in 2 weeks        Goal met  2. Decrease pain at worst by 50% in 2 weeks      Goal met    LTG  1. Increase cervicothoracic strength in all planes to 5/5 in 4 weeks    Goal met  2. Return to full, pain-free and unrestricted golfing in 4 weeks    Goal met      Plan  Patient hs met all goals set at IE and will be discharged from skilled PT at this time.     Subjective Evaluation    History of Present Illness  Mechanism of injury: Pt reports chronic history of cervical pain, stiffness and weakness with insidious onset. PT reports repeated R cervical rotation motions for many years at work. PMH includes nerve ablation,  injections. Patient denies pain with coughing/sneezing. Patient denies pain at night, sleeps with two pillows, supine. PMH CA.   Quality of life: good    Patient Goals  Patient goal: drive  without pain  Pain  Current pain ratin  At best pain ratin  At worst pain rating: 10       1/10  Location: L cervical  Quality: sharp and radiating  Relieving factors: heat  Exacerbated by: driving riding mower.  Progression: improved      Diagnostic Tests  X-ray: abnormal  MRI studies: abnormal  Treatments  Previous treatment: injection treatment, chiropractic and massage    Objective     Active Range of Motion   Cervical/Thoracic Spine       Cervical    Flexion: 45 degrees        65 degrees  Extension: 20 degrees       50 degrees painfree    with pain  Left lateral flexion: 30 degrees      35 degrees      Right lateral flexion: 20 degrees      35 degrees painfree    with pain  Left rotation: 30 degrees with pain     55 degrees painfree  Right rotation: 60 degrees       55 degrees        Strength/Myotome Testing   Cervical Spine   Neck extension: 5  Neck flexion: 5    Left Shoulder     Planes of Motion   Extension: 5   Abduction: 5   Adduction: 5   External rotation at 0°: 5   Internal rotation at 0°: 5     Isolated Muscles   Lower trapezius: 4     5  Middle trapezius: 4     5  Upper trapezius: 5     Right Shoulder     Planes of Motion   Extension: 5   Abduction: 5   Adduction: 5   External rotation at 0°: 5     Isolated Muscles   Lower trapezius: 4     5  Middle trapezius: 4     5  Upper trapezius: 5     Left Elbow   Flexion: 5  Extension: 5    Right Elbow   Flexion: 5  Extension: 5    Tests   Cervical   Positive neck flexor muscle endurance test.        Negative   Negative repeated extension, repeated flexion, alar ligament test and Sharp-Jocelyn test.     Left   Positive Spurling's Test A, Spurling's Test B and cervical flexion-rotation test .    Negative     Right   Negative Spurling's Test A, Spurling's  Test B and cervical flexion-rotation test.        Thoracic   Negative slump test.

## 2025-02-13 DIAGNOSIS — K21.9 GASTROESOPHAGEAL REFLUX DISEASE WITHOUT ESOPHAGITIS: ICD-10-CM

## 2025-02-13 RX ORDER — ESOMEPRAZOLE MAGNESIUM 40 MG/1
40 CAPSULE, DELAYED RELEASE ORAL DAILY
Qty: 90 CAPSULE | Refills: 1 | Status: SHIPPED | OUTPATIENT
Start: 2025-02-13

## 2025-05-07 ENCOUNTER — TELEPHONE (OUTPATIENT)
Age: 70
End: 2025-05-07

## 2025-05-07 NOTE — TELEPHONE ENCOUNTER
Caller: pt    Doctor: Dr. ge    Reason for call: pt would like to get another injection for his neck.    Call back#: [phone]

## 2025-05-16 ENCOUNTER — OFFICE VISIT (OUTPATIENT)
Dept: PAIN MEDICINE | Facility: CLINIC | Age: 70
End: 2025-05-16
Payer: COMMERCIAL

## 2025-05-16 VITALS
TEMPERATURE: 98.2 F | HEIGHT: 75 IN | BODY MASS INDEX: 36.18 KG/M2 | HEART RATE: 70 BPM | WEIGHT: 291 LBS | OXYGEN SATURATION: 92 %

## 2025-05-16 DIAGNOSIS — M47.812 CERVICAL SPONDYLOSIS: ICD-10-CM

## 2025-05-16 DIAGNOSIS — M54.12 CERVICAL RADICULOPATHY: Primary | ICD-10-CM

## 2025-05-16 PROCEDURE — 99214 OFFICE O/P EST MOD 30 MIN: CPT | Performed by: PHYSICIAN ASSISTANT

## 2025-05-16 RX ORDER — EZETIMIBE 10 MG/1
TABLET ORAL
COMMUNITY

## 2025-05-19 ENCOUNTER — TELEPHONE (OUTPATIENT)
Dept: RADIOLOGY | Facility: CLINIC | Age: 70
End: 2025-05-19

## 2025-05-19 NOTE — TELEPHONE ENCOUNTER
anti coag:   Faxed anti coag hold form to Dr. Trotter  Requested Medication to be held:Xarleto  Fax:   Phone: [phone]  Procedure to be scheduled: Brice  ??  *please keep this task in clinical pool until we get response back. (if forwarding this task to another office or physician, please keep clinical pool on the recipient list for tracking purposes)

## 2025-06-05 ENCOUNTER — HOSPITAL ENCOUNTER (OUTPATIENT)
Dept: RADIOLOGY | Facility: CLINIC | Age: 70
Discharge: HOME | End: 2025-06-05
Attending: PHYSICIAN ASSISTANT
Payer: COMMERCIAL

## 2025-06-05 VITALS
DIASTOLIC BLOOD PRESSURE: 82 MMHG | SYSTOLIC BLOOD PRESSURE: 143 MMHG | TEMPERATURE: 97.7 F | OXYGEN SATURATION: 93 % | RESPIRATION RATE: 18 BRPM | HEART RATE: 67 BPM

## 2025-06-05 DIAGNOSIS — M54.12 CERVICAL RADICULOPATHY: ICD-10-CM

## 2025-06-05 PROCEDURE — 62321 NJX INTERLAMINAR CRV/THRC: CPT | Performed by: ANESTHESIOLOGY

## 2025-06-05 RX ORDER — METHYLPREDNISOLONE ACETATE 80 MG/ML
80 INJECTION, SUSPENSION INTRA-ARTICULAR; INTRALESIONAL; INTRAMUSCULAR; PARENTERAL; SOFT TISSUE ONCE
Status: COMPLETED | OUTPATIENT
Start: 2025-06-05 | End: 2025-06-05

## 2025-06-05 RX ADMIN — IOHEXOL 1 ML: 300 INJECTION, SOLUTION INTRAVENOUS at 10:15

## 2025-06-05 RX ADMIN — METHYLPREDNISOLONE ACETATE 80 MG: 80 INJECTION, SUSPENSION INTRA-ARTICULAR; INTRALESIONAL; INTRAMUSCULAR; SOFT TISSUE at 10:15

## 2025-06-05 NOTE — H&P
History of Present Illness: The patient is a 69 y.o. male who presents with complaints of neck and arm pain.    Past Medical History[1]    Past Surgical History[2]    Current Medications[3]    Allergies[4]    Physical Exam:   Vitals:    06/05/25 1004   BP: 142/85   Pulse: 65   Resp: 20   Temp: 97.7 °F (36.5 °C)   SpO2: 93%     General: Awake, Alert, Oriented x 3, Mood and affect appropriate  Respiratory: Respirations even and unlabored  Cardiovascular: Peripheral pulses intact; no edema  Musculoskeletal Exam: Decreased range of motion cervical spine    ASA Score: 3    Patient/Chart Verification  Patient ID Verified: Verbal  ID Band Applied: No  Consents Confirmed: To be obtained in the Procedural area  H&P( within 30 days) Verified: To be obtained in the Procedural area  Interval H&P(within 24 hr) Complete (required for Outpatients and Surgery Admit only): To be obtained in the Procedural area  Allergies Reviewed: Yes  Anticoag/NSAID held?: Yes (JIM xarelto 6/1, SL aware)  Currently on antibiotics?: No    Assessment:   1. Cervical radiculopathy        Plan: C7-T1 CARRIE         [1]   Past Medical History:  Diagnosis Date    Anxiety     Atrial fibrillation (HCC)     Colon polyp     CPAP (continuous positive airway pressure) dependence     Has not been using    GERD (gastroesophageal reflux disease)     Hypercholesterolemia     Hypertension     Sleep apnea     not using CPAP   [2]   Past Surgical History:  Procedure Laterality Date    ABDOMINAL SURGERY      CAST APPLICATION Left 12/01/2022    Procedure: Application of thumb spica splint;  Surgeon: Papi Molina MD;  Location:  MAIN OR;  Service: Orthopedics    COLONOSCOPY      July 2019:  Sessile serrated adenoma  June 2014, hyperplastic polyps and internal hemorrhoids    ESOPHAGOGASTRODUODENOSCOPY      Irregular Z-line and gastritis biopsies negative for H pylori or Newton's    HYDROCELE EXCISION / REPAIR      JOINT REPLACEMENT Right     knee    DC ARTHRP  INTERCARPAL/CARP/MTCRPL JT INTERPOSITION Right 07/14/2022    Procedure: right thumb interpositional arthroplasty;  Surgeon: Papi Molina MD;  Location: [facility] OR;  Service: Orthopedics    NH ARTHRP INTERCARPAL/CARP/MTCRPL JT INTERPOSITION Left 12/01/2022    Procedure: Left thumb interpositional arthroplasty with pinning of the Metacarpophalangeal joint;  Surgeon: Papi Molina MD;  Location: [facility];  Service: Orthopedics    UMBILICAL HERNIA REPAIR      UPPER GASTROINTESTINAL ENDOSCOPY      October 2020: Irregular Z-line and gastritis, biopsies negative for EOE, Newton's, H pylori.   2008:  Negative for mass ulcer or Newton's    VOLVULUS REDUCTION  2015   [3]   Current Outpatient Medications:     amLODIPine (NORVASC) 10 mg tablet, Take 10 mg by mouth in the morning., Disp: , Rfl: 1    esomeprazole (NexIUM) 40 MG capsule, TAKE 1 CAPSULE (40 MG TOTAL) BY MOUTH DAILY., Disp: 90 capsule, Rfl: 1    ezetimibe (ZETIA) 10 mg tablet, , Disp: , Rfl:     losartan-hydrochlorothiazide (HYZAAR) 100-25 MG per tablet, Take 1 tablet by mouth in the morning., Disp: , Rfl: 0    metoprolol tartrate (LOPRESSOR) 50 mg tablet, Take 50 mg by mouth every 12 (twelve) hours, Disp: , Rfl:     PARoxetine (PAXIL) 20 mg tablet, Take 20 mg by mouth in the morning., Disp: , Rfl: 0    rivaroxaban (XARELTO) 20 mg tablet, Take 20 mg by mouth daily with breakfast, Disp: , Rfl:     rosuvastatin (CRESTOR) 40 MG tablet, , Disp: , Rfl:     Current Facility-Administered Medications:     iohexol (OMNIPAQUE) 300 mg/mL injection 1 mL, 1 mL, Epidural, Once, Bjorn Jerez DO    methylPREDNISolone acetate (DEPO-MEDROL) injection 80 mg, 80 mg, Epidural, Once, Bjorn Jerez DO  [4] No Known Allergies

## 2025-06-05 NOTE — DISCHARGE INSTR - LAB
Epidural Steroid Injection   WHAT YOU NEED TO KNOW:   An epidural steroid injection (CINDY) is a procedure to inject steroid medicine into the epidural space. The epidural space is between your spinal cord and vertebrae. Steroids reduce inflammation and fluid buildup in your spine that may be causing pain. You may be given pain medicine along with the steroids.          ACTIVITY  Do not drive or operate machinery today.  No strenuous activity today - bending, lifting, etc.  You may resume normal activites starting tomorrow - start slowly and as tolerated.  You may shower today, but no tub baths or hot tubs.  You may have numbness for several hours from the local anesthetic. Please use caution and common sense, especially with weight-bearing activities.    CARE OF THE INJECTION SITE  If you have soreness or pain, apply ice to the area today (20 minutes on/20 minutes off).  Starting tomorrow, you may use warm, moist heat or ice if needed.  You may have an increase or change in your discomfort for 36-48 hours after your treatment.  Apply ice and continue with any pain medication you have been prescribed.  Notify the [facility] if you have any of the following: redness, drainage, swelling, headache, stiff neck or fever above 100°F.    SPECIAL INSTRUCTIONS  Our office will contact you in approximately 14 days for a progress report.    MEDICATIONS  Continue to take all routine medications. Resume Xarelto 6/6/25  Our office may have instructed you to hold some medications.    As no general anesthesia was used in today's procedure, you should not experience any side effects related to anesthesia.     If you are diabetic, the steroids used in today's injection may temporarily increase your blood sugar levels after the first few days after your injection. Please keep a close eye on your sugars and alert the doctor who manages your diabetes if your sugars are significantly high from your baseline or you are  symptomatic.     If you have a problem specifically related to your procedure, please call our office at [phone].  Problems not related to your procedure should be directed to your primary care physician.

## 2025-06-19 ENCOUNTER — TELEPHONE (OUTPATIENT)
Dept: PAIN MEDICINE | Facility: CLINIC | Age: 70
End: 2025-06-19

## 2025-08-08 DIAGNOSIS — K21.9 GASTROESOPHAGEAL REFLUX DISEASE WITHOUT ESOPHAGITIS: ICD-10-CM

## 2025-08-08 RX ORDER — ESOMEPRAZOLE MAGNESIUM 40 MG/1
40 CAPSULE, DELAYED RELEASE ORAL DAILY
Qty: 90 CAPSULE | Refills: 1 | Status: SHIPPED | OUTPATIENT
Start: 2025-08-08

## (undated) DEVICE — IMPLANTABLE DEVICE
Type: IMPLANTABLE DEVICE | Site: FINGER | Status: NON-FUNCTIONAL
Removed: 2022-07-14

## (undated) DEVICE — 3M™ STERI-STRIP™ REINFORCED ADHESIVE SKIN CLOSURES, R1547, 1/2 IN X 4 IN (12 MM X 100 MM), 6 STRIPS/ENVELOPE: Brand: 3M™ STERI-STRIP™

## (undated) DEVICE — SPONGE PVP SCRUB WING STERILE

## (undated) DEVICE — GLOVE INDICATOR PI UNDERGLOVE SZ 8 BLUE

## (undated) DEVICE — SUT VICRYL 3-0 SH 27 IN J416H

## (undated) DEVICE — SUT PROLENE 4-0 PC-3 18 IN 8634G

## (undated) DEVICE — PADDING CAST 4 IN  COTTON STRL

## (undated) DEVICE — ACE WRAP 3 IN UNSTERILE

## (undated) DEVICE — CURITY STRETCH BANDAGE: Brand: CURITY

## (undated) DEVICE — GAUZE SPONGES,16 PLY: Brand: CURITY

## (undated) DEVICE — CUFF TOURNIQUET 18 X 4 IN QUICK CONNECT DISP 1 BLADDER

## (undated) DEVICE — OCCLUSIVE GAUZE STRIP,3% BISMUTH TRIBROMOPHENATE IN PETROLATUM BLEND: Brand: XEROFORM

## (undated) DEVICE — ABDOMINAL PAD: Brand: DERMACEA

## (undated) DEVICE — GLOVE SRG BIOGEL 7.5

## (undated) DEVICE — SPLINT 5 X 30 IN XFAST SET PLASTER

## (undated) DEVICE — GLOVE INDICATOR PI UNDERGLOVE SZ 7.5 BLUE

## (undated) DEVICE — CAP PROTECTIVE 0.062IN TPR FIT

## (undated) DEVICE — MINI BLADE ROUND TIP SHARP ON ONE SIDE

## (undated) DEVICE — STERILE BETHLEHEM PLASTIC HAND: Brand: CARDINAL HEALTH

## (undated) DEVICE — SUT FIBERWIRE #2 1/2 CIRCLE T-5 38IN AR-7200

## (undated) DEVICE — STANDARD SURGICAL GOWN, L: Brand: CONVERTORS

## (undated) DEVICE — ACE WRAP 3 IN STERILE

## (undated) DEVICE — ARM SLING: Brand: DEROYAL

## (undated) DEVICE — SURGIFOAM 7 X 12 SPONGE ABS